# Patient Record
Sex: FEMALE | Race: WHITE | NOT HISPANIC OR LATINO | Employment: OTHER | URBAN - NONMETROPOLITAN AREA
[De-identification: names, ages, dates, MRNs, and addresses within clinical notes are randomized per-mention and may not be internally consistent; named-entity substitution may affect disease eponyms.]

---

## 2018-03-23 ENCOUNTER — OFFICE VISIT CONVERTED (OUTPATIENT)
Dept: FAMILY MEDICINE CLINIC | Age: 78
End: 2018-03-23
Attending: FAMILY MEDICINE

## 2018-08-29 ENCOUNTER — OFFICE VISIT CONVERTED (OUTPATIENT)
Dept: FAMILY MEDICINE CLINIC | Age: 78
End: 2018-08-29
Attending: FAMILY MEDICINE

## 2018-09-04 ENCOUNTER — OFFICE VISIT CONVERTED (OUTPATIENT)
Dept: FAMILY MEDICINE CLINIC | Age: 78
End: 2018-09-04
Attending: FAMILY MEDICINE

## 2018-10-17 ENCOUNTER — OFFICE VISIT CONVERTED (OUTPATIENT)
Dept: NEUROSURGERY | Facility: CLINIC | Age: 78
End: 2018-10-17
Attending: PHYSICIAN ASSISTANT

## 2018-10-17 ENCOUNTER — CONVERSION ENCOUNTER (OUTPATIENT)
Dept: NEUROLOGY | Facility: CLINIC | Age: 78
End: 2018-10-17

## 2018-12-05 ENCOUNTER — OFFICE VISIT CONVERTED (OUTPATIENT)
Dept: FAMILY MEDICINE CLINIC | Age: 78
End: 2018-12-05
Attending: NURSE PRACTITIONER

## 2019-01-25 ENCOUNTER — OFFICE VISIT CONVERTED (OUTPATIENT)
Dept: FAMILY MEDICINE CLINIC | Age: 79
End: 2019-01-25
Attending: FAMILY MEDICINE

## 2019-03-22 ENCOUNTER — HOSPITAL ENCOUNTER (OUTPATIENT)
Dept: OTHER | Facility: HOSPITAL | Age: 79
Discharge: HOME | End: 2019-03-22
Attending: FAMILY MEDICINE

## 2019-03-22 ENCOUNTER — OFFICE VISIT CONVERTED (OUTPATIENT)
Dept: FAMILY MEDICINE CLINIC | Age: 79
End: 2019-03-22
Attending: FAMILY MEDICINE

## 2019-03-22 LAB
APPEARANCE UR: CLEAR
BACTERIA UR QL AUTO: ABNORMAL
BILIRUB UR QL: NEGATIVE
CASTS URNS QL MICRO: ABNORMAL /[LPF]
COLOR UR: ABNORMAL
CONV LEUKOCYTE ESTERASE: NEGATIVE
CONV UROBILINOGEN IN URINE BY AUTOMATED TEST STRIP: 0.2 {EHRLICHU}/DL (ref 0.1–1)
EPI CELLS #/AREA URNS HPF: ABNORMAL /[HPF]
GLUCOSE 24H UR-MCNC: NEGATIVE MG/DL
HGB UR QL STRIP: NEGATIVE
KETONES UR QL STRIP: NEGATIVE MG/DL
MUCOUS THREADS URNS QL MICRO: ABNORMAL
NITRITE UR-MCNC: NEGATIVE MG/ML
PH UR STRIP.AUTO: 6.5 [PH] (ref 5–8)
PROT UR-MCNC: NEGATIVE MG/DL
RBC # BLD AUTO: ABNORMAL /[HPF]
SP GR UR STRIP: 1.02 (ref 1–1.03)
SPECIMEN SOURCE: ABNORMAL
UNIDENT CRYS URNS QL MICRO: ABNORMAL /[HPF]
WBC #/AREA URNS HPF: ABNORMAL /[HPF]

## 2019-09-24 ENCOUNTER — HOSPITAL ENCOUNTER (OUTPATIENT)
Dept: OTHER | Facility: HOSPITAL | Age: 79
Discharge: HOME | End: 2019-09-24
Attending: FAMILY MEDICINE

## 2019-09-24 ENCOUNTER — OFFICE VISIT CONVERTED (OUTPATIENT)
Dept: FAMILY MEDICINE CLINIC | Age: 79
End: 2019-09-24
Attending: FAMILY MEDICINE

## 2019-09-26 ENCOUNTER — HOSPITAL ENCOUNTER (OUTPATIENT)
Dept: PHYSICAL THERAPY | Facility: CLINIC | Age: 79
Setting detail: RECURRING SERIES
Discharge: HOME | End: 2019-11-08
Attending: FAMILY MEDICINE

## 2019-10-29 ENCOUNTER — HOSPITAL ENCOUNTER (OUTPATIENT)
Dept: OTHER | Facility: HOSPITAL | Age: 79
Discharge: HOME | End: 2019-10-29
Attending: FAMILY MEDICINE

## 2019-10-29 ENCOUNTER — OFFICE VISIT CONVERTED (OUTPATIENT)
Dept: FAMILY MEDICINE CLINIC | Age: 79
End: 2019-10-29
Attending: FAMILY MEDICINE

## 2019-10-29 LAB
ALBUMIN SERPL-MCNC: 3.9 G/DL (ref 3.5–5)
ALBUMIN/GLOB SERPL: 1.4 {RATIO} (ref 1.4–2.6)
ALP SERPL-CCNC: 84 U/L (ref 43–160)
ALT SERPL-CCNC: 14 U/L (ref 10–40)
ANION GAP SERPL CALC-SCNC: 18 MMOL/L (ref 8–19)
AST SERPL-CCNC: 18 U/L (ref 15–50)
BILIRUB SERPL-MCNC: 0.28 MG/DL (ref 0.2–1.3)
BUN SERPL-MCNC: 19 MG/DL (ref 5–25)
BUN/CREAT SERPL: 23 {RATIO} (ref 6–20)
CALCIUM SERPL-MCNC: 9.5 MG/DL (ref 8.7–10.4)
CHLORIDE SERPL-SCNC: 101 MMOL/L (ref 99–111)
CHOLEST SERPL-MCNC: 184 MG/DL (ref 107–200)
CHOLEST/HDLC SERPL: 3.5 {RATIO} (ref 3–6)
CONV CO2: 23 MMOL/L (ref 22–32)
CONV TOTAL PROTEIN: 6.7 G/DL (ref 6.3–8.2)
CREAT UR-MCNC: 0.83 MG/DL (ref 0.5–0.9)
ERYTHROCYTE [DISTWIDTH] IN BLOOD BY AUTOMATED COUNT: 13.1 % (ref 11.5–14.5)
GFR SERPLBLD BASED ON 1.73 SQ M-ARVRAT: >60 ML/MIN/{1.73_M2}
GLOBULIN UR ELPH-MCNC: 2.8 G/DL (ref 2–3.5)
GLUCOSE SERPL-MCNC: 89 MG/DL (ref 65–99)
HBA1C MFR BLD: 12.9 G/DL (ref 12–16)
HCT VFR BLD AUTO: 39.7 % (ref 37–47)
HDLC SERPL-MCNC: 53 MG/DL (ref 40–60)
LDLC SERPL CALC-MCNC: 111 MG/DL (ref 70–100)
MCH RBC QN AUTO: 28.8 PG (ref 27–31)
MCHC RBC AUTO-ENTMCNC: 32.5 G/DL (ref 33–37)
MCV RBC AUTO: 88.6 FL (ref 81–99)
OSMOLALITY SERPL CALC.SUM OF ELEC: 286 MOSM/KG (ref 273–304)
PLATELET # BLD AUTO: 332 10*3/UL (ref 130–400)
PMV BLD AUTO: 9.9 FL (ref 7.4–10.4)
POTASSIUM SERPL-SCNC: 4.7 MMOL/L (ref 3.5–5.3)
RBC # BLD AUTO: 4.48 10*6/UL (ref 4.2–5.4)
SODIUM SERPL-SCNC: 137 MMOL/L (ref 135–147)
TRIGL SERPL-MCNC: 102 MG/DL (ref 40–150)
VIT B12 SERPL-MCNC: 1396 PG/ML (ref 211–911)
VLDLC SERPL-MCNC: 20 MG/DL (ref 5–37)
WBC # BLD AUTO: 8.06 10*3/UL (ref 4.8–10.8)

## 2019-12-11 ENCOUNTER — OFFICE VISIT CONVERTED (OUTPATIENT)
Dept: FAMILY MEDICINE CLINIC | Age: 79
End: 2019-12-11
Attending: NURSE PRACTITIONER

## 2020-05-20 ENCOUNTER — OFFICE VISIT CONVERTED (OUTPATIENT)
Dept: FAMILY MEDICINE CLINIC | Age: 80
End: 2020-05-20
Attending: FAMILY MEDICINE

## 2020-06-18 ENCOUNTER — OFFICE VISIT CONVERTED (OUTPATIENT)
Dept: FAMILY MEDICINE CLINIC | Age: 80
End: 2020-06-18
Attending: FAMILY MEDICINE

## 2020-09-21 ENCOUNTER — OFFICE VISIT CONVERTED (OUTPATIENT)
Dept: FAMILY MEDICINE CLINIC | Age: 80
End: 2020-09-21
Attending: FAMILY MEDICINE

## 2020-10-01 ENCOUNTER — HOSPITAL ENCOUNTER (OUTPATIENT)
Dept: OTHER | Facility: HOSPITAL | Age: 80
Discharge: HOME | End: 2020-10-01
Attending: FAMILY MEDICINE

## 2020-10-01 LAB
ERYTHROCYTE [DISTWIDTH] IN BLOOD BY AUTOMATED COUNT: 13.4 % (ref 11.7–14.4)
HCT VFR BLD AUTO: 41.3 % (ref 37–47)
HGB BLD-MCNC: 13.2 G/DL (ref 12–16)
MCH RBC QN AUTO: 29.3 PG (ref 27–31)
MCHC RBC AUTO-ENTMCNC: 32 G/DL (ref 33–37)
MCV RBC AUTO: 91.8 FL (ref 81–99)
PLATELET # BLD AUTO: 380 10*3/UL (ref 130–400)
PMV BLD AUTO: 9.9 FL (ref 9.4–12.3)
RBC # BLD AUTO: 4.5 10*6/UL (ref 4.2–5.4)
WBC # BLD AUTO: 10.04 10*3/UL (ref 4.8–10.8)

## 2020-10-02 LAB
ALBUMIN SERPL-MCNC: 4.2 G/DL (ref 3.5–5)
ALBUMIN/GLOB SERPL: 1.4 {RATIO} (ref 1.4–2.6)
ALP SERPL-CCNC: 74 U/L (ref 43–160)
ALT SERPL-CCNC: 16 U/L (ref 10–40)
ANION GAP SERPL CALC-SCNC: 16 MMOL/L (ref 8–19)
AST SERPL-CCNC: 20 U/L (ref 15–50)
BILIRUB SERPL-MCNC: 0.24 MG/DL (ref 0.2–1.3)
BUN SERPL-MCNC: 12 MG/DL (ref 5–25)
BUN/CREAT SERPL: 12 {RATIO} (ref 6–20)
CALCIUM SERPL-MCNC: 10 MG/DL (ref 8.7–10.4)
CHLORIDE SERPL-SCNC: 100 MMOL/L (ref 99–111)
CONV CO2: 29 MMOL/L (ref 22–32)
CONV TOTAL PROTEIN: 7.2 G/DL (ref 6.3–8.2)
CREAT UR-MCNC: 1.04 MG/DL (ref 0.5–0.9)
GFR SERPLBLD BASED ON 1.73 SQ M-ARVRAT: 50 ML/MIN/{1.73_M2}
GLOBULIN UR ELPH-MCNC: 3 G/DL (ref 2–3.5)
GLUCOSE SERPL-MCNC: 84 MG/DL (ref 65–99)
OSMOLALITY SERPL CALC.SUM OF ELEC: 289 MOSM/KG (ref 273–304)
POTASSIUM SERPL-SCNC: 4.6 MMOL/L (ref 3.5–5.3)
SODIUM SERPL-SCNC: 140 MMOL/L (ref 135–147)
T4 FREE SERPL-MCNC: 1.3 NG/DL (ref 0.9–1.8)
TSH SERPL-ACNC: 2.95 M[IU]/L (ref 0.27–4.2)

## 2021-04-27 ENCOUNTER — OFFICE VISIT CONVERTED (OUTPATIENT)
Dept: FAMILY MEDICINE CLINIC | Age: 81
End: 2021-04-27
Attending: FAMILY MEDICINE

## 2021-05-16 VITALS
DIASTOLIC BLOOD PRESSURE: 78 MMHG | WEIGHT: 199 LBS | SYSTOLIC BLOOD PRESSURE: 140 MMHG | HEIGHT: 62 IN | BODY MASS INDEX: 36.62 KG/M2 | HEART RATE: 88 BPM

## 2021-05-18 NOTE — PROGRESS NOTES
Yolanda Sharp 1940     Office/Outpatient Visit    Visit Date: Wed, Dec 5, 2018 04:12 pm    Provider: Pat Bocanegra N.P. (Assistant: Dirk Piña)    Location: [facility]        Electronically signed by Pat Bocanegra N.P. on  2018 11:19:56 AM                             SUBJECTIVE:        CC:     Ms. Sharp is a 77 year old White female.  poked herself in her left eye 2 days ago, red looking, yellow discharge; (wants flu shot today)         HPI:         2 days ago was putting up shyann tree at home.  artificial tree branch poked her in left eye.  eye has been itchy and watery with occasional yellow discharge.          corner of outer nostril x 2 wks without discharge.      ROS:     CONSTITUTIONAL:  Negative for chills, fatigue, fever, and weight change.      EYES:  Positive for eye drainage ( clear to yellow left eye ).   Negative for blurred vision, eye pain or photophobia.      E/N/T:  Negative for hearing problems, E/N/T pain, congestion, rhinorrhea, epistaxis, hoarseness, and dental problems.      CARDIOVASCULAR:  Negative for chest pain, palpitations, tachycardia, orthopnea, and edema.      RESPIRATORY:  Negative for cough, dyspnea, and hemoptysis.      MUSCULOSKELETAL:  Negative for arthralgias, back pain, and myalgias.      NEUROLOGICAL:  Negative for dizziness, headaches, paresthesias, and weakness.          PMH/FMH/SH:     Last Reviewed on 2018 02:16 PM by Kathleen Brown    Past Medical History:         Hypertension     Gastroesophageal Reflux Disease     Osteoporosis     obesity    chronic back pain,     sleep apnea    hyperhidrosis    Chronic LBP    constipation    obesity    Depression with anxiety         GYNECOLOGICAL HISTORY:     miscarriage 1         CURRENT MEDICAL PROVIDERS:    Ophthalmologist: MORTEZA    Orthopedist: GREEN    pain management Dr Montes         PREVENTIVE HEALTH MAINTENANCE             BONE DENSITY: was last done  with the  following abnormality noted-- osteopenia     COLORECTAL CANCER SCREENING: Up to date (colonoscopy q10y; sigmoidoscopy q5y; Cologuard q3y) was last done , Results are in chart     EYE EXAM: was last done  s/p cataract implants     MAMMOGRAM: Done within last 2 years and results in are chart was last done  with normal results         PAST MEDICAL HISTORY                 ADVANCED DIRECTIVES: None         Surgical History:         Appendectomy    Cholecystectomy    Hysterectomy      ortho procedure left knee; Procedures: left hand surgery 80's cataract surgery both eyes          Family History:     Father: ;  Cerebrovascular Accident     Mother: ;  Sudden Cardiac Death         Social History:     Occupation: Homemaker. OTHER (enter), retired;     Marital Status:      Children: 3 children         Tobacco/Alcohol/Supplements:     Last Reviewed on 2018 02:16 PM by Kathleen Brown    Tobacco: She has a past history of cigarette smoking; quit date:  .          Alcohol:  Does not drink alcohol and never has.          Supplements:  Patient admits to use of multivitamin.          Mental Health History:         Generalized Anxiety Disorder      Major Depression             Current Problems:     Last Reviewed on 2014 03:48 PM by Kathleen Brown    Degenerative disc disease, NOS     Dizziness     Unspecified hearing loss     Use of high risk medications     Chronic low back pain     Fatigue     Eczema     Generalized weakness     Constipation     Lipoma, other specified site     Generalized anxiety disorder     Hip pain     Leukocytosis, unspecified     Generalized osteoarthritis, multiple sites     Obstructive sleep apnea     Excessive sweating     Chronic insomnia     GERD     Bariatric surgery status     Overweight     Dietary surveillance and counseling     Morbid obesity     Osteopenia     Weight loss program, follow-up     Severe obesity     Facial droop      Patient visit for long term (current) use of other drugs     Hypertension     Sleep apnea     Low back pain     Diffuse arthralgia     Hyperhidrosis     Back pain     Breast mass     Night sweats     Joint pain     Depression with anxiety     Screening for depression         Immunizations:     Prevnar 13 (Pneumococcal PCV 13) 8/24/2015     Flulaval 11/28/2007     Fluzone (3 + years dose) 10/10/2008     Fluzone (3 + years dose) 2/22/2012     Fluzone (3 + years dose) 11/14/2012     Fluzone High-Dose pf (>=65 yr) 12/9/2013     Fluzone High-Dose pf (>=65 yr) 10/13/2014     Fluzone High-Dose pf (>=65 yr) 2/7/2017         Allergies:     Last Reviewed on 9/04/2018 01:31 PM by Zofia Queen:        Current Medications:     Last Reviewed on 12/05/2018 04:17 PM by Dirk Piña    Tramadol 50mg Tablet 1-2 po QID     Fluoxetine 20mg Capsules 1 capsule daily     Omeprazole 40mg Capsules, Extended Release 1 capsule daily     Spironolactone 25mg Tablet 1 tab daily     Zofran 4mg Tablet 1 po q 6 hours prn N/V     Magnesium Citrate     Melatonin     Biotin     Calcium     CPAP at hs     OTC allergy pill     Vitamin B12     multivitamin daily         OBJECTIVE:        Vitals:         Current: 12/5/2018 4:18:58 PM    Ht:  5 ft, 2.75 in;  Wt: 201 lbs;  BMI: 35.9    T: 98.1 F (oral);  BP: 142/74 mm Hg (left arm, sitting);  P: 72 bpm (left arm (BP Cuff), sitting);  sCr: 0.82 mg/dL;  GFR: 62.28        Exams:     PHYSICAL EXAM:     GENERAL:  well developed and nourished; appropriately groomed; in no apparent distress;     EYES: lids and lacrimal system are normal in appearance; extraocular movements intact; left medial conjunctival abrasion seen by fluorescein stain;  PERRLA;     E/N/T: EARS:;     RESPIRATORY: normal respiratory rate and pattern with no distress; normal breath sounds with no rales, rhonchi, wheezes or rubs;     CARDIOVASCULAR: normal rate; rhythm is regular;     BREAST/INTEGUMENT: 1-2 mm flate,  red area near outer, right nares  without discharge.;     NEUROLOGIC: mental status: alert and oriented x 3; GROSSLY INTACT     PSYCHIATRIC:  appropriate affect and demeanor; normal speech pattern; grossly normal memory;         Procedures:     Vaccination against other viral diseases, Influenza     1. Influenza high dose 0.5 ml unit dose, ABN signed given IM in the left upper arm; administered by AND;  lot number [id number]; expires 6/4/19 Regarding contraindications to an Influenza vaccine:        No contraindications were noted.              ASSESSMENT           V04.81   Z23  Vaccination against other viral diseases, Influenza              DDx:     918.1   S05.02XA  Corneal abrasion              DDx:     239.2   D49.9  Unspecified skin lesion              DDx:         ORDERS:         Meds Prescribed:       Mupirocin 2% Topical Ointment apply affected area bid  #15 (Fifteen) gm Refills: 0       Tobramycin 0.3% Ophthalmic Solution Instill 2 drop(s) to affected eye(s) q 12 h  #5 (Five) ml Refills: 0         Procedures Ordered:       79319  Fluzone High Dose  (In-House)           Other Orders:         Medicare Flu Administration  (In-House)                   PLAN:          Vaccination against other viral diseases, Influenza           Orders:       65022  Fluzone High Dose  (In-House)           Medicare Flu Administration  (In-House)            Corneal abrasion         RECOMMENDATIONS given include: see eye dr in 2 days if not improving.  no foreign body noted.  does see dr saldivar for regular eye exams..            Prescriptions:       Tobramycin 0.3% Ophthalmic Solution Instill 2 drop(s) to affected eye(s) q 12 h  #5 (Five) ml Refills: 0          Unspecified skin lesion           Prescriptions:       Mupirocin 2% Topical Ointment apply affected area bid  #15 (Fifteen) gm Refills: 0             CHARGE CAPTURE           **Please note: ICD descriptions below are intended for billing purposes only and may not  represent clinical diagnoses**        Primary Diagnosis:         V04.81 Vaccination against other viral diseases, Influenza            Z23    Encounter for immunization              Orders:          80806   Office/outpatient visit; established patient, level 3  (In-House)             89682   Fluzone High Dose  (In-House)                Medicare Flu Administration  (In-House)           918.1 Corneal abrasion            S05.02XA    Injury of conjunctiva and corneal abrasion without foreign body, left eye, initial encounter    239.2 Unspecified skin lesion            D49.9    Neoplasm of unspecified behavior of unspecified site

## 2021-05-18 NOTE — PROGRESS NOTES
"Yolanda Sharp  1940     Office/Outpatient Visit    Visit Date: Wed, May 20, 2020 12:57 pm    Provider: Kathleen Brown MD (Assistant: Judith Jackman LPN)    Location: [facility]        Electronically signed by Kathleen Brown MD on  05/20/2020 03:47:20 PM                             Subjective:        CC: Ms. Sharp is a 79 year old White female.  pt has a [employer] [phone], medication refills; routine f/u to discuss meds        HPI:           Ms. Sharp presents with essential (primary) hypertension.  Her current cardiac medication regimen includes a diuretic, an angiotensin receptor blocker, and spironolactone.  Ms. Sharp does not check her blood pressure other than at her clinic appointments.  She is tolerating the medication well without side effects.  Compliance with treatment has been good; she takes her medication as directed, maintains her diet and exercise regimen, and follows up as directed.        Yolanda complains of a one month history of having fast intermittent heart rate and dizzy spells.  She takes meclizine when this occurs.  NO chest pain, no fever or SOA, she is worried about a stroke, has FH stroke.      her BM are big and hard and she has to \"dig it out of her\" She is on miralax daily.  She is not on stool softeners    ROS:     CONSTITUTIONAL:  Negative for chills and fever.      CARDIOVASCULAR:  Positive for dizziness.   Negative for chest pain, palpitations or edema.      RESPIRATORY:  Negative for dyspnea and cough.      GASTROINTESTINAL:  Positive for constipation.   Negative for abdominal pain or diarrhea.      MUSCULOSKELETAL:  Positive for bilateral knee pain, left hip pain, back pain and neck pain..      INTEGUMENTARY/BREAST:  Negative for rash.      NEUROLOGICAL:  Positive for paresthesias and weakness.      PSYCHIATRIC:  Negative for anxiety, depression, and sleep disturbances.          Past Medical History / Family History / Social History:         Last " Reviewed on 2020 02:43 PM by Kathleen Brown    Past Medical History:         Hypertension     Gastroesophageal Reflux Disease     Osteoporosis     obesity    chronic back pain,     sleep apnea    hyperhidrosis    Chronic LBP    constipation    obesity    Depression with anxiety         GYNECOLOGICAL HISTORY:     miscarriage 1         CURRENT MEDICAL PROVIDERS:    Ophthalmologist: MORTEZA    Orthopedist: GREEN    pain management Dr Montes         PREVENTIVE HEALTH MAINTENANCE             BONE DENSITY: was last done  with the following abnormality noted-- osteopenia     COLORECTAL CANCER SCREENING: Up to date (colonoscopy q10y; sigmoidoscopy q5y; Cologuard q3y) was last done , Results are in chart     EYE EXAM: was last done  s/p cataract implants     MAMMOGRAM: Done within last 2 years and results in are chart was last done  with normal results         PAST MEDICAL HISTORY                 ADVANCED DIRECTIVES: None         Surgical History:         Appendectomy    Cholecystectomy    Hysterectomy     ortho procedure left knee; Procedures: left hand surgery 's cataract surgery both eyes          Family History:     Father: ;  Cerebrovascular Accident     Mother: ;  Sudden Cardiac Death         Social History:     Occupation: Homemaker. OTHER (enter), retired;     Marital Status:      Children: 3 children         Tobacco/Alcohol/Supplements:     Last Reviewed on 2020 12:59 PM by Judith Jackman    Tobacco: She has a past history of cigarette smoking; quit date:  .          Alcohol:  Does not drink alcohol and never has.          Supplements:  Patient admits to use of multivitamin.          Substance Abuse History:     Last Reviewed on 2019 06:26 PM by Roney Holguin        Mental Health History:     Last Reviewed on 2019 06:26 PM by Roney Holguin        Generalized Anxiety Disorder     Major Depression         Communicable Diseases  (eg STDs):     Last Reviewed on 9/24/2019 06:26 PM by Roney Holguin        Allergies:     Last Reviewed on 12/11/2019 02:57 PM by Mi Dela Cruz    Quibron:          Current Medications:     Last Reviewed on 12/11/2019 02:57 PM by Mi Dela Cruz    FLUoxetine 20 mg oral capsule [TAKE ONE CAPSULE BY MOUTH EVERY DAY]    multivitamin daily     ondansetron HCL 4 mg oral tablet [TAKE 1 TABLET BY MOUTH EVERY SIX HOURS AS NEEDED FOR NAUSEA AND VOMITING]    omeprazole 40 mg oral capsule,delayed release (enteric coated) [TAKE 1 CAPSULE BY MOUTH EVERY DAY]    carbidopa-levodopa  mg oral tablet [TAKE 1 TABLET BY MOUTH AT BEDTIME]    OTC allergy pill     CPAP at hs     spironolactone 25 mg oral tablet [TAKE 1 TABLET BY MOUTH EVERY DAY]    Calcium     Melatonin     Magnesium Citrate     meclizine 25 mg oral tablet [TAKE 1 TO 2 TABLETS BY MOUTH TWICE DAILY AS NEEDED FOR DIZZINESS]    diclofenac sodium 75 mg oral tablet, delayed release (enteric coated) [TAKE ONE TABLET BY MOUTH TWICE DAILY WITH FOOD]    amoxicillin 875 mg oral tablet [take 1 tablet (875 mg) by oral route every 12 hours]        Objective:        Vitals:         Current: 5/20/2020 2:41:54 PM    Ht:  5 ft, 2.75 in;  Wt: 201 lbs;  BMI: 35.9BP: 138/79 mm Hg (left arm, sitting);  P: 74 bpm (left arm (BP Cuff), sitting);  R: 16 bpm;  sCr: 0.83 mg/dL;  GFR: 59.59        Exams:     PHYSICAL EXAM:     GENERAL: vital signs recorded - well developed, well nourished;  well groomed;  no apparent distress;     EYES: PERRL, EOMI     RESPIRATORY: normal respiratory rate and pattern with no distress;     NEUROLOGIC: mental status: oriented to person, place, and time;  GROSSLY INTACT     PSYCHIATRIC:  appropriate affect and demeanor; normal speech pattern; grossly normal memory;         Assessment:         I10   Essential (primary) hypertension       K21.9   Gastro-esophageal reflux disease without esophagitis       H93.13   Tinnitus, bilateral       R00.0   Tachycardia,  unspecified       K59.09   Other constipation       F41.1   Generalized anxiety disorder           ORDERS:         Radiology/Test Orders:       3017F  Colorectal CA screen results documented and reviewed (PV)  (In-House)              Other Orders:         Depression screen negative  (In-House)            1101F  Pt screen for fall risk; document no falls in past year or only 1 fall w/o injury in past year (KEE)  (In-House)              Screening mammogram results documented  (Send-Out)                      Plan:         Essential (primary) hypertensionstable and well controlled    MIPS Negative Depression Screen Telehealth: Verbal consent obtained for visit to occur via televideo conferencing; Staff, other than provider, present during telephone visit include only Dr Nguyen was present during the telehealth OV with patient           Orders:         Depression screen negative  (In-House)            1101F  Pt screen for fall risk; document no falls in past year or only 1 fall w/o injury in past year (KEE)  (In-House)              Screening mammogram results documented  (Send-Out)            3017F  Colorectal CA screen results documented and reviewed (PV)  (In-House)              Gastro-esophageal reflux disease without esophagitisstable on omeprazole        RECOMMENDATIONS given include: patient is aware of increased risk of kidney failure, osteoporosis and alzheimers with daily use of this med.          Tinnitus, bilateraldue to chronic hearing disorder, no treatment available        Tachycardia, unspecifiedwill have her CI next week for full eval, she defers eval with cardiologist, she is aware to go to ER if she has symptoms for more than 12 hours or CP.        Other constipationstart her on a stool softener        Generalized anxiety disorderstable on meds,doing well            Charge Capture:         Primary Diagnosis:     I10  Essential (primary) hypertension           Orders:      62149   Office/outpatient visit; established patient, level 4  (In-House)              Depression screen negative  (In-House)            1101F  Pt screen for fall risk; document no falls in past year or only 1 fall w/o injury in past year (KEE)  (In-House)            3017F  Colorectal CA screen results documented and reviewed (PV)  (In-House)              K21.9  Gastro-esophageal reflux disease without esophagitis     H93.13  Tinnitus, bilateral     R00.0  Tachycardia, unspecified     K59.09  Other constipation     F41.1  Generalized anxiety disorder

## 2021-05-18 NOTE — PROGRESS NOTES
Yolanda Sharp 1940     Office/Outpatient Visit    Visit Date:  11:54 am    Provider: Jr Montano MD (Assistant: Dirk Piña)    Location: [facility]        Electronically signed by Jr Montano MD on  2019 01:10:21 PM                             SUBJECTIVE:        CC:     Ms. Sharp is a 78 year old White female.  neck and shoulder pain, a few days ago;         HPI:     Posterior neck pain, left sided, involves her left shoulder and goes up into her head and her ear. Was seen by Dr. Brown in Oct/Nov and was prescribed steroids, an antibiotic (for ear), and PT. She was sent to pain management but canceled this appointment on the advise of PT. She is nervous about getting shots in her back. Neck and shoulder pain returned a couple days ago and Sx are very similar to before. She reports pain is severe. She got an Rx of tramadol from Dr. Brown but reports this hasn't helped.     ROS:     CONSTITUTIONAL:  Negative for fatigue and fever.      EYES:  Negative for blurred vision.      E/N/T:  Positive for ear pain.   Negative for diminished hearing or nasal congestion.      CARDIOVASCULAR:  Negative for chest pain and palpitations.      GASTROINTESTINAL:  Negative for abdominal pain, constipation, diarrhea, nausea and vomiting.      MUSCULOSKELETAL:  Positive for arthralgias, back pain, joint stiffness and myalgias.      NEUROLOGICAL:  Negative for weakness.      PSYCHIATRIC:  Negative for anxiety, depression and sleep disturbance.          PMH/FMH/SH:     Last Reviewed on 2018 02:16 PM by Kathleen Brown    Past Medical History:         Hypertension     Gastroesophageal Reflux Disease     Osteoporosis     obesity    chronic back pain,     sleep apnea    hyperhidrosis    Chronic LBP    constipation    obesity    Depression with anxiety         GYNECOLOGICAL HISTORY:     miscarriage 1         CURRENT MEDICAL PROVIDERS:    Ophthalmologist: MORTEZA     Orthopedist: GREEN    pain management Dr Montes         PREVENTIVE HEALTH MAINTENANCE             BONE DENSITY: was last done  with the following abnormality noted-- osteopenia     COLORECTAL CANCER SCREENING: Up to date (colonoscopy q10y; sigmoidoscopy q5y; Cologuard q3y) was last done , Results are in chart     EYE EXAM: was last done  s/p cataract implants     MAMMOGRAM: Done within last 2 years and results in are chart was last done  with normal results         PAST MEDICAL HISTORY                 ADVANCED DIRECTIVES: None         Surgical History:         Appendectomy    Cholecystectomy    Hysterectomy      ortho procedure left knee; Procedures: left hand surgery 80's cataract surgery both eyes          Family History:     Father: ;  Cerebrovascular Accident     Mother: ;  Sudden Cardiac Death         Social History:     Occupation: Homemaker. OTHER (enter), retired;     Marital Status:      Children: 3 children         Tobacco/Alcohol/Supplements:     Last Reviewed on 2018 04:16 PM by Dirk Piña    Tobacco: She has a past history of cigarette smoking; quit date:  .          Alcohol:  Does not drink alcohol and never has.          Supplements:  Patient admits to use of multivitamin.          Mental Health History:         Generalized Anxiety Disorder      Major Depression             Current Problems:     Last Reviewed on 2014 03:48 PM by Kathleen Brown    Degenerative disc disease, NOS     Dizziness     Unspecified hearing loss     Use of high risk medications     Chronic low back pain     Fatigue     Eczema     Generalized weakness     Constipation     Lipoma, other specified site     Generalized anxiety disorder     Hip pain     Leukocytosis, unspecified     Generalized osteoarthritis, multiple sites     Obstructive sleep apnea     Excessive sweating     Chronic insomnia     GERD     Bariatric surgery status     Overweight      Dietary surveillance and counseling     Morbid obesity     Osteopenia     Weight loss program, follow-up     Severe obesity     Facial droop     Patient visit for long term (current) use of other drugs     Hypertension     Sleep apnea     Low back pain     Diffuse arthralgia     Hyperhidrosis     Back pain     Breast mass     Night sweats     Joint pain     Depression with anxiety     Corneal abrasion     Unspecified skin lesion     Screening for depression         Immunizations:     Prevnar 13 (Pneumococcal PCV 13) 8/24/2015     Flulaval 11/28/2007     Fluzone (3 + years dose) 10/10/2008     Fluzone (3 + years dose) 2/22/2012     Fluzone (3 + years dose) 11/14/2012     Fluzone High-Dose pf (>=65 yr) 12/9/2013     Fluzone High-Dose pf (>=65 yr) 10/13/2014     Fluzone High-Dose pf (>=65 yr) 2/7/2017     Fluzone High-Dose pf (>=65 yr) 12/5/2018         Allergies:     Last Reviewed on 12/05/2018 04:16 PM by Dirk Piña    Quibron:        Current Medications:     Last Reviewed on 12/05/2018 04:17 PM by Dirk Piña    Omeprazole 40mg Capsules, Extended Release 1 capsule daily     Sinemet 25mg/100mg Tablet Take 1 tablet at bedtime     Spironolactone 25mg Tablet 1 tab daily     Fluoxetine 20mg Capsules 1 capsule daily     Tramadol 50mg Tablet 1-2 po QID     Zofran 4mg Tablet 1 po q 6 hours prn N/V     Magnesium Citrate     Melatonin     Biotin     Calcium     CPAP at hs     OTC allergy pill     Vitamin B12     multivitamin daily         OBJECTIVE:        Vitals:         Current: 1/25/2019 12:03:17 PM    Ht:  5 ft, 2.75 in;  Wt: 199.2 lbs;  BMI: 35.6    T: 97.7 F (oral);  BP: 142/77 mm Hg (right arm, sitting);  P: 80 bpm (right arm (BP Cuff), sitting);  sCr: 0.82 mg/dL;  GFR: 61.06        Exams:     PHYSICAL EXAM:     GENERAL: vital signs recorded - well developed, well nourished,  moderately obese;  no apparent distress;     EYES: extraocular movements intact; conjunctiva and cornea are normal; PERRLA;     E/N/T:  EARS: external auditory canal normal bilaterally;  left TM is normal and right TM is normal;  OROPHARYNX:  normal mucosa, dentition, gingiva, and posterior pharynx;     NECK: range of motion is decreased with side flexion (to the left and right) and rotation in either direction;  thyroid is non-palpable; neck tender to palpation along L paracervical muscles;     RESPIRATORY: normal respiratory rate and pattern with no distress; normal breath sounds with no rales, rhonchi, wheezes or rubs;     CARDIOVASCULAR: normal rate; rhythm is regular;  no systolic murmur; no edema;     GASTROINTESTINAL: nontender, nondistended; no hepatosplenomegaly or masses; no bruits;     MUSCULOSKELETAL: gait: slowed, stooped, and uses a cane;  good UE  strength bilaterally     NEUROLOGIC: mental status: oriented to person, place, and time;  cranial nerves II-XII grossly intact; Spurling positive bilaterally;     PSYCHIATRIC: appropriate affect and demeanor; normal psychomotor function;         ASSESSMENT           723.1   S16.1XXD  Neck pain              DDx:         ORDERS:         Meds Prescribed:       Refill of: Medrol (Methylprednisolone) 4mg Dosepak Take as directed with food  #1 (One) dose pack Refills: 0         Procedures Ordered:       REFER  Referral to Specialist or Other Facility  (Send-Out)                   PLAN:          Neck pain Pt previously referred to [facility] by Dr. Brown but she canceled this on advice from PT (per patient). Given findings on CT Cervical spine and physical exam (Spurling positive) I will re-refer her to neurosurgery, first available appointment.         REFERRALS:  Referral initiated to a neurosurgeon.            Prescriptions:       Refill of: Medrol (Methylprednisolone) 4mg Dosepak Take as directed with food  #1 (One) dose pack Refills: 0           Orders:       REFER  Referral to Specialist or Other Facility  (Send-Out)               CHARGE CAPTURE           **Please note: ICD descriptions  below are intended for billing purposes only and may not represent clinical diagnoses**        Primary Diagnosis:         723.1 Neck pain            S16.1XXD    Strain of muscle, fascia and tendon at neck level, subsequent encounter              Orders:          [id number]   Office/outpatient visit; established patient, level 4  (In-House)

## 2021-05-18 NOTE — PROGRESS NOTES
Yolanda Sharp 1940     Office/Outpatient Visit    Visit Date: Fri, Mar 23, 2018 03:26 pm    Provider: Kathleen Brown MD (Assistant: Elizabeth De Souza MA)    Location: [facility]        Electronically signed by Kathleen Brown MD on  03/26/2018 05:07:07 PM                             SUBJECTIVE:        CC: ventral hernia         HPI:     ventral hernia-she thinks it is new-in her R mid L abd, and she has h/o mesh for another hernia repair, and this new hernia is bothering her.  She is also having abd pain for weeks which is moderate in severity.  She is also having increased GERD post gastric sleeve with Dr Blackburn.     she was on gabepentin for RLS and it helped her sleep at night, she is wanting to go back on this, I explained to her it is now a controlled substance and causes wt gain and I would like to try her on something else     chronic anxiety and depression, she is overall stable on fluoxetine at 20 mg daily, she is not sleeping well due to RLS.  She denies suicidal ideation.  PHQ-9 reviewed today:3 for difficulty sleeping and feeling tired due to this.       Yolanda is in for her 3 month face to face for chronic LBP due to DDD, neck pain, and chronic knee pain.  She is coping well and rarely takes her pain pill tramadol, but she does need this med prn severe pain when her low back acts up.    MRI in Nov 2014 showed multiple severely degenerating discs L3-5. She has no signs of diversion or abuse.     ROS:     CONSTITUTIONAL:  Negative for chills and fever.      E/N/T:  Negative for ear pain, frequent rhinorrhea and sore throat.      CARDIOVASCULAR:  Negative for chest pain, palpitations, tachycardia, orthopnea, and edema.      RESPIRATORY:  Negative for cough, dyspnea, and hemoptysis.      GASTROINTESTINAL:  Positive for constipation and nausea.   Negative for abdominal pain, diarrhea, melena or vomiting.      NEUROLOGICAL:  Positive for paresthesias and weakness.   Negative for dizziness or  headaches.      PSYCHIATRIC:  Negative for anxiety and depression.          PMH/FMH/SH:     Last Reviewed on 3/23/2018 04:07 PM by Kathleen Brown    Past Medical History:         Hypertension     Gastroesophageal Reflux Disease     Osteoporosis     obesity    chronic back pain,     sleep apnea    hyperhidrosis    Chronic LBP    constipation    obesity    Depression with anxiety         GYNECOLOGICAL HISTORY:     miscarriage 1         CURRENT MEDICAL PROVIDERS:    Ophthalmologist: MORTEZA    Orthopedist: GREEN    pain management Dr Montes         PREVENTIVE HEALTH MAINTENANCE             Prevnar 13: was last done          Surgical History:         Appendectomy    Cholecystectomy    Hysterectomy      ortho procedure left knee; Procedures: left hand surgery 's cataract surgery both eyes          Family History:     Father: ;  Cerebrovascular Accident     Mother: ;  Sudden Cardiac Death         Social History:     Occupation: Homemaker. OTHER (enter), retired;     Marital Status:      Children: 3 children         Tobacco/Alcohol/Supplements:     Last Reviewed on 3/23/2018 04:07 PM by Kathleen Brown    Tobacco: She has a past history of cigarette smoking; quit date:  .          Alcohol:  Does not drink alcohol and never has.          Supplements:  Patient admits to use of multivitamin.          Mental Health History:         Generalized Anxiety Disorder      Major Depression             Allergies:     Last Reviewed on 3/23/2018 03:31 PM by Elizabeth De Souza    Quibron:        Current Medications:     Last Reviewed on 3/23/2018 03:31 PM by Elizabeth De Souza    Spironolactone 25mg Tablet 1 tab daily     Omeprazole 40mg Capsules, Extended Release 1 capsule daily     Gabapentin 300mg Capsules 1 capsule po tid     Tramadol 50mg Tablet 1-2 po QID     Zofran 4mg Tablet 1 po q 6 hours prn N/V     Biotin     Calcium     CPAP at hs     OTC allergy pill     Vitamin B12      multivitamin daily     Fluoxetine 20mg Capsules 1 capsule daily         OBJECTIVE:        Vitals:         Current: 3/23/2018 3:29:10 PM    Ht:  5 ft, 2.75 in;  Wt: 198.1 lbs;  BMI: 35.4    T: 97.6 F (oral);  BP: 139/87 mm Hg (left arm, sitting);  P: 77 bpm (left arm (BP Cuff), sitting);  sCr: 0.79 mg/dL;  GFR: 64.24        Exams:     PHYSICAL EXAM:     GENERAL: vital signs recorded - well developed, well nourished,  moderately obese;  no apparent distress;     EYES: extraocular movements intact; conjunctiva and cornea are normal; PERRLA;     E/N/T: EARS: external auditory canal occluded by cerumen bilaterally;  OROPHARYNX:  normal mucosa, dentition, gingiva, and posterior pharynx;     NECK: range of motion is normal; thyroid is non-palpable;     RESPIRATORY: normal respiratory rate and pattern with no distress; normal breath sounds with no rales, rhonchi, wheezes or rubs;     CARDIOVASCULAR: normal rate; rhythm is regular;  no systolic murmur; no edema;     GASTROINTESTINAL: nontender, nondistended; no hepatosplenomegaly or masses; no bruits;     MUSCULOSKELETAL: gait: slowed, stooped, and uses a cane;     NEUROLOGIC: mental status: oriented to person, place, and time;  sensation: normal to touch and pinprick; vibration and proprioception senses intact; Reflexes: knee jerks: 2+;  neg straight leg raise;     PSYCHIATRIC: appropriate affect and demeanor; normal psychomotor function;         Lab/Test Results:             Urine temperature:  confirmed (03/23/2018),     All urine drug screen levels confirmed negative:  yes (03/23/2018),     Date and time of last pill:  Tramadol 3/23/18@12pm (03/23/2018),     Performed by:  tls (03/23/2018),     Collection Time:  1630 (03/23/2018),             Procedures:     External cerumen impaction         Cerumen/Wax removal: Cerumen impaction is noted in both ears The degree of wax accumulation is moderate in the left ear and right ear.  With moderate dificulty, using a syringe  irrigation, the wax is removed.  Removed from ear was hard balls of wax.  The patient tolerated the procedure reasonably well.      There were no complications.  Performed by: atc             ASSESSMENT           553.29   K43.9  Ventral hernia, other              DDx:     789.00   E13.39  Abdominal pain, unspecified              DDx:     333.99   G25.81  Restless leg syndrome              DDx:     296.21   F33.0  mild depression              DDx:     724.2   M54.5  Chronic low back pain              DDx:     V58.69   Z79.899  Use of high risk medications              DDx:     380.4   H61.23  External cerumen impaction              DDx:         ORDERS:         Meds Prescribed:       Refill of: Spironolactone 25mg Tablet 1 tab daily  #30 (Thirty) tablet(s) Refills: 0       Refill of: Omeprazole 40mg Capsules, Extended Release 1 capsule daily  #30 (Thirty) capsule(s) Refills: 0       Refill of: Fluoxetine 20mg Capsules 1 capsule daily  #90 (Ninety) capsule(s) Refills: 1       Requip (Ropinirole HCl) 0.25mg Tablet Take 1 -2 tablet(s) q hs  #90 (Ninety) tablet(s) Refills: 1       Refill of: Tramadol 50mg Tablet 1-2 po QID  #60 (Sixty) tablet(s) Refills: 0         Radiology/Test Orders:       [id number]  Colonoscopy, flexible; diagnostic  (Send-Out)           Lab Orders:       [id number]  Drug test prsmv read direct optical obs pr date  (In-House)           Procedures Ordered:       [id number]  Upper gastrointestinal endoscopy including esophagus, stomach, and either the duodenum and/or jejunu  (Send-Out)         [id number]  Removal of impacted cerumen right ear (NURSE)  (In-House)           Other Orders:       [id number]  Removal of impacted cerumen left ear (NURSE)  (In-House)                   PLAN:          Ventral hernia, other referal to Dr Blackburn for eval for ventral hernia repair          Abdominal pain, unspecified will check labs and refer to Dr Blackburn for EGD and colonoscopy and eval for ventral hernia         TESTS/PROCEDURES:   Will proceed with a colonoscopy and EGD to be performed/scheduled now.            Orders:       [id number]  Colonoscopy, flexible; diagnostic  (Send-Out)         [id number]  Upper gastrointestinal endoscopy including esophagus, stomach, and either the duodenum and/or jejunu  (Send-Out)            Restless leg syndrome will try her on requip          mild depression stable, will treat her insomnia due to RLS and refill her current dose of prozac           Prescriptions:       Refill of: Spironolactone 25mg Tablet 1 tab daily  #30 (Thirty) tablet(s) Refills: 0       Refill of: Omeprazole 40mg Capsules, Extended Release 1 capsule daily  #30 (Thirty) capsule(s) Refills: 0       Refill of: Fluoxetine 20mg Capsules 1 capsule daily  #90 (Ninety) capsule(s) Refills: 1       Requip (Ropinirole HCl) 0.25mg Tablet Take 1 -2 tablet(s) q hs  #90 (Ninety) tablet(s) Refills: 1          Chronic low back pain tramadol refilled #60 to last 3-6 months           Prescriptions:       Refill of: Tramadol 50mg Tablet 1-2 po QID  #60 (Sixty) tablet(s) Refills: 0          Use of high risk medications         RECOMMENDATIONS given include: [place] reviewed, drug screen performed and appropriate, consent is reviewed and signed and on the chart, she is aware of risk of addiction on this medication and understands that she will need to follow up for a review every 3 months and her medications will be adjusted or decreased as deemed appropriate at each visit.  No personal history of drug or alcohol abuse.  No concerns about diversion or abuse.  She denies side effects related to the medication.  She is  aware that she may be called in for pill counts..            Orders:       [id number]  Drug test prsmv read direct optical obs pr date  (In-House)            External cerumen impaction           Orders:       [id number]  Removal of impacted cerumen left ear (NURSE)  (In-House)         [id number]  Removal of impacted cerumen right ear (NURSE)  (In-House)                CHARGE CAPTURE           **Please note: ICD descriptions below are intended for billing purposes only and may not represent clinical diagnoses**        Primary Diagnosis:         553.29 Ventral hernia, other            K43.9    Ventral hernia without obstruction or gangrene              Orders:          [id number]   Office/outpatient visit; established patient, level 4  (In-House)           789.00 Abdominal pain, unspecified            E13.39    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    333.99 Restless leg syndrome            G25.81    Restless legs syndrome    296.21 mild depression            F33.0    Major depressive disorder, recurrent, mild    724.2 Chronic low back pain            M54.5    Low back pain    V58.69 Use of high risk medications            Z79.899    Other long term (current) drug therapy              Orders:          [id number]   Drug test prsmv read direct optical obs pr date  (In-House)           380.4 External cerumen impaction            H61.23    Impacted cerumen, bilateral              Orders:          [id number]   Removal of impacted cerumen left ear (NURSE)  (In-House)             [id number]   Removal of impacted cerumen right ear (NURSE)  (In-House)

## 2021-05-18 NOTE — PROGRESS NOTES
Yolanda Sharp 1940     Office/Outpatient Visit    Visit Date: Tue, Oct 29, 2019 01:33 pm    Provider: Kathleen Brown MD (Assistant: Mi Dela Cruz MA)    Location: [facility]        Electronically signed by Kathleen Brown MD on  11/06/2019 04:17:47 PM                             SUBJECTIVE:        CC: BP follow up         HPI:         Patient to be evaluated for hypertension.  Her current cardiac medication regimen includes a diuretic, an angiotensin receptor blocker, and spironolactone.  Ms. Sharp does not check her blood pressure other than at her clinic appointments.  She is tolerating the medication well without side effects.  Compliance with treatment has been good; she takes her medication as directed, maintains her diet and exercise regimen, and follows up as directed.      Yolanda is in with worsening LBP with shooting pain across her bra line when she bends over, and she is worried about cancer.  She also suffers from chronic OA pain in her knees B.  She is on tramadol but rarely. History includes S/P L THR 9/2016 and s/p gastric sleeve 7/2014.  She has tried and failed PT in past.   Her pain is radicular pain to knees B and she also has neck pain, UE weakness or paresthesias UE.  MRI in Nov 2014 showed multiple severely degenerating discs L3-5. No signs of abuse or diversion.     ROS:     CONSTITUTIONAL:  Negative for chills and fever.      CARDIOVASCULAR:  Positive for dizziness.   Negative for chest pain, palpitations or edema.      RESPIRATORY:  Negative for dyspnea and cough.      MUSCULOSKELETAL:  Positive for bilateral knee pain, left hip pain, back pain and neck pain..      NEUROLOGICAL:  Positive for paresthesias and weakness.      PSYCHIATRIC:  Positive for depression.          PMH/FMH/SH:     Last Reviewed on 10/29/2019 01:52 PM by Kathleen Brown    Past Medical History:         Hypertension     Gastroesophageal Reflux Disease     Osteoporosis     obesity    chronic back  pain,     sleep apnea    hyperhidrosis    Chronic LBP    constipation    obesity    Depression with anxiety         GYNECOLOGICAL HISTORY:     miscarriage 1         CURRENT MEDICAL PROVIDERS:    Ophthalmologist: MORTEZA    Orthopedist: GREEN    pain management Dr Montes         PREVENTIVE HEALTH MAINTENANCE             BONE DENSITY: was last done  with the following abnormality noted-- osteopenia     COLORECTAL CANCER SCREENING: Up to date (colonoscopy q10y; sigmoidoscopy q5y; Cologuard q3y) was last done , Results are in chart     EYE EXAM: was last done  s/p cataract implants     MAMMOGRAM: Done within last 2 years and results in are chart was last done  with normal results         PAST MEDICAL HISTORY                 ADVANCED DIRECTIVES: None         Surgical History:         Appendectomy    Cholecystectomy    Hysterectomy      ortho procedure left knee; Procedures: left hand surgery 80's cataract surgery both eyes          Family History:     Father: ;  Cerebrovascular Accident     Mother: ;  Sudden Cardiac Death         Social History:     Occupation: Homemaker. OTHER (enter), retired;     Marital Status:      Children: 3 children         Tobacco/Alcohol/Supplements:     Last Reviewed on 10/29/2019 01:52 PM by Kathleen Brown    Tobacco: She has a past history of cigarette smoking; quit date:  .          Alcohol:  Does not drink alcohol and never has.          Supplements:  Patient admits to use of multivitamin.          Substance Abuse History:     Last Reviewed on 2019 06:26 PM by Roney Holguin        Mental Health History:     Last Reviewed on 2019 06:26 PM by Roney Holguin        Generalized Anxiety Disorder      Major Depression         Communicable Diseases (eg STDs):     Last Reviewed on 2019 06:26 PM by Roney Holguin            Allergies:     Last Reviewed on 2019 06:26 PM by Roney Holguin    Quibron:         Current Medications:     Last Reviewed on 9/24/2019 06:26 PM by Roney Holguin    Omeprazole 40mg Capsules, Extended Release 1 capsule daily     Sinemet 25mg/100mg Tablet Take 1 tablet at bedtime     Spironolactone 25mg Tablet 1 tab daily     Zofran 4mg Tablet 1 po q 6 hours prn N/V     Fluoxetine 20mg Capsules 1 capsule daily     Diclofenac Sodium 75mg Tablets, Enteric Coated 1 tab bid with food     Magnesium Citrate     Melatonin     Calcium     CPAP at hs     OTC allergy pill     multivitamin daily         OBJECTIVE:        Vitals:         Current: 10/29/2019 1:41:50 PM    Ht:  5 ft, 2.75 in;  Wt: 209.2 lbs;  BMI: 37.4    T: 98.8 F (oral);  BP: 100/73 mm Hg (right arm, sitting);  P: 66 bpm (right arm (BP Cuff), sitting);  sCr: 0.82 mg/dL;  GFR: 62.35        Exams:     PHYSICAL EXAM:     GENERAL: vital signs recorded - moderately obese, wheel-chair bound;  no apparent distress;     EYES: conjunctiva and cornea are normal;     E/N/T: OROPHARYNX:  normal mucosa, dentition, gingiva, and posterior pharynx;     NECK:  supple, full ROM; no thyromegaly; no carotid bruits;     RESPIRATORY: CTA B WITHOUT WHEEZING/RALES OR RHONCHI, NORMAL RESP. EFFORT     CARDIOVASCULAR: normal rate; rhythm is regular;  no edema; No murmurs. clicks, gallops or rubs appreciated;     GASTROINTESTINAL: nontender, nondistended; no hepatosplenomegaly or masses; no bruits;     NEUROLOGIC: mental status: alert and oriented x 3; Grossly intact;     PSYCHIATRIC: appropriate affect and demeanor; normal speech pattern; Normal behavior;         Lab/Test Results:             Hemoglobin:  14.60 (gm/dl) (05/01/2017),     Hematocrit:  43.9 (%) (05/01/2017),     BUN:  17 (mg/dl) (05/04/2018),     Glom Filt Rate, Est:  >60 (ml/min/1.73m2) (05/04/2018),             Procedures:     Vaccination against other viral diseases, Influenza     1. Influenza high dose 0.5 ml unit dose, ABN signed given IM in the left upper arm; administered by ael;  lot number [id number];  expires 5-26-20 Regarding contraindications to an Influenza vaccine: Denies moderate/severe illness with/without fever; serious reaction to eggs, egg proteins, gentamicin, gelatin, arginine, neomycin or polymixin; serious reaction after recieving previous influenza vaccines; and history of Guillain-Kensett Syndrome.              ASSESSMENT           V04.81   Z23  Vaccination against other viral diseases, Influenza              DDx:     401.1   I10  Hypertension              DDx:     530.81   K21.9  GERD              DDx:     300.02   F41.1  Generalized anxiety disorder              DDx:     V45.86   Z98.84  Bariatric surgery status              DDx:     724.2   M54.5  Lower back pain              DDx:     715.09   M15.0  Generalized osteoarthritis, multiple sites              DDx:     564.09   K59.09  Other constipation              DDx:         ORDERS:         Meds Prescribed:       Refill of: Zofran (Ondansetron HCl) 4mg Tablet 1 po q 6 hours prn N/V  #20 (Twenty) tablet(s) Refills: 0       Lactulose 10gm/15ml Oral Solution 15 ml once a day  #473 (Four [place] and Seventy Three) ml Refills: 0       Meclizine HCl 25mg Tablet Take 1 to 2 tabs twice daily as needed for dizziness.  #40 (Forty) tablet(s) Refills: 1       Tizanidine HCl 2mg Tablet 1 tab PO BID prn spasm  #40 (Forty) tablet(s) Refills: 1         Lab Orders:       67102  VB12 - [facility] Vitamin B12  (Send-Out)         56998  BDCB2 - [facility] CBC w/o diff  (Send-Out)         69661  COMP - [facility] Comp. Metabolic Panel  (Send-Out)         16627  LPDP - [facility] Lipid Panel  (Send-Out)           Procedures Ordered:       55456  Fluzone High Dose  (In-House)           Other Orders:         Administration of influenza virus vaccine (x1)                 PLAN:          Vaccination against other viral diseases, Influenza           Orders:       [id number]  Fluzone High Dose  (In-House)                     Administration of influenza virus vaccine (x1)          Hypertension well  controlled     LABORATORY:  Labs ordered to be performed today include B12, CBC W/O DIFF, Comprehensive metabolic panel, and lipid panel.            Orders:       09841  VB12 - [facility] Vitamin B12  (Send-Out)         22234  BDCB2 - [facility] CBC w/o diff  (Send-Out)         67461  COMP - [facility] Comp. Metabolic Panel  (Send-Out)         41632  LPDP - [facility] Lipid Panel  (Send-Out)            GERD stable on omeprazole         RECOMMENDATIONS given include: patient is aware of increased risk of kidney failure, osteoporosis and alzheimers with daily use of this med.           Generalized anxiety disorder stable and well controlled          Bariatric surgery status stable          Lower back pain will add tizandine for prn muscle spasm          Generalized osteoarthritis, multiple sites stable on diclofenac         RECOMMENDATIONS given include: try NSAID, patient  is told to  watch for upset stomach, PUD/GI bleeding and aware of increased risk of CAD.           Other constipation           Prescriptions:       Refill of: Zofran (Ondansetron HCl) 4mg Tablet 1 po q 6 hours prn N/V  #20 (Twenty) tablet(s) Refills: 0       Lactulose 10gm/15ml Oral Solution 15 ml once a day  #473 (Four [place] and Seventy Three) ml Refills: 0       Meclizine HCl 25mg Tablet Take 1 to 2 tabs twice daily as needed for dizziness.  #40 (Forty) tablet(s) Refills: 1       Tizanidine HCl 2mg Tablet 1 tab PO BID prn spasm  #40 (Forty) tablet(s) Refills: 1             CHARGE CAPTURE           **Please note: ICD descriptions below are intended for billing purposes only and may not represent clinical diagnoses**        Primary Diagnosis:         V04.81 Vaccination against other viral diseases, Influenza            Z23    Encounter for immunization              Orders:          04627   Office/outpatient visit; established patient, level 4  (In-House)             42462   Fluzone High Dose  (In-House)                                           Administration of  influenza virus vaccine (x1)         401.1 Hypertension            I10    Essential (primary) hypertension    530.81 GERD            K21.9    Gastro-esophageal reflux disease without esophagitis    300.02 Generalized anxiety disorder            F41.1    Generalized anxiety disorder    V45.86 Bariatric surgery status            Z98.84    Bariatric surgery status    724.2 Lower back pain            M54.5    Low back pain           M54.5    Low back pain    715.09 Generalized osteoarthritis, multiple sites            M15.0    Primary generalized (osteo)arthritis    564.09 Other constipation            K59.09    Other constipation

## 2021-05-18 NOTE — PROGRESS NOTES
Yolanda Sharp 1940     Office/Outpatient Visit    Visit Date: Tue, Sep 24, 2019 02:35 pm    Provider: Roney Holguin MD (Assistant: Mi Dela Cruz MA)    Location: [facility]        Electronically signed by Roney Holguin MD on  09/24/2019 06:28:31 PM                             SUBJECTIVE:        CC: dizzy, back pain  when she bends over, power wheelchair  PT STATES SHE IANT TAKING TRAMADOL         HPI:         PHQ-9 Depression Screening: Completed form scanned and in chart; Total Score 5     Patient is here today for evaluation for a power wheelchair.  She notes that she has had a power wheelchair for better part of the last 10 years but that her equipment is now starting to fail and she needs replacement.  She was told that she would need to have a physical examination and also be referred to physical therapy prior to being granted approval for new equipment.  Patient has a long-standing history of generalized weakness.  She finds her self unable to ambulate more than a few steps at a time before becoming profoundly weak.  This is exacerbated by the fact that she also becomes severely dizzy upon standing. Her weakness and generalized deconditioning also makes it exceedingly difficult for her to operate a standard wheelchair. She notes the prior to obtaining her power wheelchair, she had several falls.  Furthermore, the patient reports that she has widespread musculoskeletal pain.  She has bilateral degenerative arthritis in both knees.  She has a history of a left hip replacement.  She also has degenerative disc disease in her cervical, thoracic and lumbar spine. All these factors are further complicated by the fact that the patient has a BMI of 37.     Patient reports that she continues to have widespread pain.  However, she notes that she has stopped taking tramadol and is not interested in any further controlled substance prescriptions.  She is currently using OTC Tylenol as needed.      "Patient was diagnosed with osteopenia on 2017 when a DEXA scan revealed a T score of -2.2. FRAX score revealed a 10-year major osteoporotic fracture risk of 22% and a hip fracture risk of 5.8%.  Patient notes that she takes supplemental calcium with vitamin D.  She is unsure between her multivitamin calcium plus D, international units of vitamin D she is getting per day.     She reports that she has been having midthoracic back pain that has been increased in intensity from baseline.  She notes that she has back pain chronically as noted above that over the last several weeks to a month when she bends forward at the waist her thoracic spine feels \"like it is going to break.\".  She has no history of compression fractures despite her history of osteopenia.  She has had no injury to the area.  No recent falls. No numbness or tingling in any extremities.     Patient reports that her mood has been stable.  She denies increased anxiety symptoms or feeling \"on edge.\"  She denies SI or HI. She has issues with sleep but says that this is chronic.  Her current regimen Prozac 20 mg daily with which she reports compliance without adverse effects.     ROS:     CONSTITUTIONAL:  Negative for chills and fever.      CARDIOVASCULAR:  Positive for dizziness.   Negative for chest pain, palpitations or edema.      RESPIRATORY:  Negative for dyspnea and cough.      MUSCULOSKELETAL:  Positive for bilateral knee pain, left hip pain, back pain and neck pain..      NEUROLOGICAL:  Positive for paresthesias and weakness.      PSYCHIATRIC:  Positive for depression.          PMH/FMH/SH:     Last Reviewed on 2019 06:26 PM by Roney Holguin    Past Medical History:         Hypertension     Gastroesophageal Reflux Disease     Osteoporosis     obesity    chronic back pain,     sleep apnea    hyperhidrosis    Chronic LBP    constipation    obesity    Depression with anxiety         GYNECOLOGICAL HISTORY:     miscarriage 1         CURRENT " MEDICAL PROVIDERS:    Ophthalmologist: MORTEZA    Orthopedist: GREEN    pain management Dr Montes         PREVENTIVE HEALTH MAINTENANCE             BONE DENSITY: was last done  with the following abnormality noted-- osteopenia     COLORECTAL CANCER SCREENING: Up to date (colonoscopy q10y; sigmoidoscopy q5y; Cologuard q3y) was last done , Results are in chart     EYE EXAM: was last done  s/p cataract implants     MAMMOGRAM: Done within last 2 years and results in are chart was last done  with normal results         PAST MEDICAL HISTORY                 ADVANCED DIRECTIVES: None         Surgical History:         Appendectomy    Cholecystectomy    Hysterectomy      ortho procedure left knee; Procedures: left hand surgery 's cataract surgery both eyes          Family History:     Father: ;  Cerebrovascular Accident     Mother: ;  Sudden Cardiac Death         Social History:     Occupation: Homemaker. OTHER (enter), retired;     Marital Status:      Children: 3 children         Tobacco/Alcohol/Supplements:     Last Reviewed on 2019 06:26 PM by Roney Holguin    Tobacco: She has a past history of cigarette smoking; quit date:  .          Alcohol:  Does not drink alcohol and never has.          Supplements:  Patient admits to use of multivitamin.          Substance Abuse History:     Last Reviewed on 2019 06:26 PM by Roney Holguin        Mental Health History:     Last Reviewed on 2019 06:26 PM by Roney Holguin        Generalized Anxiety Disorder      Major Depression         Communicable Diseases (eg STDs):     Last Reviewed on 2019 06:26 PM by Roney Holguin            Current Problems:     Last Reviewed on 2019 06:26 PM by Roney Holguin    Mid back pain     Osteopenia     Lower back pain     Degenerative disc disease, NOS     Dizziness     Unspecified hearing loss     Use of high risk medications     Chronic low back pain     Fatigue      Eczema     Generalized weakness     Constipation     Lipoma, other specified site     Generalized anxiety disorder     Hip pain     Leukocytosis, unspecified     Generalized osteoarthritis, multiple sites     Obstructive sleep apnea     Excessive sweating     Chronic insomnia     GERD     Bariatric surgery status     Overweight     Dietary surveillance and counseling     Morbid obesity     Weight loss program, follow-up     Severe obesity     Facial droop     Patient visit for long term (current) use of other drugs     Hypertension     Sleep apnea     Low back pain     Diffuse arthralgia     Hyperhidrosis     Back pain     Breast mass     Night sweats     Joint pain     Depression with anxiety     Screening for depression         Immunizations:     Prevnar 13 (Pneumococcal PCV 13) 8/24/2015     Flulaval 11/28/2007     Fluzone (3 + years dose) 10/10/2008     Fluzone (3 + years dose) 2/22/2012     Fluzone (3 + years dose) 11/14/2012     Fluzone High-Dose pf (>=65 yr) 12/9/2013     Fluzone High-Dose pf (>=65 yr) 10/13/2014     Fluzone High-Dose pf (>=65 yr) 2/7/2017     Fluzone High-Dose pf (>=65 yr) 12/5/2018         Allergies:     Last Reviewed on 9/24/2019 06:26 PM by Roney Holguinibron:        Current Medications:     Last Reviewed on 9/24/2019 06:26 PM by Roney Holguin    Fluoxetine 20mg Capsules 1 capsule daily     Omeprazole 40mg Capsules, Extended Release 1 capsule daily     Spironolactone 25mg Tablet 1 tab daily     Zofran 4mg Tablet 1 po q 6 hours prn N/V     Sinemet 25mg/100mg Tablet Take 1 tablet at bedtime     Diclofenac Sodium 75mg Tablets, Enteric Coated 1 tab bid with food     Magnesium Citrate     Melatonin     Calcium     CPAP at hs     OTC allergy pill     multivitamin daily         OBJECTIVE:        Vitals:         Current: 9/24/2019 2:59:10 PM    Ht:  5 ft, 2.75 in;  Wt: 209.2 lbs;  BMI: 37.4    T: 98.8 F (oral);  BP: 138/58 mm Hg (right arm, sitting);  P: 69 bpm (right arm (BP Cuff),  "sitting);  sCr: 0.82 mg/dL;  GFR: 62.35        Exams:     PHYSICAL EXAM:     GENERAL: vital signs recorded - moderately obese, wheel-chair bound;  no apparent distress;     EYES: conjunctiva and cornea are normal;     RESPIRATORY: Clear to auscultation bilaterally; no rales (\"crackles\") present; no rhonchi; no wheezes;     CARDIOVASCULAR: normal rate; rhythm is regular;  No murmurs. clicks, gallops or rubs appreciated; no edema;     BREAST/INTEGUMENT: No significant rashes, lesions or suspicious moles within limits of examination;     MUSCULOSKELETAL: muscle strength: 4/5 in all major muscle groups.;  Unable to ammbulate;     NEUROLOGIC: Grossly intact; mental status: alert and oriented x 3;     PSYCHIATRIC: appropriate affect and demeanor; normal speech pattern; Normal behavior;         ASSESSMENT           780.79   R53.1  Generalized weakness - Chronic              DDx:     715.09   M15.0  Generalized osteoarthritis, multiple sites - Chronic; progressing              DDx:     278.01   E66.01  Severe obesity - BMI 37              DDx:     733.90   M85.9  Osteopenia -T score -2.2; FRAX scores revealed 10-year risks of major osteoporotic fracture and hip fracture at 22 and 5.8% respectively              DDx: Degenerative changes versus compression fracture     724.5   M54.6  Mid back pain - Chronic but acutely worsened              DDx:     V79.0   Z13.89  Screening for depression - PHQ9 9 score of 11 consistent with moderate depression              DDx:     300.4   F43.23  Depression with anxiety - Stable; PHQ9 9 score of 11 consistent with moderate depression              DDx:         ORDERS:         Radiology/Test Orders:       [id number]  Radiologic examination, spine; thoracic, two views  (Send-Out)           Procedures Ordered:       REFER  Referral to Specialist or Other Facility  (Send-Out)           Other Orders:         Calculated BMI above the upper parameter and a follow-up plan was documented in the " medical record  (In-House)           Depression screen positive and follow up plan documented  (In-House)                   PLAN:          Generalized weakness -Patient is unable to safely use a cane or a walker.  Due to immobility related to chronic pain, chronic generalized weakness and chronic diffuse degenerative changes of joints, a power wheelchair as needed to assist with daily living activities inside the home.  The patient does not possess the necessary upper body strength or stamina to propel a standard wheelchair inside home or in public. Referral placed to physical therapy         REFERRALS:  Referral initiated to physical therapy ( [facility] ).            Orders:       REFER  Referral to Specialist or Other Facility  (Send-Out)            Generalized osteoarthritis, multiple sites -See plan for generalized weakness above.          Severe obesity         RECOMMENDATIONS given include: BMI Elevated - Follow-Up Plan: She was provided education on weight loss strategies..            Orders:         Calculated BMI above the upper parameter and a follow-up plan was documented in the medical record  (In-House)            Osteopenia - Continue vitamin D and calcium supplementation.  Patient advised that she should be getting 800 to 1000 international units of vitamin D per day. Would recommend repeat DEXA in 1 to 2 years.          Mid back pain -Thoracic plain film ordered today to evaluate for compression fracture.  Patient advised that she can use Tylenol 1000 mg up to 3 times daily as needed for pain control. Referral placed to physical therapy as above.           Orders:       [id number]  Radiologic examination, spine; thoracic, two views  (Send-Out)            Screening for depression     MIPS PHQ-9 Depression Screening: Completed form scanned and in chart; Total Score 11 Positive Depression Screen: Stable on medications. No suicidal ideation.            Orders:          Depression screen positive and follow up plan documented  (In-House)            Depression with anxiety - Continue Prozac 20 mg daily             CHARGE CAPTURE           **Please note: ICD descriptions below are intended for billing purposes only and may not represent clinical diagnoses**        Primary Diagnosis:         780.79 Generalized weakness            R53.1    Weakness              Orders:          83626   Office/outpatient visit; established patient, level 4  (In-House)           715.09 Generalized osteoarthritis, multiple sites            M15.0    Primary generalized (osteo)arthritis    278.01 Severe obesity            E66.01    Morbid (severe) obesity due to excess calories              Orders:             Calculated BMI above the upper parameter and a follow-up plan was documented in the medical record  (In-House)           733.90 Osteopenia            M85.9    Disorder of bone density and structure, unspecified    724.5 Mid back pain            M54.6    Pain in thoracic spine    V79.0 Screening for depression            Z13.89    Encounter for screening for other disorder              Orders:             Depression screen positive and follow up plan documented  (In-House)           300.4 Depression with anxiety            F43.23    Adjustment disorder with mixed anxiety and depressed mood        ADDENDUMS:      ____________________________________    Addendum: 10/15/2019 03:43 PM - Three, Team         Visit Note Faxed to:        User Entered Recipient; Number [phone]

## 2021-05-18 NOTE — PROGRESS NOTES
Yolanda Sharp  1940     Office/Outpatient Visit    Visit Date: Tue, Apr 27, 2021 03:07 pm    Provider: Kathleen Brown MD (Assistant: Yun Russo MA)    Location: [facility]        Electronically signed by Kathleen Brown MD on  04/27/2021 05:42:48 PM                             Subjective:        CC: MWE    HPI:           Ms. Sharp is here for a Medicare wellness visit.  The required HRA questions are integrated within this visit note. Family medical history and individual medical/surgical history were reviewed and updated.  A current height, weight, BMI, blood pressure, and pulse were recorded in the vitals section of the note and have been reviewed. Patient's medications, including supplements, were recorded in the chart and reviewed.  Current providers and suppliers were reviewed and updated.          Self-Assessment of Health: She rates her health as very good. She rates her confidence of being able to control/manage most of her health problems as very confident. Her physical/emotional health has limited her social activites not at all.  A review of cognitive impairment was performed, including ability to drive a car, manage finances, and any memory changes, and was found to be negative.  A review of functional ability, including bathing, dressing, walking, and urine/bowel continence as well as level of safety was performed and was found to be negative.  Falls Risk: Has not had any falls or only one fall without injury in the past year.  In regard to hearing, she reports having trouble hearing the TV/radio when others do not and having to strain to hear or understand conversations, but not wearing hearing aid(s).  Concerning home safety, she reports that at home she DOES have adequate lighting, a skid resistant shower/tub, grab bars in the bath and functioning smoke alarms, but not absence of throw rugs.          Immunization Status: Age>60, no shingles vaccination; pt  unsure of last tetanus; Physical Activity: She never excercises.; Type of diet patient normally eats is described as well-balanced with fruits and vegetables Tobacco: Past history of cigarette smoking, but has quit.  Preventative Health updated today           PHQ-9 Depression Screening: Completed form scanned and in chart; Total Score 8           Concerning essential (primary) hypertension, her current cardiac medication regimen includes a diuretic, an angiotensin receptor blocker, and spironolactone.  Ms. Sharp does not check her blood pressure other than at her clinic appointments.  She is tolerating the medication well without side effects.  Compliance with treatment has been good; she takes her medication as directed, maintains her diet and exercise regimen, and follows up as directed.      ROS:     CONSTITUTIONAL:  Negative for chills and fever.      CARDIOVASCULAR:  Negative for chest pain, palpitations and edema.      RESPIRATORY:  Negative for dyspnea and cough.      GASTROINTESTINAL:  Positive for constipation.   Negative for abdominal pain or diarrhea.      MUSCULOSKELETAL:  Positive for bilateral knee pain, left hip pain, back pain and neck pain..      INTEGUMENTARY/BREAST:  Negative for rash.      NEUROLOGICAL:  Positive for paresthesias and weakness.      PSYCHIATRIC:  Negative for anxiety, depression, and sleep disturbances.          Past Medical History / Family History / Social History:         Last Reviewed on 2021 03:42 PM by Kathleen Brown    Past Medical History:         Hypertension     Gastroesophageal Reflux Disease     Osteoporosis     obesity    chronic back pain,     sleep apnea    hyperhidrosis    Chronic LBP    constipation    obesity    Depression with anxiety         GYNECOLOGICAL HISTORY:     miscarriage 1         CURRENT MEDICAL PROVIDERS:    Ophthalmologist: MORTEZA    Orthopedist: GREEN    pain management Dr Montes         PREVENTIVE HEALTH MAINTENANCE              BONE DENSITY: was last done  with the following abnormality noted-- osteopenia     COLORECTAL CANCER SCREENING: Up to date (colonoscopy q10y; sigmoidoscopy q5y; Cologuard q3y) was last done , Results are in chart     EYE EXAM: was last done  s/p cataract implants     MAMMOGRAM: Done within last 2 years and results in are chart was last done  with normal results         PAST MEDICAL HISTORY                 ADVANCED DIRECTIVES: None         Surgical History:         Appendectomy    Cholecystectomy    Hysterectomy     ortho procedure left knee; Procedures: left hand surgery 's cataract surgery both eyes          Family History:     Father: ;  Cerebrovascular Accident     Mother: ;  Sudden Cardiac Death         Social History:     Occupation: Homemaker. OTHER (enter), retired;     Marital Status:      Children: 3 children         Tobacco/Alcohol/Supplements:     Last Reviewed on 2020 04:07 PM by Dirk Piña    Tobacco: She has a past history of cigarette smoking; quit date:  .          Alcohol:  Does not drink alcohol and never has.          Supplements:  Patient admits to use of multivitamin.          Substance Abuse History:     Last Reviewed on 2019 06:26 PM by Roney Holguin        Mental Health History:     Last Reviewed on 2019 06:26 PM by Roney Holguin        Generalized Anxiety Disorder     Major Depression         Communicable Diseases (eg STDs):     Last Reviewed on 2019 06:26 PM by Roney Holguin        Immunizations:     influenza, high-dose, quadrivalent (FLUZONE HIGH-DOSE QUAD -) 2020    Prevnar 13 (Pneumococcal PCV 13) 2015    Flulaval 2007    Fluzone (3 + years dose) 10/10/2008    Fluzone (3 + years dose) 2012    Fluzone (3 + years dose) 2012    Fluzone High-Dose pf (>=65 yr) 2013    Fluzone High-Dose pf (>=65 yr) 10/13/2014    Fluzone High-Dose pf (>=65 yr) 2017    Fluzone  High-Dose pf (>=65 yr) 12/5/2018    Fluzone High-Dose pf (>=65 yr) 10/29/2019    COVID-19, mRNA, LNP-S, PF, 30 mcg/0.3 mL dose 3/3/2021    COVID-19, mRNA, LNP-S, PF, 30 mcg/0.3 mL dose 2/10/2021        Allergies:     Last Reviewed on 9/21/2020 04:07 PM by Dirk Piña:          Current Medications:     Last Reviewed on 4/27/2021 03:16 PM by Roslyn Garcia    FLUoxetine 20 mg oral capsule [TAKE ONE CAPSULE BY MOUTH TWICE DAILY]    multivitamin daily     ondansetron HCL 4 mg oral tablet [TAKE ONE TABLET BY MOUTH EVERY 6 HOURS AS NEEDED FOR NAUSEA AND VOMITING]    carbidopa-levodopa  mg oral tablet [TAKE ONE TABLET BY MOUTH EVERY NIGHT AT BEDTIME]    OTC allergy pill     CPAP at hs     spironolactone 25 mg oral tablet [TAKE ONE TABLET BY MOUTH EVERY DAY]    Calcium     Melatonin     meclizine 25 mg oral tablet [TAKE ONE TABLET BY MOUTH TWICE DAILY]    diclofenac sodium 75 mg oral tablet, delayed release (enteric coated) [TAKE ONE TABLET BY MOUTH TWICE DAILY WITH FOOD]    Colace 100 mg oral capsule [take 1 capsule (100 mg) by oral route 2 times per day]    famotidine 20 mg oral tablet [TAKE ONE TABLET BY MOUTH TWICE DAILY]        Objective:        Vitals:         Current: 4/27/2021 3:22:27 PM    Ht:  5 ft, 2.75 in;  Wt: 211.8 lbs;  BMI: 37.8T: 97.3 F (temporal);  BP: 127/68 mm Hg (left arm, sitting);  P: 73 bpm (left arm (BP Cuff), sitting);  sCr: 1.04 mg/dL;  GFR: 47.84VA: 20/20 OD, 20/20 OS (near, without correction)        Exams:     PHYSICAL EXAM:     GENERAL: vital signs recorded - well developed, well nourished,  moderately obese;  no apparent distress;     EYES: extraocular movements intact; conjunctiva and cornea are normal; PERRLA;     E/N/T: EARS:  normal external auditory canals and tympanic membranes;  grossly normal hearing; NOSE:  normal nasal mucosa, septum, turbinates, and sinuses; OROPHARYNX:  normal mucosa, dentition, gingiva, and posterior pharynx;     NECK: range of motion is  normal; thyroid is non-palpable;     RESPIRATORY: normal respiratory rate and pattern with no distress; CTA B WITHOUT WHEEZING/RALES OR RHONCHI, NORMAL RESP. EFFORT     CARDIOVASCULAR: normal rate; rhythm is regular;  no systolic murmur; no edema;     GASTROINTESTINAL: nontender, nondistended; no hepatosplenomegaly or masses; no bruits;     BREAST/INTEGUMENT: BREAST-DEFERS;     MUSCULOSKELETAL: gait: uses a scooter;     NEUROLOGIC: mental status: alert and oriented x 3; GROSSLY INTACT     PSYCHIATRIC: appropriate affect and demeanor; normal psychomotor function;         Lab/Test Results:         B12: 1396 (pg/mL) (10/29/2019),     Creatinine, Serum: 1.04 (mg/dl) (10/01/2020),     Glom Filt Rate, Est: 50 (ml/min/1.73m2) (10/01/2020),     Alkaline Phosphatase, Serum: 74 (U/L) (10/01/2020),     ALT (SGPT): 16 (U/L) (10/01/2020),     AST (SGOT): 20 (U/L) (10/01/2020),     TSH: 2.950 (mIU/L) (10/01/2020),     Total Cholesterol: 184 (mg/dL) (10/29/2019),     HDL: 53 (mg/dL) (10/29/2019),     Triglycerides: 102 (mg/dL) (10/29/2019),     LDL: 111 (mg/dL) (10/29/2019),             Assessment:         Z00.00   Encounter for general adult medical examination without abnormal findings       Z13.31   Encounter for screening for depression       I10   Essential (primary) hypertension       E66.01   Morbid (severe) obesity due to excess calories       M15.0   Primary generalized (osteo)arthritis       F41.1   Generalized anxiety disorder       K59.00   Constipation, unspecified       M85.9   Disorder of bone density and structure, unspecified       H81.03   Meniere's disease, bilateral       H61.23   Impacted cerumen, bilateral       K21.9   Gastro-esophageal reflux disease without esophagitis       N18.2   Chronic kidney disease, stage 2 (mild)       H90.3   Sensorineural hearing loss, bilateral       R53.83   Other fatigue           ORDERS:         Meds Prescribed:       [Refilled] famotidine 40 mg oral tablet [take 1 tablet (40  mg) by oral route 2 times per day], #180 (one hundred and eighty) tablets, Refills: 1 (one)       [Refilled] diclofenac sodium 75 mg oral tablet, delayed release (enteric coated) [TAKE ONE TABLET BY MOUTH DAILY], #90 (ninety) tablets, Refills: 2 (two)       [Refilled] FLUoxetine 20 mg oral capsule [TAKE ONE CAPSULE BY MOUTH TWICE DAILY], #60 (sixty) capsules, Refills: 2 (two)       [Refilled] spironolactone 25 mg oral tablet [TAKE ONE TABLET BY MOUTH EVERY DAY], #90 (ninety) tablets, Refills: 2 (two)         Radiology/Test Orders:       71937  DXA, bone density study, 1 or more sites; axial skeleton (eg hips, pelvis, spine)  (Send-Out)            79765  Screening test of audiologic function, pure tone; air only  (Send-Out)    Pt left the office before hearing test was done./pr          Lab Orders:       [id number]  FFAT - [facility] CBC, CMP, TSH, B12 levels FATIGUE PANEL  (Send-Out)            84584  A1CEG - [facility] Hemoglobin A1C  (Send-Out)              Procedures Ordered:         Annual wellness visit, includes a PPPS, subsequent visit  (In-House)              Other Orders:       1101F  Pt screen for fall risk; document no falls in past year or only 1 fall w/o injury in past year (KEE)  (In-House)              Depression screen negative  (In-House)                      Plan:         Encounter for general adult medical examination without abnormal findingsMEDICARE WELLNESS EXAM-SHE IS DUE FOR DEXA, DONE WITH  MAMMOGRAM/ COLONOSCOPY, DONE WITH PAP AND PELVIC, UTD ON PNEUMOVAX/PREVNAR/COVID AND FLU VACCINES, DUE FOR  FLU VACCINE IN OCT, DEFERS SHINGLES/TD, SHE IS A FALL RISK DUE TO LBP AND OA-SHE USES A POWER WHEELCHAIR AND WALKER, NO MEMORY ISSUES, DEPRESSION/ANXIETY ARE STABLE ON PROZAC, SHE LIVES ALONE WITH GOOD FAMILY SUPPORT FROM HER DAUGHTERS, SHE IS ABLE TO DRIVE AND PERFORM ADL'S INDEPENDENTLY, NO URINARY INCONTINENCE, ABLE TO DO HER OWN FINANCES,  SHE WAS GIVEN A HA ON SAFETY AND A LIVING WILL AND A PREV  SERVICES HANDOUT WAS GIVEN TO HER. NO TOBACCO USE, HEARING TO BE SCREENED. MD LIST UPDATED.    ADVANCED DIRECTIVES: None               Orders:         Annual wellness visit, includes a PPPS, subsequent visit  (In-House)              Encounter for screening for depression    MIPS PHQ-9 Depression Screening: Completed form scanned and in chart; Total Score 8; Positive Depression Screen but after further evaluation the patient does not have a diagnosis of depression.            Orders:       1101F  Pt screen for fall risk; document no falls in past year or only 1 fall w/o injury in past year (KEE)  (In-House)              Depression screen negative  (In-House)              Essential (primary) hypertensionstable on meds        RECOMMENDATIONS given include: avoidance of caffeine, avoidance of cigarette smoke, keep blood pressure log as directed, healthy carb, high healthy protein and high fiber diet, avoid salt in diet, f/u every 6 months for BP checks and labs, and exercise 30 min 3-4 days a week.          Morbid (severe) obesity due to excess caloriescont to work on low carb diet        Primary generalized (osteo)arthritisWILL restart diclofenac at lower dose of 75 mg daily        Generalized anxiety disorderSTABLE ON PROZAC        Constipation, unspecifiedon OTC colace and doing ok        Disorder of bone density and structure, unspecified          Orders:       78028  DXA, bone density study, 1 or more sites; axial skeleton (eg hips, pelvis, spine)  (Send-Out)              Meniere's disease, bilateralimproved, on meclizine prn        Impacted cerumen, bilateralMILD, recommend OTC debrox        Gastro-esophageal reflux disease without esophagitiswill increase pepcid to 40 mg bid          Prescriptions:       [Refilled] famotidine 40 mg oral tablet [take 1 tablet (40 mg) by oral route 2 times per day], #180 (one hundred and eighty) tablets, Refills: 1 (one)       [Refilled] diclofenac sodium 75 mg oral  tablet, delayed release (enteric coated) [TAKE ONE TABLET BY MOUTH DAILY], #90 (ninety) tablets, Refills: 2 (two)       [Refilled] FLUoxetine 20 mg oral capsule [TAKE ONE CAPSULE BY MOUTH TWICE DAILY], #60 (sixty) capsules, Refills: 2 (two)       [Refilled] spironolactone 25 mg oral tablet [TAKE ONE TABLET BY MOUTH EVERY DAY], #90 (ninety) tablets, Refills: 2 (two)         Chronic kidney disease, stage 2 (mild)off omeprazole and diclofenac and she is miserable off both meds with her back and joint pain and worsening GERD        Sensorineural hearing loss, bilateral          Orders:       41899  Screening test of audiologic function, pure tone; air only  (Send-Out)    Pt left the office before hearing test was done./pr          Other fatigue    LABORATORY:  Labs ordered to be performed today include Female Fatigue Panel (CBC, CMP, TSH, B12) and HgbA1C.            Orders:       83682  FFAT - [facility] CBC, CMP, TSH, B12 levels FATIGUE PANEL  (Send-Out)            01566  A1CEG - [facility] Hemoglobin A1C  (Send-Out)                  Patient Recommendations:        For  Essential (primary) hypertension:    Try to avoid or reduce the amount of caffeine intake. Avoid cigarette smoke. Keep a daily blood pressure log and report elevated blood pressure to provider as directed. Drink plenty of fluids.  Fever increases the loss of fluids and can lead to dehydration.              Charge Capture:         Primary Diagnosis:     Z00.00  Encounter for general adult medical examination without abnormal findings           Orders:      69940  Preventive medicine, established patient, age 65+ years  (In-House)              Annual wellness visit, includes a PPPS, subsequent visit  (In-House)              Z13.31  Encounter for screening for depression           Orders:      26531-28  Office/outpatient visit; established patient, level 4  (In-House)            1101F  Pt screen for fall risk; document no falls in past year or only 1 fall w/o  injury in past year (KEE)  (In-House)              Depression screen negative  (In-House)              I10  Essential (primary) hypertension     E66.01  Morbid (severe) obesity due to excess calories     M15.0  Primary generalized (osteo)arthritis     F41.1  Generalized anxiety disorder     K59.00  Constipation, unspecified     M85.9  Disorder of bone density and structure, unspecified     H81.03  Meniere's disease, bilateral     H61.23  Impacted cerumen, bilateral     K21.9  Gastro-esophageal reflux disease without esophagitis     N18.2  Chronic kidney disease, stage 2 (mild)     H90.3  Sensorineural hearing loss, bilateral     R53.83  Other fatigue

## 2021-05-18 NOTE — PROGRESS NOTES
Yolanda Sharp  1940     Office/Outpatient Visit    Visit Date: Thu, Jun 18, 2020 02:17 pm    Provider: Kathleen Brown MD (Assistant: Giovanna Lockwood MA)    Location: [facility]        Electronically signed by Kathleen Brown MD on  06/25/2020 03:01:18 PM                             Subjective:        CC: dizzy spell    HPI:         Yolanda complains of a one month history of having fast intermittent heart rate and dizzy spells that has resolved but she is in today for further workup.  She takes meclizine when this occurs.  NO chest pain, no fever or SOA, she is worried about a stroke, has FH stroke.                Concerning essential (primary) hypertension, her current cardiac medication regimen includes a diuretic, an angiotensin receptor blocker, and spironolactone.  Ms. Sharp does not check her blood pressure other than at her clinic appointments.  She is tolerating the medication well without side effects.  Compliance with treatment has been good; she takes her medication as directed, maintains her diet and exercise regimen, and follows up as directed.        she has 3 very itchy bug bites that occured a few days ago       BM continue to be big and hard, she is on miralax daily.  She is not on stool softeners    ROS:     CONSTITUTIONAL:  Negative for chills and fever.      CARDIOVASCULAR:  Positive for dizziness.   Negative for chest pain, palpitations or edema.      RESPIRATORY:  Negative for dyspnea and cough.      GASTROINTESTINAL:  Positive for constipation.   Negative for abdominal pain or diarrhea.      MUSCULOSKELETAL:  Positive for bilateral knee pain, left hip pain, back pain and neck pain..      INTEGUMENTARY/BREAST:  Negative for rash.      NEUROLOGICAL:  Positive for paresthesias and weakness.      PSYCHIATRIC:  Negative for anxiety, depression, and sleep disturbances.          Past Medical History / Family History / Social History:         Last Reviewed on 6/18/2020  02:39 PM by Kathleen Brown    Past Medical History:         Hypertension     Gastroesophageal Reflux Disease     Osteoporosis     obesity    chronic back pain,     sleep apnea    hyperhidrosis    Chronic LBP    constipation    obesity    Depression with anxiety         GYNECOLOGICAL HISTORY:     miscarriage 1         CURRENT MEDICAL PROVIDERS:    Ophthalmologist: MORTEZA    Orthopedist: GREEN    pain management Dr Montes         PREVENTIVE HEALTH MAINTENANCE             BONE DENSITY: was last done  with the following abnormality noted-- osteopenia     COLORECTAL CANCER SCREENING: Up to date (colonoscopy q10y; sigmoidoscopy q5y; Cologuard q3y) was last done , Results are in chart     EYE EXAM: was last done  s/p cataract implants     MAMMOGRAM: Done within last 2 years and results in are chart was last done  with normal results         PAST MEDICAL HISTORY                 ADVANCED DIRECTIVES: None         Surgical History:         Appendectomy    Cholecystectomy    Hysterectomy     ortho procedure left knee; Procedures: left hand surgery 80's cataract surgery both eyes          Family History:     Father: ;  Cerebrovascular Accident     Mother: ;  Sudden Cardiac Death         Social History:     Occupation: Homemaker. OTHER (enter), retired;     Marital Status:      Children: 3 children         Tobacco/Alcohol/Supplements:     Last Reviewed on 2020 02:21 PM by Giovanna Lockwood    Tobacco: She has a past history of cigarette smoking; quit date:  .          Alcohol:  Does not drink alcohol and never has.          Supplements:  Patient admits to use of multivitamin.          Substance Abuse History:     Last Reviewed on 2019 06:26 PM by Roney Holguin        Mental Health History:     Last Reviewed on 2019 06:26 PM by Roney Holguin        Generalized Anxiety Disorder     Major Depression         Communicable Diseases (eg STDs):     Last  Reviewed on 9/24/2019 06:26 PM by Roney Holguin        Allergies:     Last Reviewed on 6/18/2020 02:21 PM by Giovanna Lockwood    Quibron:          Current Medications:     Last Reviewed on 6/18/2020 02:21 PM by Giovanna Lockwood    FLUoxetine 20 mg oral capsule [TAKE ONE CAPSULE BY MOUTH EVERY DAY]    multivitamin daily     ondansetron HCL 4 mg oral tablet [1 BY MOUTH EVERY 6 HOURS AS NEEDED FOR NAUSEA AND VOMITING]    omeprazole 40 mg oral capsule,delayed release (enteric coated) [TAKE 1 CAPSULE BY MOUTH EVERY DAY]    carbidopa-levodopa  mg oral tablet [TAKE 1 TABLET BY MOUTH AT BEDTIME]    OTC allergy pill     CPAP at hs     spironolactone 25 mg oral tablet [TAKE 1 TABLET BY MOUTH EVERY DAY]    Calcium     Melatonin     Magnesium Citrate     meclizine 25 mg oral tablet [TAKE 1 TO 2 TABLETS BY MOUTH TWICE DAILY AS NEEDED FOR DIZZINESS]    diclofenac sodium 75 mg oral tablet, delayed release (enteric coated) [TAKE ONE TABLET BY MOUTH TWICE DAILY WITH FOOD]        Objective:        Vitals:         Current: 6/18/2020 2:22:26 PM    Ht:  5 ft, 2.75 in;  Wt: 210.4 lbs;  BMI: 37.6T: 98.5 F (oral);  BP: 122/70 mm Hg (left arm, sitting);  P: 73 bpm (left arm (BP Cuff), sitting);  sCr: 0.83 mg/dL;  GFR: 60.76        Exams:     PHYSICAL EXAM:     GENERAL: vital signs recorded - well developed, well nourished;  well groomed;  no apparent distress;     EYES: PERRL, EOMI     E/N/T: EARS:  normal external auditory canals and tympanic membranes;  grossly normal hearing; OROPHARYNX:  normal mucosa, dentition, gingiva, and posterior pharynx;     NECK: supple, no LAD;     RESPIRATORY: normal respiratory rate and pattern with no distress;     CARDIOVASCULAR: normal rate; rhythm is regular;  a systolic murmur is noted: it is grade 3/6 and Character soft;     GASTROINTESTINAL: nontender, nondistended; no hepatosplenomegaly or masses; no bruits;     BREAST/INTEGUMENT: erythematous bug bites on L shoulder R breast and R groin;      NEUROLOGIC: mental status: oriented to person, place, and time;  GROSSLY INTACT     PSYCHIATRIC:  appropriate affect and demeanor; normal speech pattern; grossly normal memory;         Lab/Test Results:         LABORATORY RESULTS: EKG performed by ag         Assessment:         R00.0   Tachycardia, unspecified       R42   Dizziness and giddiness       I10   Essential (primary) hypertension       W57.XXXA   Bitten or stung by nonvenomous insect and other nonvenomous arthropods, initial encounter       K59.00   Constipation, unspecified           ORDERS:         Meds Prescribed:       [Recorded] triamcinolone acetonide 0.05 % Topical Ointment [apply a thin layer to the affected area(s) by topical route 2 times per day]       [Refilled] triamcinolone acetonide 0.025 % Topical Cream [apply a thin layer to the affected area(s) by topical route 2 times per day], #15 (fifteen) grams, Refills: 0 (zero)       [Recorded] Colace 100 mg oral capsule [take 1 capsule (100 mg) by oral route 2 times per day]       [Refilled] Colace 100 mg oral capsule [take 1 capsule (100 mg) by oral route 2 times per day], #60 (sixty) capsules, Refills: 5 (five)         Radiology/Test Orders:       94180  Electrocardiogram, routine with at least 12 leads; with interpretation and report  (In-House)            57662  Duplex scan of extracranial arteries; complete bilateral study  (Send-Out)            64086  Echocardiography, transthoracic, real-time w image (2D), w M-mode, w spectral & color flow Doppler  (Send-Out)                      Plan:         Tachycardia, unspecified        TESTS/PROCEDURES:  Will proceed with an ECG to be performed/scheduled now.            Orders:       [id number]  Electrocardiogram, routine with at least 12 leads; with interpretation and report  (In-House)              Dizziness and giddinesswill echo heart and US carotids        TESTS/PROCEDURES:  Will proceed with Carotid Ultrasound and ECHO to be performed/scheduled now.             Orders:       [id number]  Duplex scan of extracranial arteries; complete bilateral study  (Send-Out)            [id number]  Echocardiography, transthoracic, real-time w image (2D), w M-mode, w spectral & color flow Doppler  (Send-Out)              Essential (primary) hypertensionstable and well controlled        Bitten or stung by nonvenomous insect and other nonvenomous arthropods, initial encounterok for topical steroid cream          Prescriptions:       [Recorded] triamcinolone acetonide 0.05 % Topical Ointment [apply a thin layer to the affected area(s) by topical route 2 times per day]       [Refilled] triamcinolone acetonide 0.025 % Topical Cream [apply a thin layer to the affected area(s) by topical route 2 times per day], #15 (fifteen) grams, Refills: 0 (zero)       [Recorded] Colace 100 mg oral capsule [take 1 capsule (100 mg) by oral route 2 times per day]       [Refilled] Colace 100 mg oral capsule [take 1 capsule (100 mg) by oral route 2 times per day], #60 (sixty) capsules, Refills: 5 (five)         Constipation, unspecifiedrecommend stool softeners            Charge Capture:         Primary Diagnosis:     R00.0  Tachycardia, unspecified           Orders:      [id number]  Office/outpatient visit; established patient, level 4  (In-House)            65590  Electrocardiogram, routine with at least 12 leads; with interpretation and report  (In-House)              R42  Dizziness and giddiness     I10  Essential (primary) hypertension     W57.XXXA  Bitten or stung by nonvenomous insect and other nonvenomous arthropods, initial encounter     K59.00  Constipation, unspecified

## 2021-05-18 NOTE — PROGRESS NOTES
Yolanda Sharp  1940     Office/Outpatient Visit    Visit Date: Wed, Dec 11, 2019 02:57 pm    Provider: Polina Rogel N.P. (Assistant: Mi Dela Cruz MA)    Location: [facility]        Electronically signed by Polina Rogel N.P. on  2019 08:43:41 AM                             Subjective:        CC: Ms. Sharp is a 78 year old White female.  chills, sweats, fever, pain in left ear, body aches, not eating, light headed;         HPI:           Patient to be evaluated for acute upper respiratory infection, unspecified.  These have been present for the past 3 weeks.  The symptoms include body aches, Chills, dizziness, ear complaints, subjective fever and poor appetite.  She has already tried to relieve the symptoms with Has taken Meclizine as previously prescribed for dizziness..      ROS:     CONSTITUTIONAL:  Positive for chills and fever ( subjective ).      EYES:  Negative for eye drainage and eye pain.      E/N/T:  Positive for ear pain.   Negative for sore throat.      CARDIOVASCULAR:  Negative for chest pain and palpitations.      RESPIRATORY:  Negative for dyspnea and frequent wheezing.      GASTROINTESTINAL:  Positive for poor appetite.   Negative for abdominal pain or vomiting.      NEUROLOGICAL:  Negative for fainting.          Past Medical History / Family History / Social History:         Last Reviewed on 10/29/2019 01:52 PM by Kathleen Brown    Past Medical History:         Hypertension     Gastroesophageal Reflux Disease     Osteoporosis     obesity    chronic back pain,     sleep apnea    hyperhidrosis    Chronic LBP    constipation    obesity    Depression with anxiety         GYNECOLOGICAL HISTORY:     miscarriage 1         CURRENT MEDICAL PROVIDERS:    Ophthalmologist: MORTEZA    Orthopedist: GREEN    pain management Dr Montes         PREVENTIVE HEALTH MAINTENANCE             BONE DENSITY: was last done  with the following abnormality noted-- osteopenia      COLORECTAL CANCER SCREENING: Up to date (colonoscopy q10y; sigmoidoscopy q5y; Cologuard q3y) was last done , Results are in chart     EYE EXAM: was last done  s/p cataract implants     MAMMOGRAM: Done within last 2 years and results in are chart was last done  with normal results         PAST MEDICAL HISTORY                 ADVANCED DIRECTIVES: None         Surgical History:         Appendectomy    Cholecystectomy    Hysterectomy     ortho procedure left knee; Procedures: left hand surgery 's cataract surgery both eyes          Family History:     Father: ;  Cerebrovascular Accident     Mother: ;  Sudden Cardiac Death         Social History:     Occupation: Homemaker. OTHER (enter), retired;     Marital Status:      Children: 3 children         Tobacco/Alcohol/Supplements:     Last Reviewed on 10/29/2019 01:52 PM by Kathleen Brown    Tobacco: She has a past history of cigarette smoking; quit date:  .          Alcohol:  Does not drink alcohol and never has.          Supplements:  Patient admits to use of multivitamin.          Substance Abuse History:     Last Reviewed on 2019 06:26 PM by Roney Holguin        Mental Health History:     Last Reviewed on 2019 06:26 PM by Roney Holguin        Generalized Anxiety Disorder     Major Depression         Communicable Diseases (eg STDs):     Last Reviewed on 2019 06:26 PM by Roney Holguin        Current Problems:     Last Reviewed on 2019 06:26 PM by Roney Holguin    Joint pain    Adjustment disorder with mixed anxiety and depressed mood    Depression with anxiety    Night sweats    Back pain    Breast mass    Low back pain    Low back pain    Hyperhidrosis    Diffuse arthralgia    Essential (primary) hypertension    Sleep apnea    Hypertension    Fatigue    Patient visit for long term (current) use of other drugs    Facial droop    Chronic low back pain    Constipation    Severe obesity     Morbid (severe) obesity due to excess calories    Weight loss program, follow-up    Osteopenia    Dietary surveillance and counseling    Morbid obesity    Overweight    Chronic insomnia    Bariatric surgery status    Bariatric surgery status    Excessive sweating    GERD    Gastro-esophageal reflux disease without esophagitis    Obstructive sleep apnea    Use of high risk medications    Generalized osteoarthritis, multiple sites    Primary generalized (osteo)arthritis    Hip pain    Leukocytosis, unspecified    Generalized anxiety disorder    Generalized anxiety disorder    Lipoma, other specified site    Benign lipomatous neoplasm of other sites    Constipation, unspecified    Intrinsic (allergic) eczema    Weakness    Other long term (current) drug therapy    Generalized weakness    Eczema    Screening for depression    Other fatigue    Encounter for screening for other disorder    Unspecified hearing loss, unspecified ear    Unspecified hearing loss    Dizziness    Degenerative disc disease, NOS    Dizziness and giddiness    Cervical disc disorder with myelopathy, cervicothoracic region    Lower back pain    Mid back pain    Pain in thoracic spine    Disorder of bone density and structure, unspecified    Other constipation    Other constipation        Immunizations:     Prevnar 13 (Pneumococcal PCV 13) 8/24/2015    Flulaval 11/28/2007    Fluzone (3 + years dose) 10/10/2008    Fluzone (3 + years dose) 2/22/2012    Fluzone (3 + years dose) 11/14/2012    Fluzone High-Dose pf (>=65 yr) 12/9/2013    Fluzone High-Dose pf (>=65 yr) 10/13/2014    Fluzone High-Dose pf (>=65 yr) 2/7/2017    Fluzone High-Dose pf (>=65 yr) 12/5/2018    Fluzone High-Dose pf (>=65 yr) 10/29/2019        Allergies:     Last Reviewed on 10/29/2019 01:33 PM by Mi Dela Cruz    Quibron:          Current Medications:     Last Reviewed on 10/29/2019 01:33 PM by Mi Dela Cruz    FLUoxetine 20 mg oral capsule [TAKE 1 CAPSULE BY MOUTH EVERY DAY]     multivitamin daily     Zofran 4mg Tablet [1 po q 6 hours prn N/V]    Omeprazole 40 mg oral capsule,delayed release (enteric coated) [1 capsule daily]    Sinemet 25mg/100mg Tablet [Take 1 tablet at bedtime]    OTC allergy pill     CPAP at hs     Spironolactone 25 mg oral tablet [1 tab daily]    Calcium     Melatonin     Magnesium Citrate     Meclizine HCl 25mg Tablet [Take 1 to 2 tabs twice daily as needed for dizziness.]    Diclofenac Sodium 75 mg oral tablet, delayed release (enteric coated) [1 tab bid with food]    tiZANidine 2 mg oral tablet [1 tab PO BID prn spasm]    Lactulose 10 gram/15 mL oral Solution [15 ml once a day]        Objective:        Vitals:         Historical:     10/29/2019  BP:   100/73 mm Hg ( (right arm, , sitting, );) 10/29/2019  Wt:   209.2lbs    Current: 12/11/2019 3:07:13 PM    Ht:  5 ft, 2.75 in;  Wt: 201.2 lbs;  BMI: 35.9T: 98.2 F (oral);  BP: 112/70 mm Hg (left arm, sitting);  P: 71 bpm (left arm (BP Cuff), sitting);  sCr: 0.83 mg/dL;  GFR: 60.58        Exams:     PHYSICAL EXAM:     GENERAL: well developed, well nourished;  no apparent distress;     EYES: PERRL, EOMI     E/N/T: EARS: external auditory canal normal;  the left TM is bulging and red and right TM is normal;  NOSE: normal turbinates; no sinus tenderness; OROPHARYNX: oral mucosa is normal; normal palate;     NECK: range of motion is normal; trachea is midline;     RESPIRATORY: normal respiratory rate and pattern with no distress; normal breath sounds with no rales, rhonchi, wheezes or rubs;     CARDIOVASCULAR: normal rate; rhythm is regular;     GASTROINTESTINAL: nontender; normal bowel sounds; no organomegaly;     NEUROLOGIC: mental status: alert and oriented x 3; GROSSLY INTACT     PSYCHIATRIC: appropriate affect and demeanor;         Lab/Test Results:         Influenza A and B: Negative (12/11/2019),     Performed by:: tls (12/11/2019),             Assessment:         H66.92   Otitis media, unspecified, left ear            ORDERS:         Meds Prescribed:       [New Rx] amoxicillin 875 mg oral tablet [take 1 tablet (875 mg) by oral route every 12 hours], #20 (twenty) tablets, Refills: 0 (zero)         Lab Orders:       [id number]  Infectious agent antigen detection by immunoassay; Influenza  (In-House)            96282-45  Infectious agent antigen detection by immunoassay; Influenza  (In-House)                      Plan:         Otitis media, unspecified, left earFlu test negative. VS stable.    LABORATORY:  Labs ordered to be performed today include Flu A&B Flu A Flu B.      RECOMMENDATIONS given include: Push Fluids, Rest, Follow up if no improvement or worsening symptoms like high fevers, vomiting, weakness, or increasing shortness of air.    .      FOLLOW-UP: Chronic visit follow up           Prescriptions:       [New Rx] amoxicillin 875 mg oral tablet [take 1 tablet (875 mg) by oral route every 12 hours], #20 (twenty) tablets, Refills: 0 (zero)           Orders:       [id number]  Infectious agent antigen detection by immunoassay; Influenza  (In-House)            11492-19  Infectious agent antigen detection by immunoassay; Influenza  (In-House)                  Charge Capture:         Primary Diagnosis:     H66.92  Otitis media, unspecified, left ear           Orders:      [id number]  Office/outpatient visit; established patient, level 3  (In-House)            22667  Infectious agent antigen detection by immunoassay; Influenza  (In-House)            06708-51  Infectious agent antigen detection by immunoassay; Influenza  (In-House)

## 2021-05-18 NOTE — PROGRESS NOTES
Yolanda Sharp 1940     Office/Outpatient Visit    Visit Date: Wed, Aug 29, 2018 02:00 pm    Provider: Kathleen Brown MD (Assistant: Shanae Christopher LPN)    Location: [facility]        Electronically signed by Kathleen Brown MD on  08/31/2018 09:41:10 AM                             SUBJECTIVE:        CC:     Ms. Sharp is a 77 year old White female.  left side neck, ear and head pain for 1 week;         HPI:         Ms. Sharp is here for a Medicare wellness visit.  Individual and family medical history was reviewed and updated A list of current providers and suppliers reviewed and updated A current height, weight, BMI, blood pressure, and pulse were recorded in the vitals section of the note and have been reviewed A review of cognitive impairment was performed and was negative.  A review of functional ability and level of safety was performed and was negative In regard to hearing, she reports having trouble hearing the TV/radio when others do not and having to strain to hear or understand conversations, but not wearing hearing aid(s).  Concerning home safety, she reports that at home she DOES have throw rugs, grab bars in the bath and functioning smoke alarms, but not poor lighting, a slippery bath or shower or handrails on stairs.      Immunization Status: ** Has not received pneumococcal vaccination;     Physical Activity: ** Exercises infrequently; Has not had any falls or only one fall without injury in the past year.  Advance Care Directive updated today in [facility] Preventative Health updated today         PHQ-9 Depression Screening: Completed form scanned and in chart; Total Score 9 Alcohol Consumption Screening: Completed form scanned and in chart; Total Score 0     Ms. Sharp presents with pain in her L ear, posterior head, and neck.  Pain started 8 days ago and has gotten worse.  Her daughter has told pt her neck looks swollen.  Pain is constant and sharp.  Radiates down into her  shoulder.  Takes Tramadol daily for her back which has not helped.  Has taken extra strength Tylenol but it didn't help.   Pain is worse with turning her head both to the left and right.  Pain has made it difficult to sleep.  Today her L temple started hurting.  Would rate the pain as a 12/10.  Twenty-two years ago had an episode of pain on left side of her head and neck, and the next morning woke up with L side of her face drooping.  Was told it was bells palsy.  Was given PO prednisone, and pain and drooping went away.         Hypertension details; her current cardiac medication regimen includes a diuretic, an angiotensin receptor blocker, and spironolactone.  Ms. Sharp does not check her blood pressure other than at her clinic appointments.  She is tolerating the medication well without side effects.  Compliance with treatment has been good; she takes her medication as directed, maintains her diet and exercise regimen, and follows up as directed.      Yolanda is in today to f/u for her chronic knee and LBP.  This is her face to face for her tramadol.  She is on 100 mg qid routine which allows her to be functional and ambulate with a walker.  Her pain is severe without meds.  She is tolerating the medication without side effects.  History includes S/P L THR 9/2016 and s/p gastric sleeve 7/2014.  She has tried and failed PT in past.   Her pain is radicular pain to knees B and she also has neck pain, UE weakness or paresthesias UE.  MRI in Nov 2014 showed multiple severely degenerating discs L3-5. No signs of abuse or diversion.     ROS:     CONSTITUTIONAL:  Positive for night sweats ( several months duration, attributes to foam mattress making her hot ).   Negative for chills or fever.      EYES:  Positive for blurred vision ( bilaterally; attributes to playing games on her phone, duration of 1 year ).   Negative for eye pain.      E/N/T:  Positive for diminished hearing ( bilaterally; chronic ) and tinnitus  chronic, bilateral.   Negative for ear pain.      CARDIOVASCULAR:  Positive for dizziness ( today, related to position change ).   Negative for chest pain.      RESPIRATORY:  Negative for dyspnea.          PMH/FMH/SH:     Last Reviewed on 2018 02:54 PM by Kathleen Brown    Past Medical History:         Hypertension     Gastroesophageal Reflux Disease     Osteoporosis     obesity    chronic back pain,     sleep apnea    hyperhidrosis    Chronic LBP    constipation    obesity    Depression with anxiety         GYNECOLOGICAL HISTORY:     miscarriage 1         CURRENT MEDICAL PROVIDERS:    Ophthalmologist: MORTEZA    Orthopedist: GREEN    pain management Dr Montes         PREVENTIVE HEALTH MAINTENANCE             BONE DENSITY: was last done  with the following abnormality noted-- osteopenia     COLORECTAL CANCER SCREENING: Up to date (colonoscopy q10y; sigmoidoscopy q5y; Cologuard q3y) was last done , Results are in chart     EYE EXAM: was last done  s/p cataract implants     MAMMOGRAM: Done within last 2 years and results in are chart was last done  with normal results         PAST MEDICAL HISTORY                 ADVANCED DIRECTIVES: None         Surgical History:         Appendectomy    Cholecystectomy    Hysterectomy      ortho procedure left knee; Procedures: left hand surgery 80's cataract surgery both eyes          Family History:     Father: ;  Cerebrovascular Accident     Mother: ;  Sudden Cardiac Death         Social History:     Occupation: Homemaker. OTHER (enter), retired;     Marital Status:      Children: 3 children         Tobacco/Alcohol/Supplements:     Last Reviewed on 2018 02:54 PM by Kathleen Brown    Tobacco: She has a past history of cigarette smoking; quit date:  .          Alcohol:  Does not drink alcohol and never has.          Supplements:  Patient admits to use of multivitamin.          Mental Health  History:         Generalized Anxiety Disorder      Major Depression             Immunizations:     Prevnar 13 (Pneumococcal PCV 13) 8/24/2015     Flulaval 11/28/2007     Fluzone (3 + years dose) 10/10/2008     Fluzone (3 + years dose) 2/22/2012     Fluzone (3 + years dose) 11/14/2012     Fluzone High-Dose pf (>=65 yr) 12/9/2013     Fluzone High-Dose pf (>=65 yr) 10/13/2014     Fluzone High-Dose pf (>=65 yr) 2/7/2017         Allergies:     Last Reviewed on 8/29/2018 02:09 PM by Shanae Christopher April    Quibron:        Current Medications:     Last Reviewed on 8/29/2018 02:10 PM by Shanae Christopher April    Omeprazole 40mg Capsules, Extended Release 1 capsule daily     Spironolactone 25mg Tablet 1 tab daily     Tramadol 50mg Tablet 1-2 po QID     Zofran 4mg Tablet 1 po q 6 hours prn N/V     Biotin     Calcium     CPAP at hs     OTC allergy pill     Vitamin B12     multivitamin daily     Melatonin         OBJECTIVE:        Vitals:         Current: 8/29/2018 2:09:09 PM    Ht:  5 ft, 2.75 in;  Wt: 199.2 lbs;  BMI: 35.6    T: 98.3 F (oral);  BP: 130/58 mm Hg (left arm, sitting);  P: 68 bpm (left arm (BP Cuff), sitting);  sCr: 0.82 mg/dL;  GFR: 62.04    VA: 20/25 OD, 20/30 OS (near, with correction)        Exams:         GENERAL: well nourished; well groomed; seems to be in severe pain; tearful     EYES: PERRLA; EOMI;     E/N/T: normal external ears and nose;;  Ears: the left TM is red and retracted;  right TM is normal;     NECK: neck supple, L posterior LAD with muscle spasm of trapezius;     RESPIRATORY: normal respiratory rate and pattern with no distress; lung fields normal to palpation; normal breath sounds with no rales, rhonchi, wheezes or rubs;     CARDIOVASCULAR: normal PMI placement; no thrills, heaves, or lifts; normal rate;  carotids: 2+ amplitude, no bruits;     GASTROINTESTINAL: normal bowel sounds; no organomegaly     MUSCULOSKELETAL: gait: in wheelchair;     SKIN: clear, dry intact;;     NEUROLOGIC:  cranial nerves II-XII grossly intact; reflexes: knee jerks: 2+;     PSYCHIATRIC: mental status: alert and oriented x 3; Mood/Affect: tearful;  recent and remote memory are intact; good insight and judgement;         ASSESSMENT           V70.0   Z00.00  Health checkup              DDx:     V79.0   Z13.89  Screening for depression              DDx:     382.00   H65.02  L otitis media              DDx:     389.9   H91.90  Unspecified hearing loss              DDx:     724.2   M54.5  Low back pain              DDx:     V58.69   Z79.899  Use of high risk medications              DDx:     401.1   I10  Hypertension              DDx:     530.81   K21.9  GERD              DDx:     715.09   M15.0  Generalized osteoarthritis, multiple sites              DDx:     300.02   F41.1  Generalized anxiety disorder              DDx:         ORDERS:         Meds Prescribed:       Ibuprofen 800mg Tablet Take 1 tablet(s) by mouth q8h prn  #30 (Thirty) tablet(s) Refills: 0       Augmentin (Amoxicillin/Clavulanate) 875mg/125mg Tablet 1 TAB BID  #20 (Twenty) tablet(s) Refills: 0       Hydrocodone/Acetaminophen 7.5mg/325mg Tablet 1 tablet po tid prn pain  #9 (Nine) tablet(s) Refills: 0         Radiology/Test Orders:       02248  Screening test of audiologic function, pure tone; air only  (Send-Out)         3014F  Screening mammography results documented and reviewed (PV)1  (In-House)         3017F  Colorectal CA screen results documented and reviewed (PV)  (In-House)           Lab Orders:       [id number]  Drug test prsmv read direct optical obs pr date  (Send-Out)           Procedures Ordered:         Annual wellness visit, includes a PPPS, subsequent visit  (In-House)           Other Orders:       1101F  Pt screen for fall risk; document no falls in past year or only 1 fall w/o injury in past year (KEE)  (In-House)           Negative EtOH screen  (In-House)           Calculated BMI above the upper parameter and a follow-up plan  was documented in the medical record  (In-House)                   PLAN:          Health checkup   MEDICARE WELLNESS EXAM-SHE IS DUE FOR  MAMMOGRAM, UTD ON DEXA/COLONOSCOPY, DONE WITH PAP AND PELVIC,UTD ON PNEUMOVAX/PREVNAR, DUE FOR  FLU VACCINE IN OCT, DEFERS SHINGLES/TD, SHE IS A FALL RISK DUE TO LBP AND OA-SHE USES A POWER WHEELCHAIR AND WALKER, NO MEMORY ISSUES, DEPRESSION/ANXIETY ARE STABLE, SHE LIVES ALONE WITH GOOD FAMILY SUPPORT FROM HER DAUGHTERS, SHE IS ABLE TO DRIVE AND PERFORM ADL'S INDEPENDENTLY, NO URINARY INCONTINENCE, ABLE TO DO HER OWN FINANCES,  SHE WAS GIVEN A HA ON SAFETY AND A LIVING WILL AND A PREV SERVICES HANDOUT WAS GIVEN TO HER. NO TOBACCO USE, HEARING TO BE SCREENED IN F/U. MD LIST UPDATED.              Orders:         Annual wellness visit, includes a PPPS, subsequent visit  (In-House)            Screening for depression     MIPS Current diagnosis of depression and/or bipolar disorder Negative alcohol screen     MAMMOGRAM: Done within last 2 years and results in are chart     COLORECTAL CANCER SCREENING: Results are in chart     BMI Elevated - Follow-Up Plan: She was provided education on weight loss strategies; defers dietician referral           Orders:       1101F  Pt screen for fall risk; document no falls in past year or only 1 fall w/o injury in past year (KEE)  (In-House)           Negative EtOH screen  (In-House)         3014F  Screening mammography results documented and reviewed (PV)1  (In-House)         3017F  Colorectal CA screen results documented and reviewed (PV)  (In-House)           Calculated BMI above the upper parameter and a follow-up plan was documented in the medical record  (In-House)            L otitis media I gave her 2 aleve for pain, start ibuprofen 800, change tramadol to hydrocodone for 3 days, start augmentin for 10 days           Prescriptions:       Ibuprofen 800mg Tablet Take 1 tablet(s) by mouth q8h prn  #30 (Thirty) tablet(s) Refills: 0        Augmentin (Amoxicillin/Clavulanate) 875mg/125mg Tablet 1 TAB BID  #20 (Twenty) tablet(s) Refills: 0       Hydrocodone/Acetaminophen 7.5mg/325mg Tablet 1 tablet po tid prn pain  #9 (Nine) tablet(s) Refills: 0          Unspecified hearing loss           Orders:       05365  Screening test of audiologic function, pure tone; air only  (Send-Out)            Low back pain stable on chronic pain meds that allow her to walk and be function          Use of high risk medications         RECOMMENDATIONS given include: columba reviewed, drug screen performed and appropriate, consent is reviewed and signed and on the chart, she is aware of risk of addiction on this medication and understands that she will need to follow up for a review every 3 months and her medications will be adjusted or decreased as deemed appropriate at each visit.  No personal history of drug or alcohol abuse.  No concerns about diversion or abuse.  She denies side effects related to the medication.  She is  aware that she may be called in for pill counts..            Orders:       04602  Drug test prsmv read direct optical obs pr date  (Send-Out)            Hypertension stable and well controlled          GERD stable          Generalized osteoarthritis, multiple sites stable on chronic tramadol          Generalized anxiety disorder stable             CHARGE CAPTURE           **Please note: ICD descriptions below are intended for billing purposes only and may not represent clinical diagnoses**        Primary Diagnosis:         V70.0 Health checkup            Z00.00    Encounter for general adult medical examination without abnormal findings              Orders:          10980   Preventive medicine, established patient, age 65+ years  (In-House)                Annual wellness visit, includes a PPPS, subsequent visit  (In-House)           V79.0 Screening for depression            Z13.89    Encounter for screening for other disorder               Orders:          82012 -25  Office/outpatient visit; established patient, level 4  (In-House)             1101F   Pt screen for fall risk; document no falls in past year or only 1 fall w/o injury in past year (KEE)  (In-House)                Negative EtOH screen  (In-House)             3014F   Screening mammography results documented and reviewed (PV)1  (In-House)             3017F   Colorectal CA screen results documented and reviewed (PV)  (In-House)                Calculated BMI above the upper parameter and a follow-up plan was documented in the medical record  (In-House)           382.00 L otitis media            H65.02    Acute serous otitis media, left ear    389.9 Unspecified hearing loss            H91.90    Unspecified hearing loss, unspecified ear    724.2 Low back pain            M54.5    Low back pain    V58.69 Use of high risk medications            Z79.899    Other long term (current) drug therapy    401.1 Hypertension            I10    Essential (primary) hypertension    530.81 GERD            K21.9    Gastro-esophageal reflux disease without esophagitis    715.09 Generalized osteoarthritis, multiple sites            M15.0    Primary generalized (osteo)arthritis    300.02 Generalized anxiety disorder            F41.1    Generalized anxiety disorder

## 2021-05-18 NOTE — PROGRESS NOTES
Yolanda Sharp 1940     Office/Outpatient Visit    Visit Date: Tue, Sep 4, 2018 01:31 pm    Provider: Kathleen Brown MD (Assistant: Zofia Queen LPN)    Location: [facility]        Electronically signed by Kathleen Brown MD on  2018 10:46:03 AM                             SUBJECTIVE:        CC: neck pain         HPI:     Yolanda was seen last week for neck pain and recent OM (onset 1 week prior to this)-she was treated with antibiotic and has been on this for 5 days with ibuprofen and hydrocodone for pain and her pain is no better, described as severe, very tender to touch, she has decreased ROM of neck with lateral rotation and flexion due to pain.  no radicular pain to UE and no UE paresthesias.  This pain onset last week when she awoke and was in pain, felt like her mask caused the problem and when she changed her mask this helped a little, she then changed her pillow without improvement     ROS:     CONSTITUTIONAL:  Positive for night sweats ( several months duration, attributes to foam mattress making her hot ).   Negative for chills or fever.      EYES:  Positive for blurred vision ( bilaterally; attributes to playing games on her phone, duration of 1 year ).   Negative for eye pain.      E/N/T:  Positive for diminished hearing ( bilaterally; chronic ) and tinnitus chronic, bilateral.   Negative for ear pain.      CARDIOVASCULAR:  Positive for dizziness ( today, related to position change ).   Negative for chest pain.      RESPIRATORY:  Negative for dyspnea.          PMH/FM/:     Last Reviewed on 2018 02:16 PM by Kathleen Brown    Past Medical History:         Hypertension     Gastroesophageal Reflux Disease     Osteoporosis     obesity    chronic back pain,     sleep apnea    hyperhidrosis    Chronic LBP    constipation    obesity    Depression with anxiety         GYNECOLOGICAL HISTORY:     miscarriage 1         CURRENT MEDICAL PROVIDERS:    Ophthalmologist:  MORTEZA    Orthopedist: [facility]    pain management Dr Montes         PREVENTIVE HEALTH MAINTENANCE             BONE DENSITY: was last done  with the following abnormality noted-- osteopenia     COLORECTAL CANCER SCREENING: Up to date (colonoscopy q10y; sigmoidoscopy q5y; Cologuard q3y) was last done , Results are in chart     EYE EXAM: was last done  s/p cataract implants     MAMMOGRAM: Done within last 2 years and results in are chart was last done  with normal results         PAST MEDICAL HISTORY                 ADVANCED DIRECTIVES: None         Surgical History:         Appendectomy    Cholecystectomy    Hysterectomy      ortho procedure left knee; Procedures: left hand surgery 80's cataract surgery both eyes          Family History:     Father: ;  Cerebrovascular Accident     Mother: ;  Sudden Cardiac Death         Social History:     Occupation: Homemaker. OTHER (enter), retired;     Marital Status:      Children: 3 children         Tobacco/Alcohol/Supplements:     Last Reviewed on 2018 02:16 PM by Kathleen Brown    Tobacco: She has a past history of cigarette smoking; quit date:  .          Alcohol:  Does not drink alcohol and never has.          Supplements:  Patient admits to use of multivitamin.          Mental Health History:         Generalized Anxiety Disorder      Major Depression             Allergies:     Last Reviewed on 2018 02:09 PM by Shanae Christohper April    Quibron:        Current Medications:     Last Reviewed on 2018 02:10 PM by Shanae Christopher April    Omeprazole 40mg Capsules, Extended Release 1 capsule daily     Spironolactone 25mg Tablet 1 tab daily     Tramadol 50mg Tablet 1-2 po QID     Zofran 4mg Tablet 1 po q 6 hours prn N/V     Augmentin 875mg/125mg Tablet 1 TAB BID     Hydrocodone/Acetaminophen 7.5mg/325mg Tablet 1 tablet po tid prn pain     Ibuprofen 800mg Tablet Take 1 tablet(s) by mouth q8h prn      Melatonin     Biotin     Calcium     CPAP at hs     OTC allergy pill     Vitamin B12     multivitamin daily         OBJECTIVE:        Vitals:         Current: 9/4/2018 1:35:16 PM    Ht:  5 ft, 2.75 in;  Wt: 197.2 lbs;  BMI: 35.2    T: 97.9 F (oral);  BP: 116/66 mm Hg (left arm, sitting);  P: 66 bpm (left arm (BP Cuff), sitting);  sCr: 0.82 mg/dL;  GFR: 61.77        Exams:     PHYSICAL EXAM:     GENERAL: vital signs recorded - well developed, well nourished,  moderately obese;  no apparent distress;     EYES: extraocular movements intact; conjunctiva and cornea are normal; PERRLA;     E/N/T: EARS: external auditory canal normal bilaterally;  the left TM is has fluid behind it, red, and improved and right TM is normal;  OROPHARYNX:  normal mucosa, dentition, gingiva, and posterior pharynx;     NECK: range of motion is decreased with side flexion (to the left and right) and rotation in either direction;  thyroid is non-palpable; neck tender to palpation along L paracervical muscles;     RESPIRATORY: normal respiratory rate and pattern with no distress; normal breath sounds with no rales, rhonchi, wheezes or rubs;     CARDIOVASCULAR: normal rate; rhythm is regular;  no systolic murmur; no edema;     GASTROINTESTINAL: nontender, nondistended; no hepatosplenomegaly or masses; no bruits;     MUSCULOSKELETAL: gait: slowed, stooped, and uses a cane;  good UE  strength B, normal B sensory exam, 2/4 brachioradial DTR B     NEUROLOGIC: mental status: oriented to person, place, and time;  cranial nerves II-XII grossly intact;     PSYCHIATRIC: appropriate affect and demeanor; normal psychomotor function;         ASSESSMENT           780.4   R42  Dizziness              DDx:     723.1   S16.1XXD  Neck pain              DDx:     722.6   M50.03  Degenerative disc disease, NOS              DDx:         ORDERS:         Meds Prescribed:       Medrol (Methylprednisolone) 4mg Dosepak Take as directed with food  #1 (One) dose pack Refills:  0       Meclizine HCl 25mg Tablet Take 1 tablet(s) by mouth tid PRN DIZZINESS  #40 (Forty) tablet(s) Refills: 1         Radiology/Test Orders:       [id number]  CT cervical spine without contrast  (Send-Out; Stat)         63698  Radiologic examination; neck, soft tissue  (Send-Out; Stat)           Procedures Ordered:       REFER  Referral to Specialist or Other Facility  (Send-Out)                   PLAN:          Dizziness complete amox for her otitis media infection           Prescriptions:       Medrol (Methylprednisolone) 4mg Dosepak Take as directed with food  #1 (One) dose pack Refills: 0       Meclizine HCl 25mg Tablet Take 1 tablet(s) by mouth tid PRN DIZZINESS  #40 (Forty) tablet(s) Refills: 1          Neck pain can we have radiology look at soft tissue in L posterior neck in paracervical muscles-she is having severe pain here, I think it is her DDD of Cspine but I want to make sure soft tissue looks ok, too.   Pt aware of results and has a copy of report-severe DDD in C spine with moderate central canal narrowing and moderate to severe L and R neural foraminal narrowing-will refer pt to neurosurgery and if this is not surgical then to pain management.         RADIOLOGY:  I have ordered CT of Cervical Spine w/o contrast and a neck soft tissue x-ray to be done today.            Orders:       [id number]  CT cervical spine without contrast  (Send-Out; Stat)         57158  Radiologic examination; neck, soft tissue  (Send-Out; Stat)            Degenerative disc disease, NOS she needs process started for a power wheelchair         REFERRALS:  Referral initiated to a neurosurgeon ( Dr. Aidan Ferreira ) and physical therapy ( [facility] ).            Orders:       REFER  Referral to Specialist or Other Facility  (Send-Out)               CHARGE CAPTURE           **Please note: ICD descriptions below are intended for billing purposes only and may not represent clinical diagnoses**        Primary  Diagnosis:         780.4 Dizziness            R42    Dizziness and giddiness              Orders:          88207   Office/outpatient visit; established patient, level 4  (In-House)           723.1 Neck pain            S16.1XXD    Strain of muscle, fascia and tendon at neck level, subsequent encounter    722.6 Degenerative disc disease, NOS            M50.03    Cervical disc disorder with myelopathy, cervicothoracic region

## 2021-05-18 NOTE — PROGRESS NOTES
Yolanda SharpRazia 1940     Office/Outpatient Visit    Visit Date: Fri, Mar 22, 2019 02:28 pm    Provider: Kathleen Brown MD (Assistant: Judith Jackman LPN)    Location: [facility]        Electronically signed by Kathleen Brown MD on  2019 01:23:16 PM                             SUBJECTIVE:        HPI:     atypical skin lesion non healing on R face beside her nose-she had a pimple there about 4 months ago, yellow like lesion, she tried to pop it and a crater formed and will not heal.     acute mid back pain on R side, new for her, pain started yesterday, dysuria is present.  She is also coughing up green phlem.         Concerning uRI, these have been present for the past one to two days.  The symptoms include productive cough, nasal congestion and nasal discharge.  She denies body aches, chest congestion, dizziness, fever, headache, sinus pain/pressure or wheezing.  She denies exposure to ill contacts.  She has not tried any medications for symptomatic relief.      ROS:     CONSTITUTIONAL:  Negative for chills, fatigue, fever, and weight change.      EYES:  Positive for eye drainage ( clear to yellow left eye ).   Negative for blurred vision, eye pain or photophobia.      CARDIOVASCULAR:  Negative for chest pain, palpitations, tachycardia, orthopnea, and edema.      RESPIRATORY:  Negative for cough, dyspnea, and hemoptysis.      MUSCULOSKELETAL:  Negative for arthralgias, back pain, and myalgias.      NEUROLOGICAL:  Negative for dizziness, headaches, paresthesias, and weakness.          PMH/FM/SH:     Last Reviewed on 3/22/2019 03:22 PM by Kathleen Brown    Past Medical History:         Hypertension     Gastroesophageal Reflux Disease     Osteoporosis     obesity    chronic back pain,     sleep apnea    hyperhidrosis    Chronic LBP    constipation    obesity    Depression with anxiety         GYNECOLOGICAL HISTORY:     miscarriage 1         CURRENT MEDICAL PROVIDERS:    Ophthalmologist:  MORTEZA    Orthopedist: GREEN    pain management Dr Montes         PREVENTIVE HEALTH MAINTENANCE             BONE DENSITY: was last done  with the following abnormality noted-- osteopenia     COLORECTAL CANCER SCREENING: Up to date (colonoscopy q10y; sigmoidoscopy q5y; Cologuard q3y) was last done , Results are in chart     EYE EXAM: was last done  s/p cataract implants     MAMMOGRAM: Done within last 2 years and results in are chart was last done  with normal results         PAST MEDICAL HISTORY                 ADVANCED DIRECTIVES: None         Surgical History:         Appendectomy    Cholecystectomy    Hysterectomy      ortho procedure left knee; Procedures: left hand surgery 80's cataract surgery both eyes          Family History:     Father: ;  Cerebrovascular Accident     Mother: ;  Sudden Cardiac Death         Social History:     Occupation: Homemaker. OTHER (enter), retired;     Marital Status:      Children: 3 children         Tobacco/Alcohol/Supplements:     Last Reviewed on 3/22/2019 03:22 PM by Kathleen Brown    Tobacco: She has a past history of cigarette smoking; quit date:  .          Alcohol:  Does not drink alcohol and never has.          Supplements:  Patient admits to use of multivitamin.          Mental Health History:         Generalized Anxiety Disorder      Major Depression             Allergies:     Last Reviewed on 2019 12:00 PM by Dirk Piña    Quibron:        Current Medications:     Last Reviewed on 2019 12:00 PM by Dirk Piña    Zofran 4mg Tablet 1 po q 6 hours prn N/V     Omeprazole 40mg Capsules, Extended Release 1 capsule daily     Sinemet 25mg/100mg Tablet Take 1 tablet at bedtime     Spironolactone 25mg Tablet 1 tab daily     Fluoxetine 20mg Capsules 1 capsule daily     Tramadol 50mg Tablet 1-2 po QID     Magnesium Citrate     Melatonin     Biotin     Calcium     CPAP at hs     OTC allergy pill      Vitamin B12     multivitamin daily         OBJECTIVE:        Vitals:         Current: 3/22/2019 2:35:11 PM    Ht:  5 ft, 2.75 in;  Wt: 200.6 lbs;  BMI: 35.8    T: 98.9 F (oral);  BP: 124/76 mm Hg (right arm, sitting);  P: 77 bpm (right arm (BP Cuff), sitting);  sCr: 0.82 mg/dL;  GFR: 61.24        Exams:     PHYSICAL EXAM:     GENERAL:  well developed and nourished; appropriately groomed; in no apparent distress;     EYES: PERRL, EOMI     E/N/T: EARS:;     RESPIRATORY: normal respiratory rate and pattern with no distress; normal breath sounds with no rales, rhonchi, wheezes or rubs; no CVT to percussion     CARDIOVASCULAR: normal rate; rhythm is regular;     BREAST/INTEGUMENT: 3 mm crater with irregular borders, no erythema or edema;     NEUROLOGIC: mental status: alert and oriented x 3; GROSSLY INTACT     PSYCHIATRIC:  appropriate affect and demeanor; normal speech pattern; grossly normal memory;         Lab/Test Results:             Influenza A and B:  Negative (03/22/2019),     Performed by::  tls (03/22/2019),             ASSESSMENT           238.2   D48.5  Atypical skin lesion              DDx:     724.2   M54.5  Lower back pain              DDx:     465.8   J06.9  URI              DDx:         ORDERS:         Radiology/Test Orders:       [id number]  Radiologic exam chest 2 views  (Send-Out; Stat)           Lab Orders:       [id number]  Infectious agent antigen detection by immunoassay; Influenza  (In-House)         25397  [facility] Urinalysis, automated, with micro  (Send-Out; Stat)         64782-03  Infectious agent antigen detection by immunoassay; Influenza  (In-House)                   PLAN:          Atypical skin lesion looks like cancer-referral to Dr Albertina Tinoco next week please if possible-she needs a Wed appt if possible due to her need for assistance for a ride.          Lower back pain    LABORATORY:  Labs ordered to be performed today include urinalysis with micro.            Orders:       [id number]  [facility] Urinalysis, automated, with micro  (Send-Out; Stat)            URI         RADIOLOGY:  I have ordered a chest x-ray (PA and lateral) to be done today.            Orders:       21741  Infectious agent antigen detection by immunoassay; Influenza  (In-House)         20352  Radiologic exam chest 2 views  (Send-Out; Stat)         96966-90  Infectious agent antigen detection by immunoassay; Influenza  (In-House)               CHARGE CAPTURE           **Please note: ICD descriptions below are intended for billing purposes only and may not represent clinical diagnoses**        Primary Diagnosis:         238.2 Atypical skin lesion            D48.5    Neoplasm of uncertain behavior of skin              Orders:          07212   Office/outpatient visit; established patient, level 4  (In-House)           724.2 Lower back pain            M54.5    Low back pain    465.8 URI            J06.9    Acute upper respiratory infection, unspecified              Orders:          94943   Infectious agent antigen detection by immunoassay; Influenza  (In-House)             39103 -59  Infectious agent antigen detection by immunoassay; Influenza  (In-House)               ADDENDUMS:      ____________________________________    Addendum: 03/25/2019 09:26 AM - Naina Roberts         Visit Note Faxed to:        Albertina Tinoco (Dermatology); Number [phone]

## 2021-05-18 NOTE — PROGRESS NOTES
Yolanda Sharp  1940     Office/Outpatient Visit    Visit Date: Mon, Sep 21, 2020 04:03 pm    Provider: Kathleen Brown MD (Assistant: Dirk Piña, )    Location: [facility]        Electronically signed by Kathleen Brown MD on  09/24/2020 10:36:59 AM                             Subjective:        CC: (not taking colace)    HPI:           PHQ-9 Depression Screening: Completed form scanned and in chart; Total Score 13           Additionally, she presents with history of essential (primary) hypertension.  her current cardiac medication regimen includes a diuretic, an angiotensin receptor blocker, and spironolactone.  Ms. Sharp does not check her blood pressure other than at her clinic appointments.  She is tolerating the medication well without side effects.  Compliance with treatment has been good; she takes her medication as directed, maintains her diet and exercise regimen, and follows up as directed.        Yolanda is having confusion and this onset over a year ago, she was treated with meclizine that helped a lot, she was given 40 pills and taking bid and kept running out of meds.  She has FH strokes in Father and Mother and brother.  She just feels off.  She also has ringing in her ears with hearing loss B    ROS:     CONSTITUTIONAL:  Negative for chills and fever.      CARDIOVASCULAR:  Positive for dizziness.   Negative for chest pain, palpitations or edema.      RESPIRATORY:  Negative for dyspnea and cough.      GASTROINTESTINAL:  Positive for constipation.   Negative for abdominal pain or diarrhea.      MUSCULOSKELETAL:  Positive for bilateral knee pain, left hip pain, back pain and neck pain..      INTEGUMENTARY/BREAST:  Negative for rash.      NEUROLOGICAL:  Positive for paresthesias and weakness.      PSYCHIATRIC:  Negative for anxiety, depression, and sleep disturbances.          Past Medical History / Family History / Social History:         Last Reviewed on 9/21/2020  04:32 PM by Kathleen Brown    Past Medical History:         Hypertension     Gastroesophageal Reflux Disease     Osteoporosis     obesity    chronic back pain,     sleep apnea    hyperhidrosis    Chronic LBP    constipation    obesity    Depression with anxiety         GYNECOLOGICAL HISTORY:     miscarriage 1         CURRENT MEDICAL PROVIDERS:    Ophthalmologist: MORTEZA    Orthopedist: GRE[facility]    pain management Dr Montes         PREVENTIVE HEALTH MAINTENANCE             BONE DENSITY: was last done  with the following abnormality noted-- osteopenia     COLORECTAL CANCER SCREENING: Up to date (colonoscopy q10y; sigmoidoscopy q5y; Cologuard q3y) was last done , Results are in chart     EYE EXAM: was last done  s/p cataract implants     MAMMOGRAM: Done within last 2 years and results in are chart was last done  with normal results         PAST MEDICAL HISTORY                 ADVANCED DIRECTIVES: None         Surgical History:         Appendectomy    Cholecystectomy    Hysterectomy     ortho procedure left knee; Procedures: left hand surgery 80's cataract surgery both eyes          Family History:     Father: ;  Cerebrovascular Accident     Mother: ;  Sudden Cardiac Death         Social History:     Occupation: Homemaker. OTHER (enter), retired;     Marital Status:      Children: 3 children         Tobacco/Alcohol/Supplements:     Last Reviewed on 2020 04:07 PM by Dirk Piña    Tobacco: She has a past history of cigarette smoking; quit date:  .          Alcohol:  Does not drink alcohol and never has.          Supplements:  Patient admits to use of multivitamin.          Substance Abuse History:     Last Reviewed on 2019 06:26 PM by Roney Holguin        Mental Health History:     Last Reviewed on 2019 06:26 PM by Roney Holguin        Generalized Anxiety Disorder     Major Depression         Communicable Diseases (eg STDs):     Last  Reviewed on 9/24/2019 06:26 PM by Roney Holguin        Allergies:     Last Reviewed on 6/18/2020 02:21 PM by Giovanna Lockwood    Quibron:          Current Medications:     Last Reviewed on 6/18/2020 02:21 PM by Giovanna Lockwood    FLUoxetine 20 mg oral capsule [TAKE ONE CAPSULE BY MOUTH EVERY DAY]    multivitamin daily     ondansetron HCL 4 mg oral tablet [1 BY MOUTH EVERY 6 HOURS AS NEEDED FOR NAUSEA AND VOMITING]    omeprazole 40 mg oral capsule,delayed release (enteric coated) [TAKE ONE CAPSULE BY MOUTH EVERY DAY]    carbidopa-levodopa  mg oral tablet [TAKE ONE TABLET BY MOUTH EVERY NIGHT AT BEDTIME]    OTC allergy pill     CPAP at hs     spironolactone 25 mg oral tablet [TAKE ONE TABLET BY MOUTH EVERY DAY]    Calcium     Melatonin     Magnesium Citrate     meclizine 25 mg oral tablet [TAKE 1 TO 2 TABLETS BY MOUTH TWICE DAILY AS NEEDED FOR DIZZINESS]    diclofenac sodium 75 mg oral tablet, delayed release (enteric coated) [TAKE ONE TABLET BY MOUTH TWICE DAILY WITH FOOD]    triamcinolone acetonide 0.025 % Topical Cream [apply a thin layer to the affected area(s) by topical route 2 times per day]    Colace 100 mg oral capsule [take 1 capsule (100 mg) by oral route 2 times per day]        Objective:        Vitals:         Current: 9/21/2020 4:11:05 PM    Ht:  5 ft, 2.75 in;  Wt: 215.8 lbs;  BMI: 38.5T: 98 F (temporal);  BP: 123/81 mm Hg (left arm, sitting);  P: 73 bpm (left arm (BP Cuff), sitting);  sCr: 0.83 mg/dL;  GFR: 61.42        Exams:     PHYSICAL EXAM:     GENERAL: vital signs recorded - well developed, well nourished;  well groomed;  no apparent distress;     EYES: PERRL, EOMI     E/N/T: EARS: external auditory canal occluded by cerumen bilaterally;  OROPHARYNX:  normal mucosa, dentition, gingiva, and posterior pharynx;     NECK: supple, no LAD;     RESPIRATORY: normal respiratory rate and pattern with no distress;     CARDIOVASCULAR: normal rate; rhythm is regular;  a systolic murmur is noted: it is  grade 3/6 and Character soft;     GASTROINTESTINAL: nontender, nondistended; no hepatosplenomegaly or masses; no bruits;     NEUROLOGIC: mental status: oriented to person, place, and time;  GROSSLY INTACT     PSYCHIATRIC:  appropriate affect and demeanor; normal speech pattern; grossly normal memory;         Lab/Test Results:         TSH: 2.530 (mIU/L) (05/01/2017),     Creatinine, Serum: 0.83 (mg/dl) (10/29/2019),     Glom Filt Rate, Est: >60 (ml/min/1.73m2) (10/29/2019),     Alkaline Phosphatase, Serum: 84 (U/L) (10/29/2019),     ALT (SGPT): 14 (U/L) (10/29/2019),     AST (SGOT): 18 (U/L) (10/29/2019),             Procedures:     Impacted cerumen, bilateral    Cerumen impaction is noted in both ears The degree of wax accumulation is moderate in the left ear and right ear.  With moderate dificulty, using a syringe irrigation and ear currette, the wax is removed.  Removed from ear was hard balls of wax.  The patient tolerated the procedure well.      There were no complications.  Performed by: KG , Dr Brown returned to room for left ear irrigation             Assessment:         M54.5   Low back pain       I10   Essential (primary) hypertension       K21.9   Gastro-esophageal reflux disease without esophagitis       Z98.84   Bariatric surgery status       M15.0   Primary generalized (osteo)arthritis       F41.1   Generalized anxiety disorder       F43.23   Adjustment disorder with mixed anxiety and depressed mood       R41.0   Disorientation, unspecified       H81.03   Meniere's disease, bilateral       H91.93   Unspecified hearing loss, bilateral       Z13.31   Encounter for screening for depression       Z23   Encounter for immunization       K59.00   Constipation, unspecified       H61.23   Impacted cerumen, bilateral           ORDERS:         Meds Prescribed:       [Refilled] FLUoxetine 20 mg oral capsule [TAKE 1CAPSULE po bid], #60 (sixty) capsules, Refills: 2 (two)       [Refilled] meclizine 25 mg oral tablet  [TAKE 1 TABLETS BY MOUTH TWICE DAILY ], #60 (sixty) tablets, Refills: 2 (two)         Lab Orders:       [id number]  [facility] Comp. Metabolic Panel  (Send-Out)            77040  BDCB2 - [facility] CBC w/o diff  (Send-Out)            82437  THYII [facility] Thyroid panel with TSH (35062, 25864)  (Send-Out)              Procedures Ordered:       [id number]  Fluzone High Dose  (In-House)            REFER  Referral to Specialist or Other Facility  (Send-Out)            88144  Removal of impacted cerumen BILATERAL (NURSE)  (In-House)              Other Orders:         Administration of influenza virus vaccine  (x1)                  Plan:         Low back painstable        Essential (primary) hypertensionstable and well controlled    LABORATORY:  Labs ordered to be performed today include Comprehensive metabolic panel.            Orders:       [id number]  [facility] Comp. Metabolic Panel  (Send-Out)              Gastro-esophageal reflux disease without esophagitis        RECOMMENDATIONS given include: patient is aware of increased risk of kidney failure, osteoporosis and alzheimers with daily use of this med.          Bariatric surgery statusweight is stable        Primary generalized (osteo)arthritisstop diclofenac        RECOMMENDATIONS given include: try NSAID, patient  is told to  watch for upset stomach, PUD/GI bleeding and aware of increased risk of CAD.          Generalized anxiety disorderstable on prozac        Adjustment disorder with mixed anxiety and depressed moodworse, will increase her prozac        Disorientation, unspecifieddue to menieres disease        Meniere's disease, bilateralstop diclofenac, will restart her meclizine    LABORATORY:  Labs ordered to be performed today include CBC W/O DIFF and Thyroid Panel.            Prescriptions:       [Refilled] FLUoxetine 20 mg oral capsule [TAKE 1CAPSULE po bid], #60 (sixty) capsules, Refills: 2 (two)       [Refilled] meclizine 25 mg oral tablet [TAKE 1 TABLETS BY MOUTH TWICE  DAILY ], #60 (sixty) tablets, Refills: 2 (two)           Orders:       01724  BDCB2 - [facility] CBC w/o diff  (Send-Out)            25743  THYII - [facility] Thyroid panel with TSH (87562, 06158)  (Send-Out)              Unspecified hearing loss, bilateral        REFERRALS:  Referral initiated to an E/N/T ( Dr. Ranulfo Greenwood, [facility]; Dr Von Lockwood, [facility]; for evaluation of vertigo and hearing loss ).            Orders:       REFER  Referral to Specialist or Other Facility  (Send-Out)              Encounter for screening for depressionwill increase prozac to 40 mg daily, no suicidal ideation        Encounter for immunization          Immunizations:       35327  Fluzone High Dose  (In-House)                Dose (ml): 0.7  Site: left deltoid  Route: intramuscular  Administered by: Dirk Piña          : [employer]  Lot #: [id number]  Exp: 06/30/2021          NDC: [address]-12        Administration of influenza virus vaccine  (x1)          Constipation, unspecifiedcont colace and add 4 prunes a day        Impacted cerumen, bilateral        TESTS/PROCEDURES:  Will proceed with Cerumen Removal--by Nurse: Both ears, to be performed/scheduled now.            Orders:       [id number]  Removal of impacted cerumen BILATERAL (NURSE)  (In-House)                  Charge Capture:         Primary Diagnosis:     M54.5  Low back pain           Orders:      79823  Office/outpatient visit; established patient, level 4  (In-House)              I10  Essential (primary) hypertension     K21.9  Gastro-esophageal reflux disease without esophagitis     Z98.84  Bariatric surgery status     M15.0  Primary generalized (osteo)arthritis     F41.1  Generalized anxiety disorder     F43.23  Adjustment disorder with mixed anxiety and depressed mood     R41.0  Disorientation, unspecified     H81.03  Meniere's disease, bilateral     H91.93  Unspecified hearing loss, bilateral     Z13.31  Encounter for screening for depression     Z23   Encounter for immunization           Orders:      06276  Fluzone High Dose  (In-House)              Administration of influenza virus vaccine  (x1)          K59.00  Constipation, unspecified     H61.23  Impacted cerumen, bilateral           Orders:      03349  Removal of impacted cerumen BILATERAL (NURSE)  (In-House)

## 2021-05-19 ENCOUNTER — HOSPITAL ENCOUNTER (OUTPATIENT)
Dept: OTHER | Facility: HOSPITAL | Age: 81
Discharge: HOME | End: 2021-05-19
Attending: FAMILY MEDICINE

## 2021-05-19 LAB
ALBUMIN SERPL-MCNC: 3.7 G/DL (ref 3.5–5)
ALBUMIN/GLOB SERPL: 1.2 {RATIO} (ref 1.4–2.6)
ALP SERPL-CCNC: 70 U/L (ref 43–160)
ALT SERPL-CCNC: 7 U/L (ref 10–40)
ANION GAP SERPL CALC-SCNC: 17 MMOL/L (ref 8–19)
AST SERPL-CCNC: 18 U/L (ref 15–50)
BASOPHILS # BLD MANUAL: 0.05 10*3/UL (ref 0–0.2)
BASOPHILS NFR BLD MANUAL: 0.7 % (ref 0–3)
BILIRUB SERPL-MCNC: 0.28 MG/DL (ref 0.2–1.3)
BUN SERPL-MCNC: 12 MG/DL (ref 5–25)
BUN/CREAT SERPL: 14 {RATIO} (ref 6–20)
CALCIUM SERPL-MCNC: 9.4 MG/DL (ref 8.7–10.4)
CHLORIDE SERPL-SCNC: 103 MMOL/L (ref 99–111)
CONV CO2: 24 MMOL/L (ref 22–32)
CONV TOTAL PROTEIN: 6.8 G/DL (ref 6.3–8.2)
CREAT UR-MCNC: 0.84 MG/DL (ref 0.5–0.9)
DEPRECATED RDW RBC AUTO: 44.7 FL
EOSINOPHIL # BLD MANUAL: 0.31 10*3/UL (ref 0–0.7)
EOSINOPHIL NFR BLD MANUAL: 4.2 % (ref 0–7)
ERYTHROCYTE [DISTWIDTH] IN BLOOD BY AUTOMATED COUNT: 13.9 % (ref 11.5–14.5)
EST. AVERAGE GLUCOSE BLD GHB EST-MCNC: 114 MG/DL
GFR SERPLBLD BASED ON 1.73 SQ M-ARVRAT: >60 ML/MIN/{1.73_M2}
GLOBULIN UR ELPH-MCNC: 3.1 G/DL (ref 2–3.5)
GLUCOSE SERPL-MCNC: 96 MG/DL (ref 65–99)
GRANS (ABSOLUTE): 4.22 10*3/UL (ref 2–8)
GRANS: 56.9 % (ref 30–85)
HBA1C MFR BLD: 12.4 G/DL (ref 12–16)
HBA1C MFR BLD: 5.6 % (ref 3.5–5.7)
HCT VFR BLD AUTO: 38.4 % (ref 37–47)
IMM GRANULOCYTES # BLD: 0.01 10*3/UL (ref 0–0.54)
IMM GRANULOCYTES NFR BLD: 0.1 % (ref 0–0.43)
LYMPHOCYTES # BLD MANUAL: 2.2 10*3/UL (ref 1–5)
LYMPHOCYTES NFR BLD MANUAL: 8.4 % (ref 3–10)
MCH RBC QN AUTO: 28.1 PG (ref 27–31)
MCHC RBC AUTO-ENTMCNC: 32.3 G/DL (ref 33–37)
MCV RBC AUTO: 86.9 FL (ref 81–99)
MONOCYTES # BLD AUTO: 0.62 10*3/UL (ref 0.2–1.2)
OSMOLALITY SERPL CALC.SUM OF ELEC: 290 MOSM/KG (ref 273–304)
PLATELET # BLD AUTO: 339 10*3/UL (ref 130–400)
PMV BLD AUTO: 9.9 FL (ref 7.4–10.4)
POTASSIUM SERPL-SCNC: 4 MMOL/L (ref 3.5–5.3)
RBC # BLD AUTO: 4.42 10*6/UL (ref 4.2–5.4)
SODIUM SERPL-SCNC: 140 MMOL/L (ref 135–147)
TSH SERPL-ACNC: 3.23 M[IU]/L (ref 0.27–4.2)
VARIANT LYMPHS NFR BLD MANUAL: 29.7 % (ref 20–45)
VIT B12 SERPL-MCNC: 505 PG/ML (ref 211–911)
WBC # BLD AUTO: 7.41 10*3/UL (ref 4.8–10.8)

## 2021-07-01 VITALS
BODY MASS INDEX: 37.07 KG/M2 | TEMPERATURE: 98.8 F | SYSTOLIC BLOOD PRESSURE: 138 MMHG | HEART RATE: 69 BPM | DIASTOLIC BLOOD PRESSURE: 58 MMHG | HEIGHT: 63 IN | WEIGHT: 209.2 LBS

## 2021-07-01 VITALS
HEIGHT: 63 IN | DIASTOLIC BLOOD PRESSURE: 87 MMHG | HEART RATE: 77 BPM | SYSTOLIC BLOOD PRESSURE: 139 MMHG | WEIGHT: 198.1 LBS | TEMPERATURE: 97.6 F | BODY MASS INDEX: 35.1 KG/M2

## 2021-07-01 VITALS
TEMPERATURE: 98.2 F | DIASTOLIC BLOOD PRESSURE: 70 MMHG | HEART RATE: 71 BPM | BODY MASS INDEX: 35.65 KG/M2 | HEIGHT: 63 IN | SYSTOLIC BLOOD PRESSURE: 112 MMHG | WEIGHT: 201.2 LBS

## 2021-07-01 VITALS
TEMPERATURE: 98.3 F | HEART RATE: 68 BPM | DIASTOLIC BLOOD PRESSURE: 58 MMHG | HEIGHT: 63 IN | BODY MASS INDEX: 35.3 KG/M2 | SYSTOLIC BLOOD PRESSURE: 130 MMHG | WEIGHT: 199.2 LBS

## 2021-07-01 VITALS
DIASTOLIC BLOOD PRESSURE: 66 MMHG | HEIGHT: 63 IN | BODY MASS INDEX: 34.94 KG/M2 | WEIGHT: 197.2 LBS | SYSTOLIC BLOOD PRESSURE: 116 MMHG | HEART RATE: 66 BPM | TEMPERATURE: 97.9 F

## 2021-07-01 VITALS
SYSTOLIC BLOOD PRESSURE: 142 MMHG | HEART RATE: 80 BPM | HEIGHT: 63 IN | TEMPERATURE: 97.7 F | WEIGHT: 199.2 LBS | BODY MASS INDEX: 35.3 KG/M2 | DIASTOLIC BLOOD PRESSURE: 77 MMHG

## 2021-07-01 VITALS
SYSTOLIC BLOOD PRESSURE: 124 MMHG | BODY MASS INDEX: 35.54 KG/M2 | WEIGHT: 200.6 LBS | DIASTOLIC BLOOD PRESSURE: 76 MMHG | TEMPERATURE: 98.9 F | HEART RATE: 77 BPM | HEIGHT: 63 IN

## 2021-07-01 VITALS
HEIGHT: 63 IN | HEART RATE: 72 BPM | SYSTOLIC BLOOD PRESSURE: 142 MMHG | BODY MASS INDEX: 35.61 KG/M2 | DIASTOLIC BLOOD PRESSURE: 74 MMHG | WEIGHT: 201 LBS | TEMPERATURE: 98.1 F

## 2021-07-01 VITALS
HEART RATE: 66 BPM | SYSTOLIC BLOOD PRESSURE: 100 MMHG | WEIGHT: 209.2 LBS | TEMPERATURE: 98.8 F | DIASTOLIC BLOOD PRESSURE: 73 MMHG | HEIGHT: 63 IN | BODY MASS INDEX: 37.07 KG/M2

## 2021-07-02 VITALS
HEART RATE: 74 BPM | BODY MASS INDEX: 35.61 KG/M2 | HEIGHT: 63 IN | DIASTOLIC BLOOD PRESSURE: 79 MMHG | WEIGHT: 201 LBS | RESPIRATION RATE: 16 BRPM | SYSTOLIC BLOOD PRESSURE: 138 MMHG

## 2021-07-02 VITALS
TEMPERATURE: 98 F | HEART RATE: 73 BPM | DIASTOLIC BLOOD PRESSURE: 81 MMHG | HEIGHT: 63 IN | BODY MASS INDEX: 38.24 KG/M2 | WEIGHT: 215.8 LBS | SYSTOLIC BLOOD PRESSURE: 123 MMHG

## 2021-07-02 VITALS
HEIGHT: 63 IN | WEIGHT: 210.4 LBS | HEART RATE: 73 BPM | TEMPERATURE: 98.5 F | SYSTOLIC BLOOD PRESSURE: 122 MMHG | DIASTOLIC BLOOD PRESSURE: 70 MMHG | BODY MASS INDEX: 37.28 KG/M2

## 2021-07-02 VITALS
DIASTOLIC BLOOD PRESSURE: 68 MMHG | TEMPERATURE: 97.3 F | HEART RATE: 73 BPM | WEIGHT: 211.8 LBS | HEIGHT: 63 IN | BODY MASS INDEX: 37.53 KG/M2 | SYSTOLIC BLOOD PRESSURE: 127 MMHG

## 2021-07-07 RX ORDER — FLUOXETINE HYDROCHLORIDE 20 MG/1
20 CAPSULE ORAL 2 TIMES DAILY
COMMUNITY
End: 2021-07-30

## 2021-07-07 RX ORDER — MULTIPLE VITAMINS W/ MINERALS TAB 9MG-400MCG
TAB ORAL
COMMUNITY

## 2021-07-07 RX ORDER — DICLOFENAC SODIUM 75 MG/1
75 TABLET, DELAYED RELEASE ORAL DAILY
COMMUNITY
End: 2021-10-20

## 2021-07-07 RX ORDER — MECLIZINE HCL 25MG 25 MG/1
25 TABLET, CHEWABLE ORAL 2 TIMES DAILY
COMMUNITY
End: 2021-07-07 | Stop reason: SDUPTHER

## 2021-07-07 RX ORDER — DOCUSATE SODIUM 100 MG/1
100 CAPSULE, LIQUID FILLED ORAL 2 TIMES DAILY
COMMUNITY
End: 2022-04-04

## 2021-07-07 RX ORDER — LANOLIN ALCOHOL/MO/W.PET/CERES
CREAM (GRAM) TOPICAL
COMMUNITY

## 2021-07-07 RX ORDER — SPIRONOLACTONE 25 MG/1
1 TABLET ORAL DAILY
COMMUNITY
End: 2021-10-20 | Stop reason: SDUPTHER

## 2021-07-07 RX ORDER — FAMOTIDINE 40 MG/1
40 TABLET, FILM COATED ORAL 2 TIMES DAILY
COMMUNITY
End: 2021-11-01

## 2021-07-07 RX ORDER — ONDANSETRON 4 MG/1
1 TABLET, ORALLY DISINTEGRATING ORAL EVERY 6 HOURS PRN
COMMUNITY
End: 2022-10-11

## 2021-07-07 RX ORDER — MECLIZINE HYDROCHLORIDE 25 MG/1
TABLET ORAL
Qty: 60 TABLET | Refills: 2 | Status: SHIPPED | OUTPATIENT
Start: 2021-07-07 | End: 2021-10-01

## 2021-07-30 RX ORDER — FLUOXETINE HYDROCHLORIDE 20 MG/1
CAPSULE ORAL
Qty: 60 CAPSULE | Refills: 2 | Status: SHIPPED | OUTPATIENT
Start: 2021-07-30 | End: 2021-11-01

## 2021-10-01 RX ORDER — MECLIZINE HYDROCHLORIDE 25 MG/1
TABLET ORAL
Qty: 60 TABLET | Refills: 0 | Status: SHIPPED | OUTPATIENT
Start: 2021-10-01 | End: 2021-10-26

## 2021-10-20 ENCOUNTER — LAB (OUTPATIENT)
Dept: LAB | Facility: HOSPITAL | Age: 81
End: 2021-10-20

## 2021-10-20 ENCOUNTER — OFFICE VISIT (OUTPATIENT)
Dept: FAMILY MEDICINE CLINIC | Age: 81
End: 2021-10-20

## 2021-10-20 VITALS
SYSTOLIC BLOOD PRESSURE: 135 MMHG | HEIGHT: 63 IN | BODY MASS INDEX: 37.39 KG/M2 | WEIGHT: 211 LBS | DIASTOLIC BLOOD PRESSURE: 65 MMHG | HEART RATE: 76 BPM

## 2021-10-20 DIAGNOSIS — B35.1 ONYCHOMYCOSIS: ICD-10-CM

## 2021-10-20 DIAGNOSIS — I10 ESSENTIAL HYPERTENSION: ICD-10-CM

## 2021-10-20 DIAGNOSIS — R60.0 BILATERAL LEG EDEMA: ICD-10-CM

## 2021-10-20 DIAGNOSIS — F32.4 MAJOR DEPRESSIVE DISORDER WITH SINGLE EPISODE, IN PARTIAL REMISSION (HCC): ICD-10-CM

## 2021-10-20 DIAGNOSIS — H91.93 BILATERAL HEARING LOSS, UNSPECIFIED HEARING LOSS TYPE: ICD-10-CM

## 2021-10-20 DIAGNOSIS — H93.13 TINNITUS AURIUM, BILATERAL: ICD-10-CM

## 2021-10-20 DIAGNOSIS — G25.81 RESTLESS LEGS SYNDROME (RLS): ICD-10-CM

## 2021-10-20 DIAGNOSIS — R06.81 APNEA: ICD-10-CM

## 2021-10-20 DIAGNOSIS — K59.00 CONSTIPATION, UNSPECIFIED CONSTIPATION TYPE: ICD-10-CM

## 2021-10-20 DIAGNOSIS — I10 ESSENTIAL HYPERTENSION: Primary | ICD-10-CM

## 2021-10-20 DIAGNOSIS — K21.9 GASTROESOPHAGEAL REFLUX DISEASE WITHOUT ESOPHAGITIS: ICD-10-CM

## 2021-10-20 PROBLEM — M81.0 OSTEOPOROSIS: Status: ACTIVE | Noted: 2021-10-20

## 2021-10-20 PROBLEM — G47.30 SLEEP APNEA: Status: ACTIVE | Noted: 2021-10-20

## 2021-10-20 PROBLEM — G43.909 MIGRAINES: Status: ACTIVE | Noted: 2021-10-20

## 2021-10-20 PROBLEM — F32.A DEPRESSION: Status: ACTIVE | Noted: 2021-10-20

## 2021-10-20 LAB
ALBUMIN SERPL-MCNC: 4.3 G/DL (ref 3.5–5.2)
ALBUMIN/GLOB SERPL: 1.4 G/DL
ALP SERPL-CCNC: 95 U/L (ref 39–117)
ALT SERPL W P-5'-P-CCNC: 9 U/L (ref 1–33)
ANION GAP SERPL CALCULATED.3IONS-SCNC: 9.6 MMOL/L (ref 5–15)
AST SERPL-CCNC: 18 U/L (ref 1–32)
BILIRUB SERPL-MCNC: 0.3 MG/DL (ref 0–1.2)
BUN SERPL-MCNC: 15 MG/DL (ref 8–23)
BUN/CREAT SERPL: 17 (ref 7–25)
CALCIUM SPEC-SCNC: 9.9 MG/DL (ref 8.6–10.5)
CHLORIDE SERPL-SCNC: 100 MMOL/L (ref 98–107)
CO2 SERPL-SCNC: 26.4 MMOL/L (ref 22–29)
CREAT SERPL-MCNC: 0.88 MG/DL (ref 0.57–1)
GFR SERPL CREATININE-BSD FRML MDRD: 62 ML/MIN/1.73
GLOBULIN UR ELPH-MCNC: 3 GM/DL
GLUCOSE SERPL-MCNC: 97 MG/DL (ref 65–99)
POTASSIUM SERPL-SCNC: 4.4 MMOL/L (ref 3.5–5.2)
PROT SERPL-MCNC: 7.3 G/DL (ref 6–8.5)
SODIUM SERPL-SCNC: 136 MMOL/L (ref 136–145)

## 2021-10-20 PROCEDURE — 36415 COLL VENOUS BLD VENIPUNCTURE: CPT

## 2021-10-20 PROCEDURE — 99214 OFFICE O/P EST MOD 30 MIN: CPT | Performed by: FAMILY MEDICINE

## 2021-10-20 PROCEDURE — 80053 COMPREHEN METABOLIC PANEL: CPT

## 2021-10-20 RX ORDER — SPIRONOLACTONE 50 MG/1
50 TABLET, FILM COATED ORAL DAILY
Qty: 90 TABLET | Refills: 1 | Status: SHIPPED | OUTPATIENT
Start: 2021-10-20 | End: 2022-04-28

## 2021-10-20 NOTE — PROGRESS NOTES
Yolanda Sharp presents to [facility].    Chief Complaint: Lower extremity swelling    Subjective       History of Present Illness:  HPI   Patient to be evaluated for hypertension.  Her current cardiac medication regimen includes a diuretic spironolactone.  Ms. Sharp does not check her blood pressure other than at her clinic appointments.  She is tolerating the medication well without side effects.  Compliance with treatment has been good; she takes her medication as directed, maintains her diet and exercise regimen, and follows up as directed.      Yolanda is concerned about lower extremity swelling and she has been on spironolactone for this. She is not able to get up and walk very much and she uses a wheelchair so she has a lot of dependent edema. In addition she is concerned about her sleep apnea. She states it is worse and she has not been checked in a long time for it. She is not on a CPAP. Patient needs referral for further work-up for sleep apnea. She has chronic restless leg syndrome which is stable on her carbidopa levodopa at nighttime and she is doing well on this medication. She has chronic constipation is on three stool softeners at night. I have advised that she take 1 in the morning and 2 at night. Her depression is very well controlled on Prozac and she takes this daily. She has a fingernail she would like me to look at on her left thumbnail that she states has a growth underneath it that onset after she had a traumatic injury. In addition she has chronic tinnitus with bilateral hearing loss and would like further work-up for this. Chronic GERD is stable on Pepcid        Review of Systems:  Review of Systems   Constitutional: Negative for chills and fever.   HENT: Negative for ear pain, sinus pressure and sore throat.    Eyes: Negative for blurred vision and double vision.   Respiratory: Negative for cough, shortness of breath and wheezing.    Cardiovascular: Positive  for leg swelling. Negative for chest pain and palpitations.   Gastrointestinal: Positive for constipation and GERD. Negative for abdominal pain, blood in stool, diarrhea, nausea and vomiting.   Skin: Negative for rash.   Neurological: Positive for headache. Negative for dizziness.   Psychiatric/Behavioral: Negative for depressed mood.        Objective   Medical History:  No past medical history on file.  No past surgical history on file.   History reviewed. No pertinent family history.  Social History     Tobacco Use   • Smoking status: Never Smoker   • Smokeless tobacco: Never Used   Substance Use Topics   • Alcohol use: Never       Health Maintenance Due   Topic Date Due   • ANNUAL PHYSICAL  Never done   • TDAP/TD VACCINES (1 - Tdap) Never done   • ZOSTER VACCINE (1 of 2) Never done   • Pneumococcal Vaccine 65+ (1 of 1 - PPSV23) Never done   • DXA SCAN  08/02/2019   • INFLUENZA VACCINE  08/01/2021   • LIPID PANEL  10/20/2021        Immunization History   Administered Date(s) Administered   • COVID-19 (PFIZER) 01/30/2021, 02/21/2021, 10/08/2021       Allergies   Allergen Reactions   • Quibron-T [Theophylline] Other (See Comments)        Medications:  Current Outpatient Medications on File Prior to Visit   Medication Sig   • Apoaequorin (PREVAGEN EXTRA STRENGTH PO) Take  by mouth Daily.   • Calcium-Vitamin D-Vitamin K 500-100-40 MG-UNT-MCG chewable tablet Calcium for Women 500-100-40 mg-unit-mcg oral tablet,chewable chew 2 tablets by oral route daily   Active   • carbidopa-levodopa (SINEMET)  MG per tablet TAKE ONE TABLET BY MOUTH EVERY NIGHT AT BEDTIME   • docusate sodium (COLACE) 100 MG capsule Take 100 mg by mouth 2 (Two) Times a Day.   • famotidine (PEPCID) 40 MG tablet Take 40 mg by mouth 2 (Two) Times a Day.   • FLUoxetine (PROzac) 20 MG capsule TAKE ONE CAPSULE BY MOUTH TWICE DAILY   • melatonin 3 MG tablet melatonin 3 mg oral tablet take 1 tablet by oral route daily   Active   • multivitamin with  "minerals (MULTIVITAMIN ADULT PO) multivitamin oral capsule take 1 capsule by oral route daily   Active   • [DISCONTINUED] spironolactone (ALDACTONE) 25 MG tablet Take 1 tablet by mouth Daily.   • meclizine (ANTIVERT) 25 MG tablet TAKE ONE TABLET BY MOUTH TWICE DAILY   • ondansetron ODT (Zofran ODT) 4 MG disintegrating tablet 1 tablet Every 6 (Six) Hours As Needed.   • [DISCONTINUED] diclofenac (VOLTAREN) 75 MG EC tablet Take 75 mg by mouth Daily.     No current facility-administered medications on file prior to visit.       Vital Signs:   /65 (BP Location: Right arm, Patient Position: Sitting, Cuff Size: Large Adult)   Pulse 76   Ht 159.4 cm (62.75\")   Wt 95.7 kg (211 lb)   BMI 37.68 kg/m²       Physical Exam:  Physical Exam  Vitals and nursing note reviewed.   Constitutional:       General: She is not in acute distress.     Appearance: Normal appearance. She is obese. She is not ill-appearing, toxic-appearing or diaphoretic.   HENT:      Head: Normocephalic and atraumatic.      Right Ear: Tympanic membrane, ear canal and external ear normal.      Left Ear: Tympanic membrane, ear canal and external ear normal.      Nose: No congestion or rhinorrhea.      Mouth/Throat:      Mouth: Mucous membranes are moist.      Pharynx: Oropharynx is clear. No oropharyngeal exudate or posterior oropharyngeal erythema.   Eyes:      Extraocular Movements: Extraocular movements intact.      Conjunctiva/sclera: Conjunctivae normal.      Pupils: Pupils are equal, round, and reactive to light.   Cardiovascular:      Rate and Rhythm: Normal rate and regular rhythm.      Heart sounds: Normal heart sounds.   Pulmonary:      Effort: Pulmonary effort is normal.      Breath sounds: Normal breath sounds. No wheezing, rhonchi or rales.   Abdominal:      General: Abdomen is flat.      Palpations: Abdomen is soft.   Musculoskeletal:      Cervical back: Neck supple.   Lymphadenopathy:      Cervical: No cervical adenopathy.   Skin:     " General: Skin is warm and dry.          Neurological:      Mental Status: She is alert and oriented to person, place, and time.      Gait: Gait abnormal.      Comments: She uses a Hoveround   Psychiatric:         Mood and Affect: Mood normal.         Behavior: Behavior normal.         Result Review      The following data was reviewed by Kathleen Brown MD on 10/20/2021.  Lab Results   Component Value Date    WBC 7.41 05/19/2021    HGB 12.40 05/19/2021    HCT 38.4 05/19/2021    MCV 86.9 05/19/2021    .00 05/19/2021     Lab Results   Component Value Date    BUN 12 05/19/2021    CREATININE 0.84 05/19/2021    BCR 14 05/19/2021    K 4.0 05/19/2021    CO2 24 05/19/2021    CALCIUM 9.4 05/19/2021    ALBUMIN 3.7 05/19/2021    LABIL2 1.2 (L) 05/19/2021    AST 18 05/19/2021    ALT 7 (L) 05/19/2021     Lab Results   Component Value Date    CHLPL 184 10/29/2019    TRIG 102 10/29/2019    HDL 53 10/29/2019     (H) 10/29/2019     Lab Results   Component Value Date    TSH 3.230 05/19/2021     Lab Results   Component Value Date    HGBA1C 5.6 05/19/2021     No results found for: PSA                    Assessment and Plan:          Diagnoses and all orders for this visit:    1. Essential hypertension (Primary)  -     Comprehensive metabolic panel; Future  -     spironolactone (ALDACTONE) 50 MG tablet; Take 1 tablet by mouth Daily.  Dispense: 90 tablet; Refill: 1    2. Major depressive disorder with single episode, in partial remission (HCC)  Comments:  Very well controlled on Prozac    3. Gastroesophageal reflux disease without esophagitis  Comments:  Stable on famotidine    4. Constipation, unspecified constipation type  Comments:  Change her stool softeners to 1 in the morning and 2 at night    5. Bilateral leg edema  Comments:  Increase spironolactone to 50 mg daily and check a potassium on her today    6. Restless legs syndrome (RLS)  Comments:  Stable on carbidopa and levodopa    7. Apnea  Comments:  Referral to  the sleep clinic for sleep study  Orders:  -     Cancel: Ambulatory Referral to Sleep Medicine  -     Ambulatory Referral to Sleep Medicine    8. Tinnitus aurium, bilateral  Comments:  Referral to ENT  Orders:  -     Cancel: Ambulatory Referral to ENT (Otolaryngology)  -     Ambulatory Referral to ENT (Otolaryngology)    9. Onychomycosis  Comments:  Referral to Derm for biopsy of the thumbnail  Orders:  -     Cancel: Ambulatory Referral to Dermatology  -     Ambulatory Referral to Dermatology    10. Bilateral hearing loss, unspecified hearing loss type  Comments:  Referral to ENT for hearing test  Orders:  -     Cancel: Ambulatory Referral to ENT (Otolaryngology)  -     Ambulatory Referral to ENT (Otolaryngology)          Follow Up   Return in about 6 months (around 4/28/2022) for Medicare Wellness.

## 2021-10-26 RX ORDER — MECLIZINE HYDROCHLORIDE 25 MG/1
TABLET ORAL
Qty: 60 TABLET | Refills: 0 | Status: SHIPPED | OUTPATIENT
Start: 2021-10-26 | End: 2021-11-30 | Stop reason: SDUPTHER

## 2021-11-01 RX ORDER — FAMOTIDINE 40 MG/1
TABLET, FILM COATED ORAL
Qty: 180 TABLET | Refills: 1 | Status: SHIPPED | OUTPATIENT
Start: 2021-11-01 | End: 2022-04-28

## 2021-11-01 RX ORDER — FLUOXETINE HYDROCHLORIDE 20 MG/1
CAPSULE ORAL
Qty: 60 CAPSULE | Refills: 1 | Status: SHIPPED | OUTPATIENT
Start: 2021-11-01 | End: 2022-01-04

## 2021-11-09 ENCOUNTER — OFFICE VISIT (OUTPATIENT)
Dept: OTOLARYNGOLOGY | Facility: CLINIC | Age: 81
End: 2021-11-09

## 2021-11-09 ENCOUNTER — PROCEDURE VISIT (OUTPATIENT)
Dept: OTOLARYNGOLOGY | Facility: CLINIC | Age: 81
End: 2021-11-09

## 2021-11-09 VITALS — TEMPERATURE: 97.8 F | BODY MASS INDEX: 35.44 KG/M2 | WEIGHT: 200 LBS | HEIGHT: 63 IN

## 2021-11-09 DIAGNOSIS — H90.3 SENSORY HEARING LOSS, BILATERAL: ICD-10-CM

## 2021-11-09 DIAGNOSIS — H93.13 TINNITUS OF BOTH EARS: ICD-10-CM

## 2021-11-09 DIAGNOSIS — H93.13 TINNITUS OF BOTH EARS: Primary | ICD-10-CM

## 2021-11-09 DIAGNOSIS — H90.3 SENSORINEURAL HEARING LOSS (SNHL) OF BOTH EARS: ICD-10-CM

## 2021-11-09 PROCEDURE — 92567 TYMPANOMETRY: CPT | Performed by: AUDIOLOGIST

## 2021-11-09 PROCEDURE — 99203 OFFICE O/P NEW LOW 30 MIN: CPT | Performed by: OTOLARYNGOLOGY

## 2021-11-09 PROCEDURE — 92557 COMPREHENSIVE HEARING TEST: CPT | Performed by: AUDIOLOGIST

## 2021-11-09 RX ORDER — OMEPRAZOLE 40 MG/1
40 CAPSULE, DELAYED RELEASE ORAL DAILY
COMMUNITY
End: 2022-12-19

## 2021-11-09 RX ORDER — DIPHENHYDRAMINE HCL 25 MG
25 CAPSULE ORAL EVERY 6 HOURS PRN
COMMUNITY

## 2021-11-09 RX ORDER — GABAPENTIN 100 MG/1
100 CAPSULE ORAL 3 TIMES DAILY
COMMUNITY
End: 2022-04-04

## 2021-11-09 NOTE — PROGRESS NOTES
"Patient Name: Yolanda Sharp   Visit Date: 11/09/2021   Patient ID: [id number]  Provider: Dedrick Greenwood MD    Sex: female  Location: [facility]   YOB: 1940  Location Address: [address] [place] [address], [address] [place] [address],?[id number]    Primary Care Provider Kathleen Brown MD  Location Phone: [phone]    Referring Provider: Kathleen Brown MD        Chief Complaint  New patient (Dr. Kathleen Brown referred), Tinnitus (static sounds in bilateral ears-present for 9 months), Hearing Loss, sinus pressure, and Earache (right ear pain occasionally, but left ear constantly)    Subjective    History of Present Illness  Yolanda Sharp is a 80 y.o. female who presents to [facility] today as a consult from Kathleen Brown MD.    She presents the clinic today for evaluation of gradual hearing loss, and static-like tinnitus which is louder in the left ear.  She noticed the static-like sound in the ears several months ago, notes that it seems to be getting worse.  She denies any significant history of ear disease or loud noise exposure.  She has had some discomfort on the left side which comes and goes.  She denies any pain with chewing.  Notes that the pain is mild right now, but sometimes is \"a 12 out of 10.\"  She has not had a hearing test in quite some time.    Past Medical History:   Diagnosis Date   • Arthritis    • Back problem    • Bleeding    • Bronchitis    • Cancer (HCC)    • Headache    • Hernia, hiatal    • HL (hearing loss)    • Hypertension    • Osteoporosis    • Sleep apnea    • Tinnitus        Past Surgical History:   Procedure Laterality Date   • EYE SURGERY Bilateral 2005   • GALLBLADDER SURGERY     • HAND SURGERY  1977   • HERNIA REPAIR     • KNEE SURGERY  1983   • OOPHORECTOMY Left 1987         Current Outpatient Medications:   •  Apoaequorin (PREVAGEN EXTRA STRENGTH PO), Take  by mouth Daily., Disp: , Rfl:   •  Calcium " Carbonate-Vitamin D (calcium-vitamin D) 500-200 MG-UNIT tablet per tablet, Take 2 tablets by mouth Daily., Disp: , Rfl:   •  Calcium-Vitamin D-Vitamin K 500-100-40 MG-UNT-MCG chewable tablet, Calcium for Women 500-100-40 mg-unit-mcg oral tablet,chewable chew 2 tablets by oral route daily   Active, Disp: , Rfl:   •  carbidopa-levodopa (SINEMET)  MG per tablet, TAKE ONE TABLET BY MOUTH EVERY NIGHT AT BEDTIME, Disp: 90 tablet, Rfl: 0  •  diphenhydrAMINE (BENADRYL) 25 mg capsule, Take 25 mg by mouth Every 6 (Six) Hours As Needed for Itching., Disp: , Rfl:   •  docusate sodium (COLACE) 100 MG capsule, Take 100 mg by mouth 2 (Two) Times a Day., Disp: , Rfl:   •  famotidine (PEPCID) 40 MG tablet, TAKE ONE TABLET BY MOUTH TWICE DAILY, Disp: 180 tablet, Rfl: 1  •  FLUoxetine (PROzac) 20 MG capsule, TAKE ONE CAPSULE BY MOUTH TWICE DAILY, Disp: 60 capsule, Rfl: 1  •  meclizine (ANTIVERT) 25 MG tablet, TAKE ONE TABLET BY MOUTH TWICE DAILY, Disp: 60 tablet, Rfl: 0  •  melatonin 3 MG tablet, melatonin 3 mg oral tablet take 1 tablet by oral route daily   Active, Disp: , Rfl:   •  multivitamin with minerals (MULTIVITAMIN ADULT PO), multivitamin oral capsule take 1 capsule by oral route daily   Active, Disp: , Rfl:   •  omeprazole (priLOSEC) 40 MG capsule, Take 40 mg by mouth Daily., Disp: , Rfl:   •  spironolactone (ALDACTONE) 50 MG tablet, Take 1 tablet by mouth Daily., Disp: 90 tablet, Rfl: 1  •  gabapentin (NEURONTIN) 100 MG capsule, Take 100 mg by mouth 3 (Three) Times a Day., Disp: , Rfl:   •  ondansetron ODT (Zofran ODT) 4 MG disintegrating tablet, 1 tablet Every 6 (Six) Hours As Needed., Disp: , Rfl:      Allergies   Allergen Reactions   • Theophylline Other (See Comments) and Shortness Of Breath       Family History   Problem Relation Age of Onset   • Arthritis Mother    • Hypertension Mother    • Diabetes Father    • Arthritis Sister    • Cancer Sister    • Heart failure Sister    • Hypertension Sister    • Arthritis  "Brother    • Cancer Brother    • Heart failure Brother    • Hypertension Brother    • Kidney disease Brother         Social History     Social History Narrative   • Not on file       Objective     Vital Signs:   Temp 97.8 °F (36.6 °C) (Temporal)   Ht 159.4 cm (62.76\")   Wt 90.7 kg (200 lb)   BMI 35.70 kg/m²       Physical Exam         Constitutional   Appearance  · : well developed, well-nourished, alert and in no acute distress, voice clear and strong    Head  Inspection  · : no deformities or lesions  Face  Inspection  · : No facial lesions; House-Brackmann I/VI bilaterally  Palpation  · : No TMJ crepitus nor  muscle tenderness bilaterally    Eyes  Vision  Visual Fields  · : Extraocular movements are intact. No spontaneous or gaze-induced nystagmus.  Conjunctivae  · : clear  Sclerae  · : clear  Pupils and Irises  · : pupils equal, round, and reactive to light.     Ears, Nose, Mouth and Throat    Ears    External Ears  · : appearance within normal limits, no lesions present  Otoscopic Examination  · : Tympanic membrane appearance within normal limits bilaterally without perforations, well-aerated middle ears  Hearing  · : intact to conversational voice both ears  Tunning fork testing:     :    Nose    External Nose  · : appearance normal  Intranasal Exam  · : mucosa within normal limits, vestibules normal, no intranasal lesions present, septum midline, sinuses non tender to percussion  Oral Cavity    Oral Mucosa  · : oral mucosa normal without pallor or cyanosis  Lips  · : lip appearance normal  Teeth  · : normal dentition for age  Gums  · : gums pink, non-swollen, no bleeding present  Tongue  · : tongue appearance normal; normal mobility  Palate  · : hard palate normal, soft palate appearance normal with symmetric mobility    Throat    Oropharynx  · : no inflammation or lesions present, tonsils within normal limits  Hypopharynx  · : appearance within normal limits, superior epiglottis within normal " limits  Larynx  · : appearance within normal limits, vocal cords within normal limits, no lesions present    Neck  Inspection/Palpation  · : normal appearance, no masses or tenderness, trachea midline; thyroid size normal, nontender, no nodules or masses present on palpation    Respiratory  Respiratory Effort  · : breathing unlabored  Inspection of Chest  · : normal appearance, no retractions    Cardiovascular  Heart  · : regular rate and rhythm    Lymphatic  Neck  · : no lymphadenopathy present  Supraclavicular Nodes  · : no lymphadenopathy present  Preauricular Nodes  · : no lymphadenopathy present    Skin and Subcutaneous Tissue  General Inspection  · : Regarding face and neck - there are no rashes present, no lesions present, and no areas of discoloration    Neurologic  Cranial Nerves  · : cranial nerves II-XII are grossly intact bilaterally  Gait and Station  · : normal gait, able to stand without diffculty    Psychiatric  Judgement and Insight  · : judgment and insight intact  Mood and Affect  · : mood normal, affect appropriate          Assessment and Plan    Diagnoses and all orders for this visit:    1. Tinnitus of both ears (Primary)  -     Comprehensive Hearing Test; Future  -     Tympanometry; Future    2. Sensorineural hearing loss (SNHL) of both ears  -     Comprehensive Hearing Test; Future  -     Tympanometry; Future    Examination today revealed no tenderness of the TMJ.  Ear exam was essentially normal.  I did obtain an audiogram and tympanogram and this shows normal tympanograms, and mild to severe sensorineural hearing loss bilaterally.  I think this is likely the cause of her tinnitus.  I discussed background noise distraction and melatonin use nightly for this.  I will make her an appointment with my audiologist to discuss hearing aids that she does have difficulty hearing people in day-to-day situations.  I think her ear discomfort is likely related to TMJ, and I discussed some treatment  options for this. If there are any progressive symptoms I will have her contact me.    Follow Up   No follow-ups on file.  Patient was given instructions and counseling regarding her condition or for health maintenance advice. Please see specific information pulled into the AVS if appropriate.

## 2021-11-30 RX ORDER — MECLIZINE HYDROCHLORIDE 25 MG/1
25 TABLET ORAL 2 TIMES DAILY
Qty: 60 TABLET | Refills: 1 | Status: SHIPPED | OUTPATIENT
Start: 2021-11-30 | End: 2022-10-11

## 2022-01-04 RX ORDER — FLUOXETINE HYDROCHLORIDE 20 MG/1
CAPSULE ORAL
Qty: 60 CAPSULE | Refills: 1 | Status: SHIPPED | OUTPATIENT
Start: 2022-01-04 | End: 2022-03-02

## 2022-02-02 RX ORDER — ONDANSETRON 4 MG/1
TABLET, FILM COATED ORAL
Qty: 20 TABLET | Refills: 0 | Status: SHIPPED | OUTPATIENT
Start: 2022-02-02 | End: 2022-04-04 | Stop reason: SDUPTHER

## 2022-03-02 RX ORDER — FLUOXETINE HYDROCHLORIDE 20 MG/1
CAPSULE ORAL
Qty: 60 CAPSULE | Refills: 1 | Status: SHIPPED | OUTPATIENT
Start: 2022-03-02 | End: 2022-04-28 | Stop reason: SDUPTHER

## 2022-04-04 ENCOUNTER — OFFICE VISIT (OUTPATIENT)
Dept: FAMILY MEDICINE CLINIC | Age: 82
End: 2022-04-04

## 2022-04-04 VITALS
TEMPERATURE: 97.9 F | HEIGHT: 63 IN | HEART RATE: 77 BPM | OXYGEN SATURATION: 95 % | DIASTOLIC BLOOD PRESSURE: 64 MMHG | SYSTOLIC BLOOD PRESSURE: 122 MMHG | BODY MASS INDEX: 38.09 KG/M2 | WEIGHT: 215 LBS

## 2022-04-04 DIAGNOSIS — R60.0 BILATERAL LEG EDEMA: ICD-10-CM

## 2022-04-04 DIAGNOSIS — Z96.1 HISTORY OF INTRAOCULAR LENS IMPLANT: ICD-10-CM

## 2022-04-04 DIAGNOSIS — I10 ESSENTIAL HYPERTENSION: ICD-10-CM

## 2022-04-04 DIAGNOSIS — K21.9 GASTROESOPHAGEAL REFLUX DISEASE WITHOUT ESOPHAGITIS: ICD-10-CM

## 2022-04-04 DIAGNOSIS — N90.4 LICHEN SCLEROSUS OF FEMALE GENITALIA: ICD-10-CM

## 2022-04-04 DIAGNOSIS — Z78.0 POSTMENOPAUSAL STATE: ICD-10-CM

## 2022-04-04 DIAGNOSIS — M54.42 CHRONIC BILATERAL LOW BACK PAIN WITH BILATERAL SCIATICA: ICD-10-CM

## 2022-04-04 DIAGNOSIS — G25.81 RESTLESS LEGS SYNDROME (RLS): ICD-10-CM

## 2022-04-04 DIAGNOSIS — Z12.31 ENCOUNTER FOR SCREENING MAMMOGRAM FOR BREAST CANCER: ICD-10-CM

## 2022-04-04 DIAGNOSIS — H53.8 BLURRED VISION, BILATERAL: ICD-10-CM

## 2022-04-04 DIAGNOSIS — H91.93 BILATERAL HEARING LOSS, UNSPECIFIED HEARING LOSS TYPE: ICD-10-CM

## 2022-04-04 DIAGNOSIS — Z00.00 ENCOUNTER FOR ANNUAL WELLNESS EXAM IN MEDICARE PATIENT: Primary | ICD-10-CM

## 2022-04-04 DIAGNOSIS — Z12.11 COLON CANCER SCREENING: ICD-10-CM

## 2022-04-04 DIAGNOSIS — G89.29 CHRONIC BILATERAL LOW BACK PAIN WITH BILATERAL SCIATICA: ICD-10-CM

## 2022-04-04 DIAGNOSIS — K59.00 CONSTIPATION, UNSPECIFIED CONSTIPATION TYPE: ICD-10-CM

## 2022-04-04 DIAGNOSIS — M54.41 CHRONIC BILATERAL LOW BACK PAIN WITH BILATERAL SCIATICA: ICD-10-CM

## 2022-04-04 PROCEDURE — G0439 PPPS, SUBSEQ VISIT: HCPCS | Performed by: FAMILY MEDICINE

## 2022-04-04 PROCEDURE — 99214 OFFICE O/P EST MOD 30 MIN: CPT | Performed by: FAMILY MEDICINE

## 2022-04-04 PROCEDURE — 1170F FXNL STATUS ASSESSED: CPT | Performed by: FAMILY MEDICINE

## 2022-04-04 PROCEDURE — 1159F MED LIST DOCD IN RCRD: CPT | Performed by: FAMILY MEDICINE

## 2022-04-04 RX ORDER — CLOBETASOL PROPIONATE 0.5 MG/G
1 CREAM TOPICAL 2 TIMES DAILY
Qty: 15 G | Refills: 0 | Status: SHIPPED | OUTPATIENT
Start: 2022-04-04 | End: 2022-10-11

## 2022-04-04 RX ORDER — METHYLPREDNISOLONE 4 MG/1
TABLET ORAL
Qty: 1 EACH | Refills: 0 | Status: SHIPPED | OUTPATIENT
Start: 2022-04-04 | End: 2022-10-11

## 2022-04-04 NOTE — ASSESSMENT & PLAN NOTE
Pt BP is well controlled on current medication, tolerates med well, recommend monitoring BP outside of office, due for labs

## 2022-04-04 NOTE — PROGRESS NOTES
The ABCs of the Annual Wellness Visit  Subsequent Medicare Wellness Visit    Chief Complaint   Patient presents with   • Back Pain     Ongoing for a week   • Hip Pain   • Medicare Wellness-subsequent      Subjective    History of Present Illness:  Yolanda Sharp is a 81 y.o. female who presents for a Subsequent Medicare Wellness Visit.    The following portions of the patient's history were reviewed and   updated as appropriate: allergies, current medications, past family history, past medical history, past social history, past surgical history and problem list.    Compared to one year ago, the patient feels her physical   health is worse.    Compared to one year ago, the patient feels her mental   health is the same.    Recent Hospitalizations:  She was not admitted to the hospital during the last year.      Acute onset of worsening pain, gladys in lower back.  She is asking to take prednisone 20 mg daily and to be able to take them prn at her discretion and she promises that she will not take them very often. I informed her I am not comfortortable with that.  She has had chronic back pain for years, tried and failed pain management with steroid shots in spine and PT.  She also has more swelling in her LE and she is on spironolactone    She is also having more allergy symptoms with runny nose, worse this past month with symptoms.  No sinus pain but she has headaches and feels drained and tired.      Patient to be evaluated for hypertension.  Her current cardiac medication regimen includes a diuretic spironolactone.  Ms. Sharp does not check her blood pressure other than at her clinic appointments.  She is tolerating the medication well without side effects.  Compliance with treatment has been good; she takes her medication as directed, maintains her diet and exercise regimen, and follows up as directed.       She is concerned about her sleep apnea.  Patient needs referral for further work-up for sleep apnea.      She has chronic restless leg syndrome which is stable on her carbidopa levodopa at nighttime and she is doing well on this medication.     She has chronic constipation and she is doing well on pro10 probiotics with meals    Her depression is very well controlled on Prozac and she takes this daily    Chronic GERD is stable on Pepcid and omeprazole             Current Medical Providers:  Patient Care Team:  Kathleen Brown MD as PCP - General (Family Medicine)  Augusto Bar MD as Consulting Physician (Sleep Medicine)  Dedrick Greenwood MD as Consulting Physician (Otolaryngology)  Raulito Carter MD as Consulting Physician (Pulmonary Disease)    Outpatient Medications Prior to Visit   Medication Sig Dispense Refill   • Apoaequorin (PREVAGEN EXTRA STRENGTH PO) Take  by mouth Daily.     • Calcium Carbonate-Vitamin D (calcium-vitamin D) 500-200 MG-UNIT tablet per tablet Take 2 tablets by mouth Daily.     • Calcium-Vitamin D-Vitamin K 500-100-40 MG-UNT-MCG chewable tablet Calcium for Women 500-100-40 mg-unit-mcg oral tablet,chewable chew 2 tablets by oral route daily   Active     • carbidopa-levodopa (SINEMET)  MG per tablet TAKE ONE TABLET BY MOUTH EVERY NIGHT AT BEDTIME 90 tablet 1   • diphenhydrAMINE (BENADRYL) 25 mg capsule Take 25 mg by mouth Every 6 (Six) Hours As Needed for Itching.     • famotidine (PEPCID) 40 MG tablet TAKE ONE TABLET BY MOUTH TWICE DAILY 180 tablet 1   • FLUoxetine (PROzac) 20 MG capsule TAKE ONE CAPSULE BY MOUTH TWICE DAILY 60 capsule 1   • meclizine (ANTIVERT) 25 MG tablet Take 1 tablet by mouth 2 (Two) Times a Day. 60 tablet 1   • melatonin 3 MG tablet melatonin 3 mg oral tablet take 1 tablet by oral route daily   Active     • multivitamin with minerals tablet tablet multivitamin oral capsule take 1 capsule by oral route daily   Active     • omeprazole (priLOSEC) 40 MG capsule Take 40 mg by mouth Daily.     • ondansetron ODT (ZOFRAN-ODT) 4 MG disintegrating tablet  1 tablet Every 6 (Six) Hours As Needed.     • spironolactone (ALDACTONE) 50 MG tablet Take 1 tablet by mouth Daily. 90 tablet 1   • docusate sodium (COLACE) 100 MG capsule Take 100 mg by mouth 2 (Two) Times a Day.     • ondansetron (ZOFRAN) 4 MG tablet TAKE 1 TABLET BY MOUTH EVERY SIX HOURS AS NEEDED FOR NAUSEA AND VOMITING 20 tablet 0   • gabapentin (NEURONTIN) 100 MG capsule Take 100 mg by mouth 3 (Three) Times a Day.       No facility-administered medications prior to visit.       No opioid medication identified on active medication list. I have reviewed chart for other potential  high risk medication/s and harmful drug interactions in the elderly.          Aspirin is not on active medication list.  Aspirin use is not indicated based on review of current medical condition/s. Risk of harm outweighs potential benefits.  .    Patient Active Problem List   Diagnosis   • Migraines   • Osteoporosis   • Sleep apnea   • Depression   • Hypertension   • Arthritis of left hip     Advance Care Planning  Advance Directive is not on file.  ACP discussion was held with the patient during this visit. Patient has an advance directive (not in EMR), copy requested.    Review of Systems   Constitutional: Negative for chills, fatigue and fever.   HENT: Negative for ear pain, sinus pressure and sore throat.    Respiratory: Negative for cough, shortness of breath and wheezing.    Cardiovascular: Positive for leg swelling. Negative for chest pain and palpitations.   Gastrointestinal: Negative for abdominal pain, blood in stool, constipation, diarrhea, nausea and vomiting.   Genitourinary: Negative for dysuria.   Skin: Negative for rash.   Neurological: Negative for dizziness.   Psychiatric/Behavioral: Negative for sleep disturbance and suicidal ideas.        Objective    Vitals:    04/04/22 1424   BP: 122/64   BP Location: Right arm   Patient Position: Sitting   Cuff Size: Large Adult   Pulse: 77   Temp: 97.9 °F (36.6 °C)   TempSrc:  "Oral   SpO2: 95%   Weight: 97.5 kg (215 lb)   Height: 159.4 cm (62.76\")     BMI Readings from Last 1 Encounters:   22 38.38 kg/m²   BMI is above normal parameters. Recommendations include: nutrition counseling    Does the patient have evidence of cognitive impairment? No    Physical Exam  Vitals and nursing note reviewed.   Constitutional:       General: She is not in acute distress.     Appearance: Normal appearance. She is not ill-appearing, toxic-appearing or diaphoretic.   HENT:      Head: Normocephalic and atraumatic.      Right Ear: Tympanic membrane, ear canal and external ear normal.      Left Ear: Tympanic membrane, ear canal and external ear normal.      Nose: Nose normal. No congestion or rhinorrhea.      Mouth/Throat:      Pharynx: Oropharynx is clear. No oropharyngeal exudate or posterior oropharyngeal erythema.   Eyes:      Conjunctiva/sclera: Conjunctivae normal.   Cardiovascular:      Rate and Rhythm: Normal rate.      Pulses: Normal pulses.   Pulmonary:      Effort: Pulmonary effort is normal. No respiratory distress.      Breath sounds: Normal breath sounds. No stridor. No wheezing, rhonchi or rales.   Chest:   Breasts:      Right: Normal.      Left: Normal.       Genitourinary:      Musculoskeletal:         General: Normal range of motion.      Cervical back: Normal range of motion and neck supple. No rigidity.   Lymphadenopathy:      Cervical: No cervical adenopathy.   Skin:     General: Skin is warm and dry.      Capillary Refill: Capillary refill takes less than 2 seconds.   Neurological:      Mental Status: She is alert and oriented to person, place, and time.   Psychiatric:         Mood and Affect: Mood normal.         Behavior: Behavior normal.                 HEALTH RISK ASSESSMENT    Smoking Status:  Social History     Tobacco Use   Smoking Status Former Smoker   • Packs/day: 1.00   • Years: 33.00   • Pack years: 33.00   • Quit date:    • Years since quittin.2   Smokeless " Tobacco Never Used     Alcohol Consumption:  Social History     Substance and Sexual Activity   Alcohol Use Never     Fall Risk Screen:    STEADI Fall Risk Assessment was completed, and patient is at LOW risk for falls.Assessment completed on:4/4/2022    Depression Screening:  PHQ-2/PHQ-9 Depression Screening 4/4/2022   Little Interest or Pleasure in Doing Things 0-->not at all   Feeling Down, Depressed or Hopeless 0-->not at all   PHQ-9: Brief Depression Severity Measure Score 0       Health Habits and Functional and Cognitive Screening:  Functional & Cognitive Status 4/4/2022   Do you have difficulty preparing food and eating? No   Do you have difficulty bathing yourself, getting dressed or grooming yourself? No   Do you have difficulty using the toilet? No   Do you have difficulty moving around from place to place? No   Do you have trouble with steps or getting out of a bed or a chair? Yes   Current Diet Well Balanced Diet   Dental Exam Not up to date   Eye Exam Not up to date   Exercise (times per week) 0 times per week   Current Exercises Include No Regular Exercise   Do you need help using the phone?  No   Are you deaf or do you have serious difficulty hearing?  Yes   Do you need help with transportation? No   Do you need help shopping? No   Do you need help preparing meals?  No   Do you need help with housework?  No   Do you need help with laundry? No   Do you need help taking your medications? No   Do you need help managing money? No   Do you ever drive or ride in a car without wearing a seat belt? No   Have you felt unusual stress, anger or loneliness in the last month? No   Who do you live with? Alone   If you need help, do you have trouble finding someone available to you? No   Do you have difficulty concentrating, remembering or making decisions? No       Age-appropriate Screening Schedule:  Refer to the list below for future screening recommendations based on patient's age, sex and/or medical  conditions. Orders for these recommended tests are listed in the plan section. The patient has been provided with a written plan.    Health Maintenance   Topic Date Due   • TDAP/TD VACCINES (1 - Tdap) Never done   • ZOSTER VACCINE (1 of 2) Never done   • DXA SCAN  08/02/2019   • LIPID PANEL  10/20/2021   • INFLUENZA VACCINE  08/01/2022                The following data was reviewed by: Kathleen Brown MD on 04/04/2022:  CMP    CMP 5/19/21 10/20/21   Glucose 96 97   BUN 12 15   Creatinine 0.84 0.88   eGFR Non African Am  62   Sodium 140 136   Potassium 4.0 4.4   Chloride 103 100   Calcium 9.4 9.9   Albumin 3.7 4.30   Total Bilirubin 0.28 0.3   Alkaline Phosphatase 70 95   AST (SGOT) 18 18   ALT (SGPT) 7 (A) 9   (A) Abnormal value       Comments are available for some flowsheets but are not being displayed.           CBC    CBC 5/19/21   WBC 7.41   RBC 4.42   Hemoglobin 12.40   Hematocrit 38.4   MCV 86.9   MCH 28.1   MCHC 32.3 (A)   RDW 13.9   Platelets 339.00   (A) Abnormal value              TSH    TSH 5/19/21   TSH 3.230             HgB    HGB 5/19/21   Hemoglobin 12.40                        Assessment/Plan   CMS Preventative Services Quick Reference  Risk Factors Identified During Encounter  Chronic Pain   Depression/Dysphoria  Glaucoma or Family History of Glaucoma  Hearing Problem  Immunizations Discussed/Encouraged (specific Immunizations; Tdap and Shingrix  Inactivity/Sedentary  Obesity/Overweight   Urinary Incontinence  The above risks/problems have been discussed with the patient.  Follow up actions/plans if indicated are seen below in the Assessment/Plan Section.  Pertinent information has been shared with the patient in the After Visit Summary.  MEDICARE WELLNESS EXAM-SHE IS DUE FOR DEXA/ MAMMOGRAM (pt asked to have done), DONE WITH COLONOSCOPY, DONE WITH PAP AND PELVIC, UTD ON PNEUMOVAX/PREVNAR/COVID AND FLU VACCINES, DUE FOR  FLU VACCINE IN OCT, DEFERS SHINGLES/TD, SHE IS A FALL RISK DUE TO LBP AND  OA-SHE USES A POWER WHEELCHAIR AND WALKER, NO MEMORY ISSUES, DEPRESSION/ANXIETY ARE STABLE ON PROZAC, SHE LIVES ALONE WITH GOOD FAMILY SUPPORT FROM HER DAUGHTERS, SHE IS ABLE PERFORM ADL'S INDEPENDENTLY, NO URINARY INCONTINENCE, ABLE TO DO HER OWN FINANCES,  SHE WAS GIVEN A HA ON A LIVING WILL. NO TOBACCO USE, HEARING NEEDS TO BE SCREENED. MD LIST UPDATED.    ADVANCED DIRECTIVES: None   Diagnoses and all orders for this visit:    1. Encounter for annual wellness exam in Medicare patient (Primary)    2. Encounter for screening mammogram for breast cancer  -     Mammo Screening Digital Tomosynthesis Bilateral With CAD; Future    3. Colon cancer screening    4. Postmenopausal state  -     DEXA Bone Density Axial; Future    5. Essential hypertension  -     Comprehensive Metabolic Panel; Future  -     CBC (No Diff); Future  -     Lipid Panel; Future    6. Gastroesophageal reflux disease without esophagitis  Comments:  Overall stable with current medications famotidine and omeprazole.  No changes are needed at this time    7. Constipation, unspecified constipation type  Comments:  Stable with probiotics    8. Restless legs syndrome (RLS)    9. Bilateral leg edema  Comments:  Minimal, recommend she avoid all salt intake and continue spironolactone as prescribed    10. Chronic bilateral low back pain with bilateral sciatica  Comments:  will treat with steroid dose tata, I recommended and she defers PT or pain management referral.    Orders:  -     methylPREDNISolone (Medrol) 4 MG dose pack; Take as directed on package instructions.  Dispense: 1 each; Refill: 0    11. Blurred vision, bilateral  -     Ambulatory Referral to Ophthalmology    12. History of intraocular lens implant  -     Ambulatory Referral to Ophthalmology    13. Bilateral hearing loss, unspecified hearing loss type  Comments:  Commend repeat hearing testing and hearing aids    14. Lichen sclerosus of female genitalia  -     clobetasol (TEMOVATE) 0.05 % cream;  Apply 1 application topically to the appropriate area as directed 2 (Two) Times a Day.  Dispense: 15 g; Refill: 0        Follow Up:   Return in about 6 months (around 10/4/2022) for Recheck.     An After Visit Summary and PPPS were made available to the patient.

## 2022-04-26 ENCOUNTER — TELEPHONE (OUTPATIENT)
Dept: FAMILY MEDICINE CLINIC | Age: 82
End: 2022-04-26

## 2022-04-28 DIAGNOSIS — I10 ESSENTIAL HYPERTENSION: ICD-10-CM

## 2022-04-28 RX ORDER — FAMOTIDINE 40 MG/1
TABLET, FILM COATED ORAL
Qty: 180 TABLET | Refills: 1 | Status: SHIPPED | OUTPATIENT
Start: 2022-04-28 | End: 2022-10-26

## 2022-04-28 RX ORDER — FLUOXETINE HYDROCHLORIDE 20 MG/1
20 CAPSULE ORAL 2 TIMES DAILY
Qty: 180 CAPSULE | Refills: 1 | Status: SHIPPED | OUTPATIENT
Start: 2022-04-28 | End: 2022-10-26

## 2022-04-28 RX ORDER — SPIRONOLACTONE 50 MG/1
TABLET, FILM COATED ORAL
Qty: 90 TABLET | Refills: 1 | Status: SHIPPED | OUTPATIENT
Start: 2022-04-28 | End: 2022-10-26

## 2022-04-28 RX ORDER — FLUOXETINE HYDROCHLORIDE 20 MG/1
CAPSULE ORAL
Qty: 60 CAPSULE | Refills: 1 | OUTPATIENT
Start: 2022-04-28

## 2022-05-11 ENCOUNTER — LAB (OUTPATIENT)
Dept: LAB | Facility: HOSPITAL | Age: 82
End: 2022-05-11

## 2022-05-11 DIAGNOSIS — I10 ESSENTIAL HYPERTENSION: ICD-10-CM

## 2022-05-11 LAB
ALBUMIN SERPL-MCNC: 4.1 G/DL (ref 3.5–5.2)
ALBUMIN/GLOB SERPL: 1.6 G/DL
ALP SERPL-CCNC: 86 U/L (ref 39–117)
ALT SERPL W P-5'-P-CCNC: 12 U/L (ref 1–33)
ANION GAP SERPL CALCULATED.3IONS-SCNC: 11.6 MMOL/L (ref 5–15)
AST SERPL-CCNC: 19 U/L (ref 1–32)
BILIRUB SERPL-MCNC: 0.4 MG/DL (ref 0–1.2)
BUN SERPL-MCNC: 7 MG/DL (ref 8–23)
BUN/CREAT SERPL: 8.3 (ref 7–25)
CALCIUM SPEC-SCNC: 9.8 MG/DL (ref 8.6–10.5)
CHLORIDE SERPL-SCNC: 103 MMOL/L (ref 98–107)
CHOLEST SERPL-MCNC: 168 MG/DL (ref 0–200)
CO2 SERPL-SCNC: 25.4 MMOL/L (ref 22–29)
CREAT SERPL-MCNC: 0.84 MG/DL (ref 0.57–1)
DEPRECATED RDW RBC AUTO: 45.4 FL (ref 37–54)
EGFRCR SERPLBLD CKD-EPI 2021: 69 ML/MIN/1.73
ERYTHROCYTE [DISTWIDTH] IN BLOOD BY AUTOMATED COUNT: 13.7 % (ref 12.3–15.4)
GLOBULIN UR ELPH-MCNC: 2.5 GM/DL
GLUCOSE SERPL-MCNC: 92 MG/DL (ref 65–99)
HCT VFR BLD AUTO: 42.9 % (ref 34–46.6)
HDLC SERPL-MCNC: 61 MG/DL (ref 40–60)
HGB BLD-MCNC: 13.6 G/DL (ref 12–15.9)
LDLC SERPL CALC-MCNC: 92 MG/DL (ref 0–100)
LDLC/HDLC SERPL: 1.48 {RATIO}
MCH RBC QN AUTO: 28.2 PG (ref 26.6–33)
MCHC RBC AUTO-ENTMCNC: 31.7 G/DL (ref 31.5–35.7)
MCV RBC AUTO: 88.8 FL (ref 79–97)
PLATELET # BLD AUTO: 359 10*3/MM3 (ref 140–450)
PMV BLD AUTO: 9 FL (ref 6–12)
POTASSIUM SERPL-SCNC: 4.5 MMOL/L (ref 3.5–5.2)
PROT SERPL-MCNC: 6.6 G/DL (ref 6–8.5)
RBC # BLD AUTO: 4.83 10*6/MM3 (ref 3.77–5.28)
SODIUM SERPL-SCNC: 140 MMOL/L (ref 136–145)
TRIGL SERPL-MCNC: 83 MG/DL (ref 0–150)
VLDLC SERPL-MCNC: 15 MG/DL (ref 5–40)
WBC NRBC COR # BLD: 7.56 10*3/MM3 (ref 3.4–10.8)

## 2022-05-11 PROCEDURE — 80061 LIPID PANEL: CPT

## 2022-05-11 PROCEDURE — 36415 COLL VENOUS BLD VENIPUNCTURE: CPT

## 2022-05-11 PROCEDURE — 85027 COMPLETE CBC AUTOMATED: CPT

## 2022-05-11 PROCEDURE — 80053 COMPREHEN METABOLIC PANEL: CPT

## 2022-05-31 ENCOUNTER — APPOINTMENT (OUTPATIENT)
Dept: BONE DENSITY | Facility: HOSPITAL | Age: 82
End: 2022-05-31

## 2022-05-31 ENCOUNTER — APPOINTMENT (OUTPATIENT)
Dept: MAMMOGRAPHY | Facility: HOSPITAL | Age: 82
End: 2022-05-31

## 2022-07-01 ENCOUNTER — HOSPITAL ENCOUNTER (OUTPATIENT)
Dept: BONE DENSITY | Facility: HOSPITAL | Age: 82
Discharge: HOME | End: 2022-07-01

## 2022-07-01 ENCOUNTER — HOSPITAL ENCOUNTER (OUTPATIENT)
Dept: MAMMOGRAPHY | Facility: HOSPITAL | Age: 82
Discharge: HOME | End: 2022-07-01

## 2022-07-01 DIAGNOSIS — Z12.31 ENCOUNTER FOR SCREENING MAMMOGRAM FOR BREAST CANCER: ICD-10-CM

## 2022-07-01 DIAGNOSIS — Z78.0 POSTMENOPAUSAL STATE: ICD-10-CM

## 2022-07-01 PROCEDURE — 77063 BREAST TOMOSYNTHESIS BI: CPT

## 2022-07-01 PROCEDURE — 77067 SCR MAMMO BI INCL CAD: CPT

## 2022-07-01 PROCEDURE — 77080 DXA BONE DENSITY AXIAL: CPT

## 2022-07-26 ENCOUNTER — TELEPHONE (OUTPATIENT)
Dept: FAMILY MEDICINE CLINIC | Age: 82
End: 2022-07-26

## 2022-07-26 NOTE — TELEPHONE ENCOUNTER
Pt due for Prolia - Initial    Last Dexa -3.2  DEXA Bone Density Axial (07/01/2022 13:46)    Last CMP- Calcium 9.8  Comprehensive Metabolic Panel (05/11/2022 12:47)    Last office Visit   Office Visit with Kathleen Brown MD (04/04/2022)

## 2022-10-11 ENCOUNTER — OFFICE VISIT (OUTPATIENT)
Dept: FAMILY MEDICINE CLINIC | Age: 82
End: 2022-10-11

## 2022-10-11 ENCOUNTER — HOSPITAL ENCOUNTER (OUTPATIENT)
Dept: GENERAL RADIOLOGY | Facility: HOSPITAL | Age: 82
Discharge: HOME | End: 2022-10-11
Admitting: FAMILY MEDICINE

## 2022-10-11 VITALS
OXYGEN SATURATION: 94 % | HEART RATE: 75 BPM | DIASTOLIC BLOOD PRESSURE: 66 MMHG | WEIGHT: 213.4 LBS | BODY MASS INDEX: 37.81 KG/M2 | HEIGHT: 63 IN | SYSTOLIC BLOOD PRESSURE: 120 MMHG | TEMPERATURE: 98.9 F

## 2022-10-11 DIAGNOSIS — S89.92XA INJURY OF LEFT KNEE, INITIAL ENCOUNTER: ICD-10-CM

## 2022-10-11 DIAGNOSIS — M25.571 ACUTE RIGHT ANKLE PAIN: ICD-10-CM

## 2022-10-11 DIAGNOSIS — M25.572 ACUTE LEFT ANKLE PAIN: ICD-10-CM

## 2022-10-11 DIAGNOSIS — Z23 FLU VACCINE NEED: ICD-10-CM

## 2022-10-11 DIAGNOSIS — Z92.29 COVID-19 VACCINE SERIES COMPLETED: Primary | ICD-10-CM

## 2022-10-11 PROCEDURE — 99214 OFFICE O/P EST MOD 30 MIN: CPT | Performed by: FAMILY MEDICINE

## 2022-10-11 PROCEDURE — 73560 X-RAY EXAM OF KNEE 1 OR 2: CPT

## 2022-10-11 PROCEDURE — 91312 COVID-19 (PFIZER) BIVALENT BOOSTER 12+YRS: CPT | Performed by: FAMILY MEDICINE

## 2022-10-11 PROCEDURE — 73610 X-RAY EXAM OF ANKLE: CPT

## 2022-10-11 PROCEDURE — G0008 ADMIN INFLUENZA VIRUS VAC: HCPCS | Performed by: FAMILY MEDICINE

## 2022-10-11 PROCEDURE — 90662 IIV NO PRSV INCREASED AG IM: CPT | Performed by: FAMILY MEDICINE

## 2022-10-11 PROCEDURE — 0124A COVID-19 (PFIZER) BIVALENT BOOSTER 12+YRS: CPT | Performed by: FAMILY MEDICINE

## 2022-10-11 NOTE — PROGRESS NOTES
Yolanda Sharp presents to [facility].    Chief Complaint: ankle and foot injury    Subjective       History of Present Illness:  HPI     Yolanda was vaccuming in her power scooter and the vaccuum cord got caught up in her chair and she was pushed forward and her feet were pushed under the power scooter, she significantly twisted her ankles B and had severe bruising and pain in the ankles and feet.  This has improved but she still has R ankle pain in the front of her ankle and still has moderate edema in R anterior ankle.      Review of Systems:  Review of Systems   Constitutional: Negative for chills, fatigue and fever.   HENT: Negative for ear pain, sinus pressure and sore throat.    Eyes: Negative for blurred vision and double vision.   Respiratory: Negative for cough, shortness of breath and wheezing.    Cardiovascular: Negative for chest pain and palpitations.   Gastrointestinal: Negative for abdominal pain, blood in stool, constipation, diarrhea, nausea and vomiting.   Skin: Negative for rash.   Neurological: Negative for dizziness and headache.   Psychiatric/Behavioral: Negative for depressed mood.        Objective   Medical History:  Past Medical History:   • Arthritis   • Back problem   • Bleeding   • Bronchitis   • Cancer (HCC)   • Headache   • Hernia, hiatal   • HL (hearing loss)   • Hypertension   • Osteoporosis   • Sleep apnea   • Tinnitus     Past Surgical History:   • EYE SURGERY   • GALLBLADDER SURGERY   • HAND SURGERY   • HERNIA REPAIR   • KNEE SURGERY   • OOPHORECTOMY      Family History   Problem Relation Age of Onset   • Arthritis Mother    • Hypertension Mother    • Diabetes Father    • Arthritis Sister    • Cancer Sister    • Heart failure Sister    • Hypertension Sister    • Arthritis Brother    • Cancer Brother    • Heart failure Brother    • Hypertension Brother    • Kidney disease Brother      Social History     Tobacco Use   • Smoking status: Former      Packs/day: 1.00     Years: 33.00     Pack years: 33.00     Types: Cigarettes     Quit date:      Years since quittin.8   • Smokeless tobacco: Never   Substance Use Topics   • Alcohol use: Never       Health Maintenance Due   Topic Date Due   • TDAP/TD VACCINES (1 - Tdap) Never done   • ZOSTER VACCINE (1 of 2) Never done        Immunization History   Administered Date(s) Administered   • COVID-19 (PFIZER) BIVALENT BOOSTER 12+YRS 10/11/2022   • COVID-19 (PFIZER) PURPLE CAP 2021, 2021, 10/08/2021   • Fluzone High-Dose 65+yrs 10/11/2022       Allergies   Allergen Reactions   • Theophylline Other (See Comments) and Shortness Of Breath        Medications:  Current Outpatient Medications on File Prior to Visit   Medication Sig   • Apoaequorin (PREVAGEN EXTRA STRENGTH PO) Take  by mouth Daily.   • Calcium-Vitamin D-Vitamin K 500-100-40 MG-UNT-MCG chewable tablet Calcium for Women 500-100-40 mg-unit-mcg oral tablet,chewable chew 2 tablets by oral route daily   Active   • carbidopa-levodopa (SINEMET)  MG per tablet TAKE ONE TABLET BY MOUTH EVERY NIGHT AT BEDTIME   • diphenhydrAMINE (BENADRYL) 25 mg capsule Take 25 mg by mouth Every 6 (Six) Hours As Needed for Itching.   • famotidine (PEPCID) 40 MG tablet TAKE ONE TABLET BY MOUTH TWICE DAILY   • FLUoxetine (PROzac) 20 MG capsule Take 1 capsule by mouth 2 (Two) Times a Day.   • melatonin 3 MG tablet melatonin 3 mg oral tablet take 1 tablet by oral route daily   Active   • multivitamin with minerals tablet tablet multivitamin oral capsule take 1 capsule by oral route daily   Active   • omeprazole (priLOSEC) 40 MG capsule Take 40 mg by mouth Daily.   • spironolactone (ALDACTONE) 50 MG tablet TAKE ONE TABLET BY MOUTH EVERY DAY   • [DISCONTINUED] Calcium Carbonate-Vitamin D (calcium-vitamin D) 500-200 MG-UNIT tablet per tablet Take 2 tablets by mouth Daily.   • [DISCONTINUED] clobetasol (TEMOVATE) 0.05 % cream Apply 1 application topically to the  "appropriate area as directed 2 (Two) Times a Day.   • [DISCONTINUED] denosumab (PROLIA) 60 MG/ML solution prefilled syringe syringe Inject 1 mL under the skin into the appropriate area as directed Every 6 (Six) Months. Indications: Osteoporosis   • [DISCONTINUED] meclizine (ANTIVERT) 25 MG tablet Take 1 tablet by mouth 2 (Two) Times a Day.   • [DISCONTINUED] methylPREDNISolone (Medrol) 4 MG dose pack Take as directed on package instructions.   • [DISCONTINUED] ondansetron ODT (ZOFRAN-ODT) 4 MG disintegrating tablet 1 tablet Every 6 (Six) Hours As Needed.     No current facility-administered medications on file prior to visit.       Vital Signs:   /66 (BP Location: Left arm, Patient Position: Sitting, Cuff Size: Large Adult)   Pulse 75   Temp 98.9 °F (37.2 °C) (Oral)   Ht 159.4 cm (62.76\")   Wt 96.8 kg (213 lb 6.4 oz)   SpO2 94% Comment: room air  BMI 38.09 kg/m²       Physical Exam:  Physical Exam  Vitals and nursing note reviewed.   Constitutional:       General: She is not in acute distress.     Appearance: Normal appearance. She is not ill-appearing, toxic-appearing or diaphoretic.   HENT:      Head: Normocephalic and atraumatic.      Right Ear: Tympanic membrane, ear canal and external ear normal.      Left Ear: Tympanic membrane, ear canal and external ear normal.      Nose: No congestion or rhinorrhea.      Mouth/Throat:      Mouth: Mucous membranes are moist.      Pharynx: Oropharynx is clear. No oropharyngeal exudate or posterior oropharyngeal erythema.   Eyes:      Extraocular Movements: Extraocular movements intact.      Conjunctiva/sclera: Conjunctivae normal.      Pupils: Pupils are equal, round, and reactive to light.   Cardiovascular:      Rate and Rhythm: Normal rate and regular rhythm.      Heart sounds: Normal heart sounds.   Pulmonary:      Effort: Pulmonary effort is normal.      Breath sounds: Normal breath sounds. No wheezing, rhonchi or rales.   Abdominal:      General: Abdomen is " flat.      Palpations: Abdomen is soft.   Musculoskeletal:      Cervical back: Neck supple. No rigidity.      Right ankle: Swelling and deformity present. Tenderness present over the lateral malleolus and medial malleolus. Decreased range of motion.      Left ankle: Swelling and deformity present. Tenderness present over the lateral malleolus and medial malleolus. Decreased range of motion.        Legs:    Lymphadenopathy:      Cervical: No cervical adenopathy.   Skin:     General: Skin is warm and dry.   Neurological:      Mental Status: She is alert and oriented to person, place, and time.   Psychiatric:         Mood and Affect: Mood normal.         Behavior: Behavior normal.         Result Review      The following data was reviewed by Kathleen Brown MD on 10/11/2022.  Lab Results   Component Value Date    WBC 7.56 05/11/2022    HGB 13.6 05/11/2022    HCT 42.9 05/11/2022    MCV 88.8 05/11/2022     05/11/2022     Lab Results   Component Value Date    GLUCOSE 92 05/11/2022    BUN 7 (L) 05/11/2022    CREATININE 0.84 05/11/2022    EGFRIFNONA 62 10/20/2021    BCR 8.3 05/11/2022    K 4.5 05/11/2022    CO2 25.4 05/11/2022    CALCIUM 9.8 05/11/2022    ALBUMIN 4.10 05/11/2022    LABIL2 1.2 (L) 05/19/2021    AST 19 05/11/2022    ALT 12 05/11/2022     Lab Results   Component Value Date    CHOL 168 05/11/2022    CHLPL 184 10/29/2019    TRIG 83 05/11/2022    HDL 61 (H) 05/11/2022    LDL 92 05/11/2022     Lab Results   Component Value Date    TSH 3.230 05/19/2021     Lab Results   Component Value Date    HGBA1C 5.6 05/19/2021     No results found for: PSA                    Assessment and Plan:          Diagnoses and all orders for this visit:    1. COVID-19 vaccine series completed (Primary)  -     COVID-19 Bivalent Booster (Pfizer) 12+yrs    2. Flu vaccine need  -     Fluzone High-Dose 65+yrs (0652-7266)    3. Acute right ankle pain  -     Cancel: XR Ankle 3+ View Right; Future  -     XR Ankle 3+ View Right;  Future  -     Diclofenac Sodium (VOLTAREN) 1 % gel gel; Apply 4 g topically to the appropriate area as directed 4 (Four) Times a Day As Needed (arthritis) for up to 30 days.  Dispense: 100 g; Refill: 0    4. Acute left ankle pain  -     Cancel: XR Ankle 3+ View Left; Future  -     XR Ankle 3+ View Left; Future  -     Diclofenac Sodium (VOLTAREN) 1 % gel gel; Apply 4 g topically to the appropriate area as directed 4 (Four) Times a Day As Needed (arthritis) for up to 30 days.  Dispense: 100 g; Refill: 0    5. Injury of left knee, initial encounter  -     Cancel: XR Knee 1 or 2 View Left; Future  -     XR Knee 1 or 2 View Left; Future  -     Diclofenac Sodium (VOLTAREN) 1 % gel gel; Apply 4 g topically to the appropriate area as directed 4 (Four) Times a Day As Needed (arthritis) for up to 30 days.  Dispense: 100 g; Refill: 0      Her x-rays were all negative and I reviewed them with patient today.  She does have some edema and hematomas and would benefit from warm moist heat and gentle massage and I will start her on topical diclofenac for the swelling and pain.  She may warrant MRI if she does not improve or gets worse but she is actually improving on a day-to-day basis and will follow-up with me if she has any changes or worsening symptoms.          Follow Up   Return if symptoms worsen or fail to improve.

## 2022-10-25 DIAGNOSIS — I10 ESSENTIAL HYPERTENSION: ICD-10-CM

## 2022-10-26 RX ORDER — FLUOXETINE HYDROCHLORIDE 20 MG/1
CAPSULE ORAL
Qty: 180 CAPSULE | Refills: 1 | Status: SHIPPED | OUTPATIENT
Start: 2022-10-26

## 2022-10-26 RX ORDER — SPIRONOLACTONE 50 MG/1
TABLET, FILM COATED ORAL
Qty: 90 TABLET | Refills: 1 | Status: SHIPPED | OUTPATIENT
Start: 2022-10-26 | End: 2022-11-30

## 2022-10-26 RX ORDER — FAMOTIDINE 40 MG/1
TABLET, FILM COATED ORAL
Qty: 180 TABLET | Refills: 1 | Status: SHIPPED | OUTPATIENT
Start: 2022-10-26

## 2022-11-30 ENCOUNTER — OFFICE VISIT (OUTPATIENT)
Dept: FAMILY MEDICINE CLINIC | Age: 82
End: 2022-11-30

## 2022-11-30 VITALS
HEART RATE: 73 BPM | DIASTOLIC BLOOD PRESSURE: 81 MMHG | HEIGHT: 63 IN | SYSTOLIC BLOOD PRESSURE: 124 MMHG | WEIGHT: 214 LBS | OXYGEN SATURATION: 92 % | BODY MASS INDEX: 37.92 KG/M2

## 2022-11-30 DIAGNOSIS — G43.009 MIGRAINE WITHOUT AURA AND WITHOUT STATUS MIGRAINOSUS, NOT INTRACTABLE: ICD-10-CM

## 2022-11-30 DIAGNOSIS — R73.9 ELEVATED BLOOD SUGAR: ICD-10-CM

## 2022-11-30 DIAGNOSIS — H81.13 BENIGN PAROXYSMAL POSITIONAL VERTIGO DUE TO BILATERAL VESTIBULAR DISORDER: ICD-10-CM

## 2022-11-30 DIAGNOSIS — R60.0 BILATERAL LEG EDEMA: ICD-10-CM

## 2022-11-30 DIAGNOSIS — R42 DIZZINESS: ICD-10-CM

## 2022-11-30 DIAGNOSIS — U07.1 COVID-19 VIRUS INFECTION: Primary | ICD-10-CM

## 2022-11-30 DIAGNOSIS — H61.23 CERUMEN DEBRIS ON TYMPANIC MEMBRANE OF BOTH EARS: ICD-10-CM

## 2022-11-30 PROCEDURE — 99214 OFFICE O/P EST MOD 30 MIN: CPT | Performed by: FAMILY MEDICINE

## 2022-11-30 RX ORDER — SPIRONOLACTONE 25 MG/1
25 TABLET ORAL DAILY
Qty: 90 TABLET | Refills: 1 | Status: SHIPPED | OUTPATIENT
Start: 2022-11-30

## 2022-11-30 RX ORDER — PSEUDOEPHEDRINE HCL 30 MG
100 TABLET ORAL
COMMUNITY

## 2022-11-30 RX ORDER — MECLIZINE HCL 12.5 MG/1
12.5 TABLET ORAL 3 TIMES DAILY PRN
Qty: 40 TABLET | Refills: 0 | Status: SHIPPED | OUTPATIENT
Start: 2022-11-30 | End: 2022-12-21

## 2022-11-30 NOTE — PROGRESS NOTES
Yolanda Sharp presents to [facility].    Chief Complaint:  Dizzy, covid infection    Subjective       History of Present Illness:  HPI      On 11/9/22 Ms Sharp, an 81-year-old female, presented s/p 23 hour admit with a chief complaint of feeling dizzy, syncope 3 X and convulsing per her neighbor. This occurred about an hour prior to arrival when patient was sitting on the couch talking with a friend and began to feel lightheaded and then lost consciousness for about 30 seconds. No associated chest pain. Neg cardiac work up with ekg and troponin. No head trauma. Has been feeling generally unwell for about a day or so now with body aches, chills, fatigue, nausea, decreased appetite. No interventions prior to arrival.  LABS: WBC was 8, covid positive, .  She was treated for covid with PAXLOVID. Never had any covid sx issues after getting IVF in the ER.  Today she c/o feeling dizzy, occurs when she sits up.     Head CT FINDINGS: The ventricles are normal. There is no mass or shift of midline structures. There is no intracranial hemorrhage. No significantsinus or osseous abnormality is seen.     CXR IMPRESSION:   There is blunting of the left costophrenic angle, may  represent small effusion/atelectasis/infiltrate    Review of Systems:  Review of Systems   Constitutional: Negative for chills, fatigue and fever.   HENT: Positive for sore throat. Negative for ear pain and sinus pressure.    Eyes: Negative for blurred vision and double vision.   Respiratory: Negative for cough, shortness of breath and wheezing.    Cardiovascular: Negative for chest pain and palpitations.   Gastrointestinal: Negative for abdominal pain, blood in stool, constipation, diarrhea, nausea and vomiting.   Skin: Negative for rash.   Neurological: Positive for dizziness. Negative for headache.   Psychiatric/Behavioral: Negative for depressed mood.        Objective   Medical History:  Past Medical History:   •  Arthritis   • Back problem   • Bleeding   • Bronchitis   • Cancer (HCC)   • Headache   • Hernia, hiatal   • HL (hearing loss)   • Hypertension   • Osteoporosis   • Sleep apnea   • Tinnitus     Past Surgical History:   • EYE SURGERY   • GALLBLADDER SURGERY   • HAND SURGERY   • HERNIA REPAIR   • KNEE SURGERY   • OOPHORECTOMY      Family History   Problem Relation Age of Onset   • Arthritis Mother    • Hypertension Mother    • Diabetes Father    • Arthritis Sister    • Cancer Sister    • Heart failure Sister    • Hypertension Sister    • Arthritis Brother    • Cancer Brother    • Heart failure Brother    • Hypertension Brother    • Kidney disease Brother      Social History     Tobacco Use   • Smoking status: Former     Packs/day: 1.00     Years: 33.00     Pack years: 33.00     Types: Cigarettes     Quit date:      Years since quittin.9   • Smokeless tobacco: Never   Substance Use Topics   • Alcohol use: Never       Health Maintenance Due   Topic Date Due   • TDAP/TD VACCINES (1 - Tdap) Never done   • ZOSTER VACCINE (1 of 2) Never done        Immunization History   Administered Date(s) Administered   • COVID-19 (PFIZER) BIVALENT BOOSTER 12+YRS 10/11/2022   • COVID-19 (PFIZER) PURPLE CAP 2021, 2021, 10/08/2021   • Fluzone High-Dose 65+yrs 10/11/2022       Allergies   Allergen Reactions   • Theophylline Other (See Comments) and Shortness Of Breath        Medications:  Current Outpatient Medications on File Prior to Visit   Medication Sig   • Calcium-Vitamin D-Vitamin K 500-100-40 MG-UNT-MCG chewable tablet Calcium for Women 500-100-40 mg-unit-mcg oral tablet,chewable chew 2 tablets by oral route daily   Active   • carbidopa-levodopa (SINEMET)  MG per tablet TAKE ONE TABLET BY MOUTH EVERY NIGHT AT BEDTIME   • diphenhydrAMINE (BENADRYL) 25 mg capsule Take 25 mg by mouth Every 6 (Six) Hours As Needed for Itching.   • famotidine (PEPCID) 40 MG tablet TAKE ONE TABLET BY MOUTH TWICE DAILY   •  "FLUoxetine (PROzac) 20 MG capsule TAKE ONE CAPSULE BY MOUTH TWICE DAILY   • melatonin 3 MG tablet melatonin 3 mg oral tablet take 1 tablet by oral route daily   Active   • multivitamin with minerals tablet tablet multivitamin oral capsule take 1 capsule by oral route daily   Active   • omeprazole (priLOSEC) 40 MG capsule Take 40 mg by mouth Daily.   • [DISCONTINUED] spironolactone (ALDACTONE) 50 MG tablet TAKE ONE TABLET BY MOUTH EVERY DAY   • Apoaequorin (PREVAGEN EXTRA STRENGTH PO) Take  by mouth Daily.   • docusate sodium 100 MG capsule Take 100 mg by mouth.     No current facility-administered medications on file prior to visit.       Vital Signs:   /81 (BP Location: Left arm, Patient Position: Sitting, Cuff Size: Adult)   Pulse 73   Ht 159.4 cm (62.76\")   Wt 97.1 kg (214 lb)   SpO2 92%   BMI 38.20 kg/m²       Physical Exam:  Physical Exam  Vitals and nursing note reviewed.   Constitutional:       General: She is not in acute distress.     Appearance: Normal appearance. She is not ill-appearing, toxic-appearing or diaphoretic.   HENT:      Head: Normocephalic and atraumatic.      Right Ear: Tympanic membrane, ear canal and external ear normal.      Left Ear: Tympanic membrane, ear canal and external ear normal.      Nose: No congestion or rhinorrhea.      Mouth/Throat:      Mouth: Mucous membranes are moist.      Pharynx: Oropharynx is clear. No oropharyngeal exudate or posterior oropharyngeal erythema.   Eyes:      Extraocular Movements: Extraocular movements intact.      Conjunctiva/sclera: Conjunctivae normal.      Pupils: Pupils are equal, round, and reactive to light.   Cardiovascular:      Rate and Rhythm: Normal rate and regular rhythm.      Heart sounds: Normal heart sounds.   Pulmonary:      Effort: Pulmonary effort is normal.      Breath sounds: Normal breath sounds. No wheezing, rhonchi or rales.   Abdominal:      General: Abdomen is flat.      Palpations: Abdomen is soft. "   Musculoskeletal:      Cervical back: Neck supple. No rigidity.   Lymphadenopathy:      Cervical: No cervical adenopathy.   Skin:     General: Skin is warm and dry.   Neurological:      Mental Status: She is alert and oriented to person, place, and time.   Psychiatric:         Mood and Affect: Mood normal.         Behavior: Behavior normal.         Result Review      The following data was reviewed by Kathleen Brown MD on 11/30/2022.  Lab Results   Component Value Date    WBC 7.56 05/11/2022    HGB 13.6 05/11/2022    HCT 42.9 05/11/2022    MCV 88.8 05/11/2022     05/11/2022     Lab Results   Component Value Date    GLUCOSE 92 05/11/2022    BUN 7 (L) 05/11/2022    CREATININE 0.84 05/11/2022    EGFRIFNONA 62 10/20/2021    BCR 8.3 05/11/2022    K 4.5 05/11/2022    CO2 25.4 05/11/2022    CALCIUM 9.8 05/11/2022    ALBUMIN 4.10 05/11/2022    LABIL2 1.2 (L) 05/19/2021    AST 19 05/11/2022    ALT 12 05/11/2022     Lab Results   Component Value Date    CHOL 168 05/11/2022    CHLPL 184 10/29/2019    TRIG 83 05/11/2022    HDL 61 (H) 05/11/2022    LDL 92 05/11/2022     Lab Results   Component Value Date    TSH 3.230 05/19/2021     Lab Results   Component Value Date    HGBA1C 5.6 05/19/2021     No results found for: PSA                    Assessment and Plan:          Diagnoses and all orders for this visit:    1. COVID-19 virus infection (Primary)  Comments:  Status posttreatment with Paxlovid, respiratory status is good.    2. Dizziness  Comments:  Due to benign positional vertigo.  We will treat with PT    3. Elevated blood sugar  Comments:  We will check a hemoglobin A1c to rule out diabetes  Orders:  -     Hemoglobin A1c; Future    4. Cerumen debris on tympanic membrane of both ears.  Man comes from  Comments:  Status post irrigation. Pt tolerated well  Orders:  -     Ear Cerumen Removal    5. Migraine without aura and without status migrainosus, not intractable  Comments:  referral for further eval with  neurology   Orders:  -     Ambulatory Referral to Neurology    6. Benign paroxysmal positional vertigo due to bilateral vestibular disorder  Comments:  referral to PT to eval and treat  Orders:  -     meclizine (ANTIVERT) 12.5 MG tablet; Take 1 tablet by mouth 3 (Three) Times a Day As Needed for Dizziness.  Dispense: 40 tablet; Refill: 0  -     Ambulatory Referral to Physical Therapy Evaluate and treat    7. Bilateral leg edema  Comments:  she is doing better, will decrease her spironolactone to 25 mg daily.  Orders:  -     spironolactone (ALDACTONE) 25 MG tablet; Take 1 tablet by mouth Daily.  Dispense: 90 tablet; Refill: 1          Follow Up   No follow-ups on file.

## 2022-12-13 ENCOUNTER — TELEPHONE (OUTPATIENT)
Dept: FAMILY MEDICINE CLINIC | Age: 82
End: 2022-12-13

## 2022-12-13 ENCOUNTER — READMISSION MANAGEMENT (OUTPATIENT)
Dept: CALL CENTER | Facility: HOSPITAL | Age: 82
End: 2022-12-13

## 2022-12-13 NOTE — TELEPHONE ENCOUNTER
Caller: [facility]    Relationship to patient:     Best call back number: [phone]    New or established patient?  [] New  [x] Established    Date of discharge: 12/13/2022    Facility discharged from: [facility]    Diagnosis/Symptoms:N/A    Length of stay (If applicable): N/A    Specialty Only: Did you see a [employer] [facility] provider?    [] Yes  [x] No  If so, who? [facility] PATIENT IS DISCHARGING TODAY. HOSPITAL CALLED AND SCHEDULED THIS APPOINTMENT FOR THIS PATIENT.

## 2022-12-13 NOTE — OUTREACH NOTE
Prep Survey    Flowsheet Row Responses   [facility] facility patient discharged from? Non-BH   Is LACE score < 7 ? Non-BH Discharge   Eligibility [facility]   [facility]   Date of Discharge 12/13/22   Discharge Disposition Home or Self Care   Discharge diagnosis Unknown   Does the patient have one of the following disease processes/diagnoses(primary or secondary)? Other   Prep survey completed? Yes          FRANCOISE SAM - Registered Nurse

## 2022-12-14 ENCOUNTER — TRANSITIONAL CARE MANAGEMENT TELEPHONE ENCOUNTER (OUTPATIENT)
Dept: CALL CENTER | Facility: HOSPITAL | Age: 82
End: 2022-12-14

## 2022-12-14 NOTE — OUTREACH NOTE
Call Center TCM Note    Flowsheet Row Responses   [facility] [place] patient discharged from? Non-BH   Does the patient have one of the following disease processes/diagnoses(primary or secondary)? Other   TCM attempt successful? No   Unsuccessful attempts Attempt 1   Does the patient have an appointment with their PCP within 7 days of discharge? Yes  [12/19/22 at 3:00 PM]          Nayeli Pérez RN    12/14/2022, 15:35 EST

## 2022-12-14 NOTE — OUTREACH NOTE
Call Center TCM Note    Flowsheet Row Responses   [facility] patient discharged from? Non-   Does the patient have one of the following disease processes/diagnoses(primary or secondary)? Other   TCM attempt successful? Yes   Call start time 1621   Call end time 1626   Discharge diagnosis Unknown   Person spoke with today (if not patient) and relationship Patient   Meds reviewed with patient/caregiver? Yes   Is the patient having any side effects they believe may be caused by any medication additions or changes? No   Does the patient have all medications ordered at discharge? Yes   Is the patient taking all medications as directed (includes completed medication regime)? Yes   Does the patient have an appointment with their PCP within 7 days of discharge? Yes  [12/19/22 at 3:00 PM]   Psychosocial issues? No   Did the patient receive a copy of their discharge instructions? Yes   Nursing interventions Reviewed instructions with patient   What is the patient's perception of their health status since discharge? Improving   Is the patient/caregiver able to teach back signs and symptoms related to disease process for when to call PCP? Yes   Is the patient/caregiver able to teach back signs and symptoms related to disease process for when to call 911? Yes   Is the patient/caregiver able to teach back the hierarchy of who to call/visit for symptoms/problems? PCP, Specialist, Home health nurse, Urgent Care, ED, 911 Yes   If the patient is a current smoker, are they able to teach back resources for cessation? Not a smoker   TCM call completed? Yes   Wrap up additional comments Pt states she is doing ok, just feeling tired. Pt denies any fever, or increased redness, swelling, drainage, warmth at incisional site. Pt verified PCP fu appt on 12/19/22.   Call end time 1626   Would this patient benefit from a Referral to Amb Social Work? No   Is the patient interested in additional calls from an ambulatory ?   NOTE:  applies to high risk patients requiring additional follow-up. Elvia Pérez RN    12/14/2022, 16:27 EST

## 2022-12-15 ENCOUNTER — TELEPHONE (OUTPATIENT)
Dept: FAMILY MEDICINE CLINIC | Age: 82
End: 2022-12-15

## 2022-12-15 NOTE — TELEPHONE ENCOUNTER
1st attempt: spoke with pt re overdue A1C, says she will do it when she comes in for her appt next week

## 2022-12-19 ENCOUNTER — OFFICE VISIT (OUTPATIENT)
Dept: FAMILY MEDICINE CLINIC | Age: 82
End: 2022-12-19

## 2022-12-19 VITALS
SYSTOLIC BLOOD PRESSURE: 126 MMHG | HEART RATE: 80 BPM | BODY MASS INDEX: 36.5 KG/M2 | OXYGEN SATURATION: 93 % | WEIGHT: 206 LBS | HEIGHT: 63 IN | DIASTOLIC BLOOD PRESSURE: 62 MMHG

## 2022-12-19 DIAGNOSIS — Z95.0 PACEMAKER: ICD-10-CM

## 2022-12-19 DIAGNOSIS — R11.0 NAUSEA: ICD-10-CM

## 2022-12-19 DIAGNOSIS — I49.5 SICK SINUS SYNDROME: Primary | ICD-10-CM

## 2022-12-19 PROCEDURE — 99495 TRANSJ CARE MGMT MOD F2F 14D: CPT | Performed by: FAMILY MEDICINE

## 2022-12-19 PROCEDURE — 1111F DSCHRG MED/CURRENT MED MERGE: CPT | Performed by: FAMILY MEDICINE

## 2022-12-19 RX ORDER — ONDANSETRON 4 MG/1
4 TABLET, FILM COATED ORAL EVERY 8 HOURS PRN
Qty: 20 TABLET | Refills: 0 | Status: SHIPPED | OUTPATIENT
Start: 2022-12-19

## 2022-12-19 RX ORDER — APIXABAN 5 MG/1
TABLET, FILM COATED ORAL
COMMUNITY
Start: 2022-12-13

## 2022-12-19 NOTE — PROGRESS NOTES
Yolanda Sharp presents to [facility].    Chief Complaint: ER visit for dizziness    Subjective       History of Present Illness:  HPI   Chronic ongoing daily nausea, she is on pepcid, s/p gallbladder removal, she is nauseated most days with intermittent dry heaves, ongoing for months.     Normal was recently hospitalized after she presented to [facility] after having a near syncopal episode.  She also complained of a headache and became dizzy and possible lost consciousness for 1-2 seconds.  She denied chest pain, palpitations or SOB.  Pt states she had similar episodes when she was diagnosed in Nov 9, 2022 with COVID (despite being vaccinated and boosted).  She states no prior cardiac symptoms, no stress test or echo.  She was neg for COVID, this admission to [facility].  Sent to [facility] for further evaluation. , Trop 28. In the ER episodes of long pauses up  to 18 s due to CHB. Baseline ECG shows SR/SB with prolonged LA interval.  Dr Rivera placed her pace maker and she she is doing quite well.  She was d/c home on eliquis and keflex.  She has completed the antibiotic.  She weas also on carbidopa/levadopa, benadryl, sarah, pepcid, prozac, hctz, melatonin, zofran.  Denies any further incidents with dizziness or syncope.    White blood cell count 7.9, hemoglobin 13.9, hematocrit 42.3, platelets 317, COVID and flu test negative, troponin 11.2, proBNP 236, sodium 137, calcium 9.0, blood sugar 102, creatinine 0.76.  Potassium 3.9, GFR greater than 60, EKG rate 68 with normal sinus rhythm and AV block and telemetry showed 4 episodes of approximately 5-second pauses on the strip consistent with CHB.      Review of Systems:  Review of Systems   Constitutional: Positive for fatigue. Negative for chills and fever.   HENT: Negative for ear pain, sinus pressure and sore throat.    Eyes: Negative for blurred vision and double vision.   Respiratory: Negative for cough, shortness  of breath and wheezing.    Cardiovascular: Negative for chest pain and palpitations.   Gastrointestinal: Negative for abdominal pain, blood in stool, constipation, diarrhea, nausea and vomiting.   Skin: Negative for rash.   Neurological: Positive for dizziness and headache.   Psychiatric/Behavioral: Negative for depressed mood.        Objective   Medical History:  Past Medical History:   • Arthritis   • Back problem   • Bleeding   • Bronchitis   • Cancer (HCC)   • Headache   • Hernia, hiatal   • HL (hearing loss)   • Hypertension   • Osteoporosis   • Sleep apnea   • Tinnitus     Past Surgical History:   • EYE SURGERY   • GALLBLADDER SURGERY   • HAND SURGERY   • HERNIA REPAIR   • KNEE SURGERY   • OOPHORECTOMY      Family History   Problem Relation Age of Onset   • Arthritis Mother    • Hypertension Mother    • Diabetes Father    • Arthritis Sister    • Cancer Sister    • Heart failure Sister    • Hypertension Sister    • Arthritis Brother    • Cancer Brother    • Heart failure Brother    • Hypertension Brother    • Kidney disease Brother      Social History     Tobacco Use   • Smoking status: Former     Packs/day: 1.00     Years: 33.00     Pack years: 33.00     Types: Cigarettes     Quit date:      Years since quittin.9   • Smokeless tobacco: Never   Substance Use Topics   • Alcohol use: Never       Health Maintenance Due   Topic Date Due   • TDAP/TD VACCINES (1 - Tdap) Never done   • ZOSTER VACCINE (1 of 2) Never done        Immunization History   Administered Date(s) Administered   • COVID-19 (PFIZER) BIVALENT BOOSTER 12+YRS 10/11/2022   • COVID-19 (PFIZER) PURPLE CAP 2021, 2021, 10/08/2021   • Fluzone High-Dose 65+yrs 10/11/2022       Allergies   Allergen Reactions   • Theophylline Other (See Comments) and Shortness Of Breath        Medications:  Current Outpatient Medications on File Prior to Visit   Medication Sig   • Calcium-Vitamin D-Vitamin K 500-100-40 MG-UNT-MCG chewable tablet Calcium  "for Women 500-100-40 mg-unit-mcg oral tablet,chewable chew 2 tablets by oral route daily   Active   • carbidopa-levodopa (SINEMET)  MG per tablet TAKE ONE TABLET BY MOUTH EVERY NIGHT AT BEDTIME   • diphenhydrAMINE (BENADRYL) 25 mg capsule Take 25 mg by mouth Every 6 (Six) Hours As Needed for Itching.   • docusate sodium 100 MG capsule Take 100 mg by mouth.   • famotidine (PEPCID) 40 MG tablet TAKE ONE TABLET BY MOUTH TWICE DAILY   • FLUoxetine (PROzac) 20 MG capsule TAKE ONE CAPSULE BY MOUTH TWICE DAILY   • meclizine (ANTIVERT) 12.5 MG tablet Take 1 tablet by mouth 3 (Three) Times a Day As Needed for Dizziness.   • melatonin 3 MG tablet melatonin 3 mg oral tablet take 1 tablet by oral route daily   Active   • multivitamin with minerals tablet tablet multivitamin oral capsule take 1 capsule by oral route daily   Active   • spironolactone (ALDACTONE) 25 MG tablet Take 1 tablet by mouth Daily.   • [DISCONTINUED] omeprazole (priLOSEC) 40 MG capsule Take 40 mg by mouth Daily.   • Eliquis 5 MG tablet tablet    • [DISCONTINUED] Apoaequorin (PREVAGEN EXTRA STRENGTH PO) Take  by mouth Daily.     No current facility-administered medications on file prior to visit.       Vital Signs:   /62   Pulse 80   Ht 159.4 cm (62.76\")   Wt 93.4 kg (206 lb)   SpO2 93% Comment: Room air  BMI 36.77 kg/m²       Physical Exam:  Physical Exam  Vitals and nursing note reviewed.   Constitutional:       General: She is not in acute distress.     Appearance: Normal appearance. She is not ill-appearing, toxic-appearing or diaphoretic.   HENT:      Head: Normocephalic and atraumatic.      Right Ear: Tympanic membrane, ear canal and external ear normal.      Left Ear: Tympanic membrane, ear canal and external ear normal.      Nose: No congestion or rhinorrhea.      Mouth/Throat:      Mouth: Mucous membranes are moist.      Pharynx: Oropharynx is clear. No oropharyngeal exudate or posterior oropharyngeal erythema.   Eyes:      " Extraocular Movements: Extraocular movements intact.      Conjunctiva/sclera: Conjunctivae normal.      Pupils: Pupils are equal, round, and reactive to light.   Cardiovascular:      Rate and Rhythm: Normal rate and regular rhythm.      Heart sounds: Normal heart sounds.   Pulmonary:      Effort: Pulmonary effort is normal.      Breath sounds: Normal breath sounds. No wheezing, rhonchi or rales.   Abdominal:      General: Abdomen is flat.      Palpations: Abdomen is soft.   Musculoskeletal:      Cervical back: Neck supple. No rigidity.   Lymphadenopathy:      Cervical: No cervical adenopathy.   Skin:     General: Skin is warm and dry.   Neurological:      Mental Status: She is alert and oriented to person, place, and time.   Psychiatric:         Mood and Affect: Mood normal.         Behavior: Behavior normal.         Result Review      The following data was reviewed by Kathleen Brown MD on 12/19/2022.  Lab Results   Component Value Date    WBC 7.56 05/11/2022    HGB 13.6 05/11/2022    HCT 42.9 05/11/2022    MCV 88.8 05/11/2022     05/11/2022     Lab Results   Component Value Date    GLUCOSE 92 05/11/2022    BUN 7 (L) 05/11/2022    CREATININE 0.84 05/11/2022    EGFRIFNONA 62 10/20/2021    BCR 8.3 05/11/2022    K 4.5 05/11/2022    CO2 25.4 05/11/2022    CALCIUM 9.8 05/11/2022    ALBUMIN 4.10 05/11/2022    LABIL2 1.2 (L) 05/19/2021    AST 19 05/11/2022    ALT 12 05/11/2022     Lab Results   Component Value Date    CHOL 168 05/11/2022    CHLPL 184 10/29/2019    TRIG 83 05/11/2022    HDL 61 (H) 05/11/2022    LDL 92 05/11/2022     Lab Results   Component Value Date    TSH 3.230 05/19/2021     Lab Results   Component Value Date    HGBA1C 5.6 05/19/2021     No results found for: PSA                    Assessment and Plan:          Diagnoses and all orders for this visit:    1. Sick sinus syndrome (HCC) (Primary)    2. Pacemaker    3. Nausea  Comments:  chronic nausea with intermittent dry  heaves-she needs re  clayton with her GI doctor for EGD.  Orders:  -     Lipase; Future  -     Amylase; Future  -     H. Pylori Breath Test - Breath, Lung; Future  -     ondansetron (Zofran) 4 MG tablet; Take 1 tablet by mouth Every 8 (Eight) Hours As Needed for Nausea or Vomiting.  Dispense: 20 tablet; Refill: 0    beulah is stable and doing quite well status post pacemaker placement.  She has no further signs or symptoms of sinus sick syndrome.  Her incision is clean dry and intact.  She is tolerating the pacemaker well.  Of note she does complain of ongoing chronic nausea and wanting medication refills for her Zofran.  I do not see that this has been worked up for her so I will start a work-up including lipase and amylase to evaluate the pancreas and H. pylori breath test to rule out peptic ulcer disease.        Follow Up   Return in about 3 months (around 3/19/2023), or if symptoms worsen or fail to improve, for Recheck.

## 2022-12-21 ENCOUNTER — LAB (OUTPATIENT)
Dept: LAB | Facility: HOSPITAL | Age: 82
End: 2022-12-21

## 2022-12-21 DIAGNOSIS — H81.13 BENIGN PAROXYSMAL POSITIONAL VERTIGO DUE TO BILATERAL VESTIBULAR DISORDER: ICD-10-CM

## 2022-12-21 DIAGNOSIS — R11.0 NAUSEA: ICD-10-CM

## 2022-12-21 DIAGNOSIS — R73.9 ELEVATED BLOOD SUGAR: ICD-10-CM

## 2022-12-21 LAB
AMYLASE SERPL-CCNC: 78 U/L (ref 28–100)
HBA1C MFR BLD: 5.6 % (ref 4.8–5.6)
LIPASE SERPL-CCNC: 37 U/L (ref 13–60)
UREA BREATH TEST QL: NEGATIVE

## 2022-12-21 PROCEDURE — 82150 ASSAY OF AMYLASE: CPT

## 2022-12-21 PROCEDURE — 83013 H PYLORI (C-13) BREATH: CPT

## 2022-12-21 PROCEDURE — 83036 HEMOGLOBIN GLYCOSYLATED A1C: CPT

## 2022-12-21 PROCEDURE — 83690 ASSAY OF LIPASE: CPT

## 2022-12-21 PROCEDURE — 36415 COLL VENOUS BLD VENIPUNCTURE: CPT

## 2022-12-21 RX ORDER — MECLIZINE HCL 12.5 MG/1
TABLET ORAL
Qty: 40 TABLET | Refills: 0 | Status: SHIPPED | OUTPATIENT
Start: 2022-12-21

## 2023-04-27 RX ORDER — FAMOTIDINE 40 MG/1
TABLET, FILM COATED ORAL
Qty: 180 TABLET | Refills: 0 | Status: SHIPPED | OUTPATIENT
Start: 2023-04-27

## 2023-04-27 RX ORDER — FLUOXETINE HYDROCHLORIDE 20 MG/1
CAPSULE ORAL
Qty: 180 CAPSULE | Refills: 0 | Status: SHIPPED | OUTPATIENT
Start: 2023-04-27

## 2023-05-24 DIAGNOSIS — R60.0 BILATERAL LEG EDEMA: ICD-10-CM

## 2023-05-24 RX ORDER — SPIRONOLACTONE 25 MG/1
TABLET ORAL
Qty: 120 TABLET | Refills: 0 | Status: SHIPPED | OUTPATIENT
Start: 2023-05-24

## 2023-07-25 ENCOUNTER — OFFICE VISIT (OUTPATIENT)
Dept: FAMILY MEDICINE CLINIC | Age: 83
End: 2023-07-25
Payer: MEDICARE

## 2023-07-25 VITALS
SYSTOLIC BLOOD PRESSURE: 129 MMHG | BODY MASS INDEX: 37.7 KG/M2 | OXYGEN SATURATION: 93 % | HEIGHT: 63 IN | WEIGHT: 212.8 LBS | HEART RATE: 72 BPM | DIASTOLIC BLOOD PRESSURE: 66 MMHG

## 2023-07-25 DIAGNOSIS — M54.41 CHRONIC BILATERAL LOW BACK PAIN WITH BILATERAL SCIATICA: ICD-10-CM

## 2023-07-25 DIAGNOSIS — Z12.11 COLON CANCER SCREENING: ICD-10-CM

## 2023-07-25 DIAGNOSIS — Z23 ENCOUNTER FOR IMMUNIZATION: ICD-10-CM

## 2023-07-25 DIAGNOSIS — R05.1 ACUTE COUGH: ICD-10-CM

## 2023-07-25 DIAGNOSIS — J06.9 VIRAL UPPER RESPIRATORY TRACT INFECTION: ICD-10-CM

## 2023-07-25 DIAGNOSIS — M54.42 CHRONIC BILATERAL LOW BACK PAIN WITH BILATERAL SCIATICA: ICD-10-CM

## 2023-07-25 DIAGNOSIS — G25.81 RESTLESS LEGS SYNDROME (RLS): ICD-10-CM

## 2023-07-25 DIAGNOSIS — G89.29 CHRONIC BILATERAL LOW BACK PAIN WITH BILATERAL SCIATICA: ICD-10-CM

## 2023-07-25 DIAGNOSIS — D49.2 ATYPICAL SQUAMOPROLIFERATIVE SKIN LESION: ICD-10-CM

## 2023-07-25 DIAGNOSIS — Z00.00 ENCOUNTER FOR ANNUAL WELLNESS EXAM IN MEDICARE PATIENT: ICD-10-CM

## 2023-07-25 DIAGNOSIS — K21.9 GASTROESOPHAGEAL REFLUX DISEASE WITHOUT ESOPHAGITIS: ICD-10-CM

## 2023-07-25 DIAGNOSIS — Z12.31 ENCOUNTER FOR SCREENING MAMMOGRAM FOR BREAST CANCER: ICD-10-CM

## 2023-07-25 DIAGNOSIS — H81.13 BENIGN PAROXYSMAL POSITIONAL VERTIGO DUE TO BILATERAL VESTIBULAR DISORDER: ICD-10-CM

## 2023-07-25 DIAGNOSIS — G47.33 OBSTRUCTIVE SLEEP APNEA SYNDROME: ICD-10-CM

## 2023-07-25 DIAGNOSIS — I10 ESSENTIAL HYPERTENSION: ICD-10-CM

## 2023-07-25 DIAGNOSIS — H91.93 BILATERAL HEARING LOSS, UNSPECIFIED HEARING LOSS TYPE: ICD-10-CM

## 2023-07-25 DIAGNOSIS — Z78.0 POSTMENOPAUSAL STATE: Primary | ICD-10-CM

## 2023-07-25 DIAGNOSIS — Z95.0 PACEMAKER: ICD-10-CM

## 2023-07-25 DIAGNOSIS — K59.00 CONSTIPATION, UNSPECIFIED CONSTIPATION TYPE: ICD-10-CM

## 2023-07-25 LAB
EXPIRATION DATE: NORMAL
FLUAV AG UPPER RESP QL IA.RAPID: NOT DETECTED
FLUBV AG UPPER RESP QL IA.RAPID: NOT DETECTED
INTERNAL CONTROL: NORMAL
Lab: NORMAL
SARS-COV-2 AG UPPER RESP QL IA.RAPID: NOT DETECTED

## 2023-07-25 PROCEDURE — 1160F RVW MEDS BY RX/DR IN RCRD: CPT | Performed by: FAMILY MEDICINE

## 2023-07-25 PROCEDURE — 1170F FXNL STATUS ASSESSED: CPT | Performed by: FAMILY MEDICINE

## 2023-07-25 PROCEDURE — 87428 SARSCOV & INF VIR A&B AG IA: CPT | Performed by: FAMILY MEDICINE

## 2023-07-25 PROCEDURE — 3074F SYST BP LT 130 MM HG: CPT | Performed by: FAMILY MEDICINE

## 2023-07-25 PROCEDURE — 99214 OFFICE O/P EST MOD 30 MIN: CPT | Performed by: FAMILY MEDICINE

## 2023-07-25 PROCEDURE — G0439 PPPS, SUBSEQ VISIT: HCPCS | Performed by: FAMILY MEDICINE

## 2023-07-25 PROCEDURE — 1159F MED LIST DOCD IN RCRD: CPT | Performed by: FAMILY MEDICINE

## 2023-07-25 PROCEDURE — 3078F DIAST BP <80 MM HG: CPT | Performed by: FAMILY MEDICINE

## 2023-07-25 RX ORDER — OMEPRAZOLE 40 MG/1
1 CAPSULE, DELAYED RELEASE ORAL DAILY
COMMUNITY

## 2023-07-25 NOTE — PROGRESS NOTES
The ABCs of the Annual Wellness Visit  Subsequent Medicare Wellness Visit    Subjective    Yolanda Sharp is a 82 y.o. female who presents for a Subsequent Medicare Wellness Visit.    The following portions of the patient's history were reviewed and   updated as appropriate: allergies, current medications, past family history, past medical history, past social history, past surgical history, and problem list.    Compared to one year ago, the patient feels her physical   health is better.    Compared to one year ago, the patient feels her mental   health is better.    Recent Hospitalizations:  She was admitted within the past 365 days at [facility] for SSS, pacemaker placed.       Advance Care Planning  Advance Directive is not on file.  ACP discussion was held with the patient during this visit. Patient does not have an advance directive, information provided.    Does the patient have evidence of cognitive impairment? No    Aspirin is not on active medication list.  Aspirin use is not indicated based on review of current medical condition/s. Risk of harm outweighs potential benefits.  .    Patient Active Problem List   Diagnosis   • Migraines   • Osteoporosis   • Sleep apnea   • Depression   • Hypertension   • Arthritis of left hip   • Pacemaker   • Benign paroxysmal positional vertigo due to bilateral vestibular disorder   • Gastroesophageal reflux disease without esophagitis   • Restless legs syndrome (RLS)   • Chronic bilateral low back pain with bilateral sciatica       Current Medical Providers:  Patient Care Team:  Kathleen Brown MD as PCP - General (Family Medicine)  Augusto Bar MD as Consulting Physician (Sleep Medicine)  Dedrick Greenwood MD as Consulting Physician (Otolaryngology)  Raulito Carter MD as Consulting Physician (Pulmonary Disease)  Prosper Rivera DO as Consulting Physician (Cardiac Electrophysiology)    No opioid medication identified on active medication  "list. I have reviewed chart for other potential  high risk medication/s and harmful drug interactions in the elderly.             Objective    Vitals:    23 1558   BP: 129/66   BP Location: Right arm   Patient Position: Sitting   Pulse: 72   SpO2: 93%   Weight: 96.5 kg (212 lb 12.8 oz)   Height: 159.4 cm (62.76\")     Estimated body mass index is 37.99 kg/m² as calculated from the following:    Height as of this encounter: 159.4 cm (62.76\").    Weight as of this encounter: 96.5 kg (212 lb 12.8 oz).    Class 2 Severe Obesity (BMI >=35 and <=39.9). Obesity-related health conditions include the following: obstructive sleep apnea and hypertension. Obesity is unchanged. BMI is is above average; BMI management plan is completed. We discussed portion control and increasing exercise.                HEALTH RISK ASSESSMENT    Smoking Status:  Social History     Tobacco Use   Smoking Status Former   • Packs/day: 1.00   • Years: 33.00   • Pack years: 33.00   • Types: Cigarettes   • Quit date:    • Years since quittin.5   Smokeless Tobacco Never     Alcohol Consumption:  Social History     Substance and Sexual Activity   Alcohol Use Never     Fall Risk Screen:    STEADI Fall Risk Assessment was completed, and patient is at LOW risk for falls.Assessment completed on:2023    Depression Screenin/25/2023     4:03 PM   PHQ-2/PHQ-9 Depression Screening   Little Interest or Pleasure in Doing Things 0-->not at all   Feeling Down, Depressed or Hopeless 0-->not at all   PHQ-9: Brief Depression Severity Measure Score 0       Health Habits and Functional and Cognitive Screenin/25/2023     4:03 PM   Functional & Cognitive Status   Do you have difficulty preparing food and eating? No   Do you have difficulty bathing yourself, getting dressed or grooming yourself? No   Do you have difficulty using the toilet? No   Do you have difficulty moving around from place to place? No   Do you have trouble with steps " or getting out of a bed or a chair? No   Current Diet Well Balanced Diet   Dental Exam Not up to date   Eye Exam Not up to date   Exercise (times per week) 0 times per week   Current Exercises Include No Regular Exercise   Do you need help using the phone?  No   Are you deaf or do you have serious difficulty hearing?  No   Do you need help to go to places out of walking distance? No   Do you need help shopping? No   Do you need help preparing meals?  No   Do you need help with housework?  No   Do you need help with laundry? No   Do you need help taking your medications? No   Do you need help managing money? No   Do you ever drive or ride in a car without wearing a seat belt? No   Have you felt unusual stress, anger or loneliness in the last month? No   Who do you live with? Alone   If you need help, do you have trouble finding someone available to you? Yes   Have you been bothered in the last four weeks by sexual problems? No   Do you have difficulty concentrating, remembering or making decisions? No       Age-appropriate Screening Schedule:  Refer to the list below for future screening recommendations based on patient's age, sex and/or medical conditions. Orders for these recommended tests are listed in the plan section. The patient has been provided with a written plan.    Health Maintenance   Topic Date Due   • COVID-19 Vaccine (5 - Pfizer series) 02/11/2023   • LIPID PANEL  05/11/2023   • TDAP/TD VACCINES (1 - Tdap) 07/25/2024 (Originally 12/16/1959)   • ZOSTER VACCINE (1 of 2) 07/25/2024 (Originally 12/16/1990)   • INFLUENZA VACCINE  10/01/2023   • DXA SCAN  07/01/2024   • ANNUAL WELLNESS VISIT  07/25/2024   • Pneumococcal Vaccine 65+  Discontinued              Outpatient Medications Prior to Visit   Medication Sig Dispense Refill   • Calcium-Vitamin D-Vitamin K 500-100-40 MG-UNT-MCG chewable tablet Calcium for Women 500-100-40 mg-unit-mcg oral tablet,chewable chew 2 tablets by oral route daily   Active     •  carbidopa-levodopa (SINEMET)  MG per tablet TAKE 1 TABLET BY MOUTH EVERY NIGHT AT BEDTIME 30 tablet 2   • diphenhydrAMINE (BENADRYL) 25 mg capsule Take 1 capsule by mouth Every 6 (Six) Hours As Needed for Itching.     • Eliquis 5 MG tablet tablet      • famotidine (PEPCID) 40 MG tablet TAKE 1 TABLET BY MOUTH TWICE DAILY 180 tablet 0   • FLUoxetine (PROzac) 20 MG capsule TAKE ONE CAPSULE BY MOUTH TWICE DAILY 90 capsule 0   • meclizine (ANTIVERT) 12.5 MG tablet TAKE ONE TABLET BY MOUTH THREE TIMES DAILY AS NEEDED FOR dizziness 40 tablet 0   • melatonin 3 MG tablet melatonin 3 mg oral tablet take 1 tablet by oral route daily   Active     • multivitamin with minerals tablet tablet multivitamin oral capsule take 1 capsule by oral route daily   Active     • omeprazole (priLOSEC) 40 MG capsule Take 1 capsule by mouth Daily.     • ondansetron (Zofran) 4 MG tablet Take 1 tablet by mouth Every 8 (Eight) Hours As Needed for Nausea or Vomiting. 20 tablet 0   • spironolactone (ALDACTONE) 25 MG tablet TAKE 1 TABLET BY MOUTH ONCE DAILY 120 tablet 0   • docusate sodium 100 MG capsule Take 100 mg by mouth. (Patient not taking: Reported on 7/25/2023)       No facility-administered medications prior to visit.       CMS Preventative Services Quick Reference  Risk Factors Identified During Encounter  Hearing Problem: Referral to Audiologist ordered  Immunizations Discussed/Encouraged: Tdap, Shingrix, and COVID19  Inactivity/Sedentary:  recc chair exercises  Dental Screening Recommended  Vision Screening Recommended  The above risks/problems have been discussed with the patient.  Pertinent information has been shared with the patient in the After Visit Summary.  An After Visit Summary and PPPS were made available to the patient.    Follow Up:   Next Medicare Wellness visit to be scheduled in 1 year.       Additional E&M Note during same encounter follows:  Patient has multiple medical problems which are significant and separately  identifiable that require additional work above and beyond the Medicare Wellness Visit.         Yolanda Sharp presents to [facility].    Chief Complaint:  BP follow up        Subjective   History of Present Illness:  HPI    Chronic lower back is stable and under good control, she has LE weakness, uses a hooverround. She has had chronic back pain for years, tried and failed pain management with steroid shots in spine and PT.      Swelling is stable in her LE and she is on spironolactone     She is also having more allergy symptoms with runny nose, mild cough, and sore throat, no fever.  No sinus pain but she has headaches and feels  tired.      F/U for hypertension.  Her current cardiac medication regimen includes a diuretic spironolactone.  Ms. Sharp does not check her blood pressure other than at her clinic appointments.  She is tolerating the medication well without side effects.  Compliance with treatment has been good; she takes her medication as directed, maintains her diet and exercise regimen, and follows up as directed.      SSS and s/p cardiac pacemaker placement and her pacemaker is moving around in her chest.    She is on cpap and tolerating well for her sleep apnea     She has chronic restless leg syndrome which is stable on her carbidopa /evodopa at nighttime and she is doing well on this medication.      She has chronic constipation and she is doing well on pro 10 probiotics with meals     Her depression is very well controlled on Prozac and she takes this daily and has no issues with anxiety, no SI/HI.  Sleeping well at night on melatonin     Chronic GERD is stable on Pepcid and omeprazole      sore on L forehead that wont heal x 4-5 months                Result Review   The following data was reviewed by Kathleen Brown MD on 07/25/2023.  Lab Results   Component Value Date    WBC 7.56 05/11/2022    HGB 13.6 05/11/2022    HCT 42.9 05/11/2022    MCV 88.8  "05/11/2022     05/11/2022     Lab Results   Component Value Date    GLUCOSE 92 05/11/2022    BUN 7 (L) 05/11/2022    CREATININE 0.84 05/11/2022    EGFR 69.0 05/11/2022    BCR 8.3 05/11/2022    K 4.5 05/11/2022    CO2 25.4 05/11/2022    CALCIUM 9.8 05/11/2022    ALBUMIN 4.10 05/11/2022    BILITOT 0.4 05/11/2022    AST 19 05/11/2022    ALT 12 05/11/2022     Lab Results   Component Value Date    CHOL 168 05/11/2022    CHLPL 184 10/29/2019    TRIG 83 05/11/2022    HDL 61 (H) 05/11/2022    LDL 92 05/11/2022     Lab Results   Component Value Date    TSH 3.230 05/19/2021     Lab Results   Component Value Date    HGBA1C 5.60 12/21/2022     No results found for: PSA      No results found for: [id number]          Assessment and Plan:   Diagnoses and all orders for this visit:    1. Postmenopausal state (Primary)  Comments:  dexa is UTD    2. Encounter for annual wellness exam in Medicare patient    3. Encounter for screening mammogram for breast cancer  Comments:  breast exam WNL-mammogram ordered  Orders:  -     Mammo Screening Digital Tomosynthesis Bilateral With CAD; Future    4. Encounter for immunization  Comments:  recc covid, shingrix, and tdap vaccination  Orders:  -     COVID-19 (Pfizer) Bivalent 12+yrs    5. Colon cancer screening  Comments:  done with colonoscopies due to age    6. Constipation, unspecified constipation type  Comments:  stable, no changes needed in current [place] plan  Orders:  -     TSH+Free T4; Future    7. Essential hypertension  Comments:  BP is well controlled, recc she avoid Na in diet  Orders:  -     Comprehensive Metabolic Panel; Future  -     CBC (No Diff); Future  -     Lipid Panel; Future    8. Pacemaker  Comments:  it is \"moving\" so I will set her back up asap with her cardiologist  Orders:  -     Ambulatory Referral to Cardiology    9. Benign paroxysmal positional vertigo due to bilateral vestibular disorder  Comments:  stable on prn meclizine, she rarely has vertigo    10. " Gastroesophageal reflux disease without esophagitis  Comments:  chronic, stable on famotidine and omeprazole, tolerates meds well.    11. Restless legs syndrome (RLS)  Comments:  stable on carbidopa/levadopa, ok to change her meds to 11 am and 11pm dosing    12. Chronic bilateral low back pain with bilateral sciatica  Comments:  overall stable no changes needed in current treatment plan    13. Obstructive sleep apnea syndrome  Comments:  stable on cpap    14. Bilateral hearing loss, unspecified hearing loss type  Comments:  Referral to [facility] for further work-up  Orders:  -     Ambulatory Referral to Audiology    15. Acute cough  -     POCT SARS-CoV-2 Antigen OLIVA + Flu    16. Atypical squamoproliferative skin lesion  Comments:  Suspicious of squamous cell carcinoma.  Referral in place to dermatology to eval and biopsy  Orders:  -     Ambulatory Referral to Dermatology    17. Viral upper respiratory tract infection  Comments:  Mild SX's.  COVID test was negative.  Recommend she push fluids and rest and follow-up if no improvement or worsening symptoms                    Medications:      Current Outpatient Medications:   •  Calcium-Vitamin D-Vitamin K 500-100-40 MG-UNT-MCG chewable tablet, Calcium for Women 500-100-40 mg-unit-mcg oral tablet,chewable chew 2 tablets by oral route daily   Active, Disp: , Rfl:   •  carbidopa-levodopa (SINEMET)  MG per tablet, TAKE 1 TABLET BY MOUTH EVERY NIGHT AT BEDTIME, Disp: 30 tablet, Rfl: 2  •  diphenhydrAMINE (BENADRYL) 25 mg capsule, Take 1 capsule by mouth Every 6 (Six) Hours As Needed for Itching., Disp: , Rfl:   •  Eliquis 5 MG tablet tablet, , Disp: , Rfl:   •  famotidine (PEPCID) 40 MG tablet, TAKE 1 TABLET BY MOUTH TWICE DAILY, Disp: 180 tablet, Rfl: 0  •  FLUoxetine (PROzac) 20 MG capsule, TAKE ONE CAPSULE BY MOUTH TWICE DAILY, Disp: 90 capsule, Rfl: 0  •  meclizine (ANTIVERT) 12.5 MG tablet, TAKE ONE TABLET BY MOUTH THREE TIMES DAILY AS NEEDED FOR  "dizziness, Disp: 40 tablet, Rfl: 0  •  melatonin 3 MG tablet, melatonin 3 mg oral tablet take 1 tablet by oral route daily   Active, Disp: , Rfl:   •  multivitamin with minerals tablet tablet, multivitamin oral capsule take 1 capsule by oral route daily   Active, Disp: , Rfl:   •  omeprazole (priLOSEC) 40 MG capsule, Take 1 capsule by mouth Daily., Disp: , Rfl:   •  ondansetron (Zofran) 4 MG tablet, Take 1 tablet by mouth Every 8 (Eight) Hours As Needed for Nausea or Vomiting., Disp: 20 tablet, Rfl: 0  •  spironolactone (ALDACTONE) 25 MG tablet, TAKE 1 TABLET BY MOUTH ONCE DAILY, Disp: 120 tablet, Rfl: 0       Objective   Vital Signs:   /66 (BP Location: Right arm, Patient Position: Sitting)   Pulse 72   Ht 159.4 cm (62.76\")   Wt 96.5 kg (212 lb 12.8 oz)   SpO2 93%   BMI 37.99 kg/m²       Physical Exam:  Physical Exam  Vitals and nursing note reviewed.   Constitutional:       General: She is not in acute distress.     Appearance: Normal appearance. She is not ill-appearing, toxic-appearing or diaphoretic.   HENT:      Head: Normocephalic and atraumatic.      Right Ear: Tympanic membrane, ear canal and external ear normal. There is no impacted cerumen.      Left Ear: Tympanic membrane, ear canal and external ear normal. There is no impacted cerumen.      Nose: Nose normal. No congestion or rhinorrhea.      Mouth/Throat:      Mouth: Mucous membranes are moist.      Pharynx: Oropharynx is clear. No oropharyngeal exudate or posterior oropharyngeal erythema.   Eyes:      Extraocular Movements: Extraocular movements intact.      Conjunctiva/sclera: Conjunctivae normal.      Pupils: Pupils are equal, round, and reactive to light.   Cardiovascular:      Rate and Rhythm: Normal rate.      Pulses: Normal pulses.   Pulmonary:      Effort: Pulmonary effort is normal. No respiratory distress.      Breath sounds: Normal breath sounds. No stridor. No wheezing, rhonchi or rales.   Chest:   Breasts:     Right: Normal.     "  Left: Normal.   Abdominal:      General: Bowel sounds are normal. There is no distension.      Tenderness: There is no abdominal tenderness. There is no right CVA tenderness or left CVA tenderness.   Musculoskeletal:         General: No swelling or tenderness. Normal range of motion.      Cervical back: Normal range of motion and neck supple. No rigidity.   Lymphadenopathy:      Cervical: No cervical adenopathy.   Skin:     General: Skin is warm and dry.      Capillary Refill: Capillary refill takes less than 2 seconds.      Coloration: Skin is not jaundiced or pale.          Neurological:      Mental Status: She is alert and oriented to person, place, and time.   Psychiatric:         Mood and Affect: Mood normal.         Behavior: Behavior normal.                   Review of Systems:  Review of Systems   Constitutional:  Negative for chills and fever.   HENT:  Positive for sore throat. Negative for ear pain and sinus pressure.    Eyes:  Negative for blurred vision and double vision.   Respiratory:  Negative for cough, shortness of breath and wheezing.    Cardiovascular:  Negative for chest pain and palpitations.   Gastrointestinal:  Negative for abdominal pain, blood in stool, constipation, diarrhea, nausea and vomiting.   Musculoskeletal:  Positive for back pain.   Skin:  Negative for rash.   Neurological:  Positive for headache. Negative for dizziness.   Psychiatric/Behavioral:  Negative for sleep disturbance, suicidal ideas and depressed mood. The patient is not nervous/anxious.           Follow Up   Return in about 6 months (around 1/25/2024) for Recheck.           Medical History:  Past Medical History:   • Arthritis   • Back problem   • Bleeding   • Bronchitis   • Cancer   • Headache   • Hernia, hiatal   • HL (hearing loss)   • Hypertension   • Osteoporosis   • Sleep apnea   • Tinnitus     Past Surgical History:   • EYE SURGERY   • GALLBLADDER SURGERY   • HAND SURGERY   • HERNIA REPAIR   • KNEE SURGERY   •  OOPHORECTOMY      Family History   Problem Relation Age of Onset   • Arthritis Mother    • Hypertension Mother    • Diabetes Father    • Arthritis Sister    • Cancer Sister    • Heart failure Sister    • Hypertension Sister    • Arthritis Brother    • Cancer Brother    • Heart failure Brother    • Hypertension Brother    • Kidney disease Brother      Social History     Tobacco Use   • Smoking status: Former     Packs/day: 1.00     Years: 33.00     Pack years: 33.00     Types: Cigarettes     Quit date:      Years since quittin.5   • Smokeless tobacco: Never   Substance Use Topics   • Alcohol use: Never       Health Maintenance Due   Topic Date Due   • COVID-19 Vaccine (5 - Pfizer series) 2023   • LIPID PANEL  2023        Immunization History   Administered Date(s) Administered   • COVID-19 (PFIZER) BIVALENT 12+YRS 10/11/2022   • COVID-19 (PFIZER) Purple Cap Monovalent 2021, 2021, 10/08/2021   • Fluzone High-Dose 65+yrs 10/11/2022       Allergies   Allergen Reactions   • Theophylline Other (See Comments) and Shortness Of Breath

## 2023-07-25 NOTE — PATIENT INSTRUCTIONS
"Exercises to do While Sitting    Exercises that you do while sitting (chair exercises) can give you many of the same benefits as full exercise. Benefits include strengthening your heart, burning calories, and keeping muscles and joints healthy. Exercise can also improve your mood and help with depression and anxiety.  You may benefit from chair exercises if you are unable to do standing exercises due to:  Diabetic foot pain.  Obesity.  Illness.  Arthritis.  Recovery from surgery or injury.  Breathing problems.  Balance problems.  Another type of disability.  Before starting chair exercises, check with your health care provider or a physical therapist to find out how much exercise you can tolerate and which exercises are safe for you. If your health care provider approves:  Start out slowly and build up over time. Aim to work up to about 10-20 minutes for each exercise session.  Make exercise part of your daily routine.  Drink water when you exercise. Do not wait until you are thirsty. Drink every 10-15 minutes.  Stop exercising right away if you have pain, nausea, shortness of breath, or dizziness.  If you are exercising in a wheelchair, make sure to lock the wheels.  Ask your health care provider whether you can do adair chi or yoga. Many positions in these mind-body exercises can be modified to do while seated.  Warm-up  Before starting other exercises:  Sit up as straight as you can. Have your knees bent at 90 degrees, which is the shape of the capital letter \"L.\" Keep your feet flat on the floor.  Sit at the front edge of your chair, if you can.  Pull in (tighten) the muscles in your abdomen and stretch your spine and neck as straight as you can. Hold this position for a few minutes.  Breathe in and out evenly. Try to concentrate on your breathing, and relax your mind.  Stretching  Exercise A: Arm stretch  Hold your arms out straight in front of your body.  Bend your hands at the wrist with your fingers pointing " "up, as if signaling someone to stop. Notice the slight tension in your forearms as you hold the position.  Keeping your arms out and your hands bent, rotate your hands outward as far as you can and hold this stretch. Aim to have your thumbs pointing up and your pinkie fingers pointing down.  Slowly repeat arm stretches for one minute as tolerated.  Exercise B: Leg stretch  If you can move your legs, try to \"draw\" letters on the floor with the toes of your foot. Write your name with one foot.  Write your name with the toes of your other foot.  Slowly repeat the movements for one minute as tolerated.  Exercise C: Reach for the kenyatta  Reach your hands as far over your head as you can to stretch your spine.  Move your hands and arms as if you are climbing a rope.  Slowly repeat the movements for one minute as tolerated.  Range of motion exercises  Exercise A: Shoulder roll  Let your arms hang loosely at your sides.  Lift just your shoulders up toward your ears, then let them relax back down.  When your shoulders feel loose, rotate your shoulders in backward and forward circles.  Do shoulder rolls slowly for one minute as tolerated.  Exercise B: March in place  As if you are marching, pump your arms and lift your legs up and down. Lift your knees as high as you can.  If you are unable to lift your knees, just pump your arms and move your ankles and feet up and down.  March in place for one minute as tolerated.  Exercise C: Seated jumping jacks  Let your arms hang down straight.  Keeping your arms straight, lift them up over your head. Aim to point your fingers to the ceiling.  While you lift your arms, straighten your legs and slide your heels along the floor to your sides, as wide as you can.  As you bring your arms back down to your sides, slide your legs back together.  If you are unable to use your legs, just move your arms.  Slowly repeat seated jumping jacks for one minute as tolerated.  Strengthening " exercises  Exercise A: Shoulder squeeze  Hold your arms straight out from your body to your sides, with your elbows bent and your fists pointed at the ceiling.  Keeping your arms in the bent position, move them forward so your elbows and forearms meet in front of your face.  Open your arms back out as wide as you can with your elbows still bent, until you feel your shoulder blades squeezing together. Hold for 5 seconds.  Slowly repeat the movements forward and backward for one minute as tolerated.  Contact a health care provider if:  You have to stop exercising due to any of the following:  Pain.  Nausea.  Shortness of breath.  Dizziness.  Fatigue.  You have significant pain or soreness after exercising.  Get help right away if:  You have chest pain.  You have difficulty breathing.  These symptoms may represent a serious problem that is an emergency. Do not wait to see if the symptoms will go away. Get medical help right away. Call your local emergency services (911 in the [place] Do not drive yourself to the hospital.  Summary  Exercises that you do while sitting (chair exercises) can strengthen your heart, burn calories, and keep muscles and joints healthy.  You may benefit from chair exercises if you are unable to do standing exercises due to diabetic foot pain, obesity, recovery from surgery or injury, or other conditions.  Before starting chair exercises, check with your health care provider or a physical therapist to find out how much exercise you can tolerate and which exercises are safe for you.  This information is not intended to replace advice given to you by your health care provider. Make sure you discuss any questions you have with your health care provider.  Document Revised: 02/13/2022 Document Reviewed: 02/13/2022  Elsevier Patient Education © 2023 [employer]

## 2023-08-15 ENCOUNTER — TELEPHONE (OUTPATIENT)
Dept: FAMILY MEDICINE CLINIC | Age: 83
End: 2023-08-15
Payer: MEDICARE

## 2023-08-15 NOTE — TELEPHONE ENCOUNTER
1st attempt- spoke with pt about overdue results ordered by pcp on 7/25/23 and she stated she would try to be in within the next week or two

## 2023-09-06 DIAGNOSIS — R60.0 BILATERAL LEG EDEMA: ICD-10-CM

## 2023-09-06 RX ORDER — FLUOXETINE HYDROCHLORIDE 20 MG/1
CAPSULE ORAL
Qty: 180 CAPSULE | Refills: 0 | Status: SHIPPED | OUTPATIENT
Start: 2023-09-06

## 2023-09-06 RX ORDER — SPIRONOLACTONE 25 MG/1
TABLET ORAL
Qty: 90 TABLET | Refills: 0 | Status: SHIPPED | OUTPATIENT
Start: 2023-09-06

## 2023-09-06 RX ORDER — FAMOTIDINE 40 MG/1
TABLET, FILM COATED ORAL
Qty: 180 TABLET | Refills: 0 | OUTPATIENT
Start: 2023-09-06

## 2023-10-10 RX ORDER — FAMOTIDINE 40 MG/1
TABLET, FILM COATED ORAL
Qty: 180 TABLET | Refills: 0 | Status: SHIPPED | OUTPATIENT
Start: 2023-10-10

## 2023-10-18 ENCOUNTER — OFFICE VISIT (OUTPATIENT)
Dept: FAMILY MEDICINE CLINIC | Age: 83
End: 2023-10-18
Payer: MEDICARE

## 2023-10-18 ENCOUNTER — HOSPITAL ENCOUNTER (OUTPATIENT)
Dept: GENERAL RADIOLOGY | Facility: HOSPITAL | Age: 83
Discharge: HOME | End: 2023-10-18
Payer: MEDICARE

## 2023-10-18 ENCOUNTER — LAB (OUTPATIENT)
Dept: LAB | Facility: HOSPITAL | Age: 83
End: 2023-10-18
Payer: MEDICARE

## 2023-10-18 VITALS
DIASTOLIC BLOOD PRESSURE: 77 MMHG | WEIGHT: 215 LBS | OXYGEN SATURATION: 93 % | HEIGHT: 63 IN | HEART RATE: 80 BPM | BODY MASS INDEX: 38.09 KG/M2 | SYSTOLIC BLOOD PRESSURE: 143 MMHG

## 2023-10-18 DIAGNOSIS — H81.13 BENIGN PAROXYSMAL POSITIONAL VERTIGO DUE TO BILATERAL VESTIBULAR DISORDER: ICD-10-CM

## 2023-10-18 DIAGNOSIS — I10 ESSENTIAL HYPERTENSION: ICD-10-CM

## 2023-10-18 DIAGNOSIS — M54.2 NECK PAIN: ICD-10-CM

## 2023-10-18 DIAGNOSIS — G25.81 RESTLESS LEGS SYNDROME (RLS): ICD-10-CM

## 2023-10-18 DIAGNOSIS — I49.5 SICK SINUS SYNDROME: ICD-10-CM

## 2023-10-18 DIAGNOSIS — M54.40 CHRONIC MIDLINE LOW BACK PAIN WITH SCIATICA, SCIATICA LATERALITY UNSPECIFIED: ICD-10-CM

## 2023-10-18 DIAGNOSIS — Z95.0 PACEMAKER: ICD-10-CM

## 2023-10-18 DIAGNOSIS — G89.29 CHRONIC MIDLINE LOW BACK PAIN WITH SCIATICA, SCIATICA LATERALITY UNSPECIFIED: ICD-10-CM

## 2023-10-18 DIAGNOSIS — R73.9 ELEVATED BLOOD SUGAR: ICD-10-CM

## 2023-10-18 DIAGNOSIS — K59.00 CONSTIPATION, UNSPECIFIED CONSTIPATION TYPE: ICD-10-CM

## 2023-10-18 DIAGNOSIS — Z23 IMMUNIZATION DUE: Primary | ICD-10-CM

## 2023-10-18 DIAGNOSIS — R60.0 BILATERAL LEG EDEMA: ICD-10-CM

## 2023-10-18 DIAGNOSIS — M17.0 PRIMARY OSTEOARTHRITIS OF BOTH KNEES: ICD-10-CM

## 2023-10-18 DIAGNOSIS — K21.9 GASTROESOPHAGEAL REFLUX DISEASE WITHOUT ESOPHAGITIS: ICD-10-CM

## 2023-10-18 DIAGNOSIS — G47.33 OBSTRUCTIVE SLEEP APNEA SYNDROME: ICD-10-CM

## 2023-10-18 LAB
ALBUMIN SERPL-MCNC: 4.3 G/DL (ref 3.5–5.2)
ALBUMIN/GLOB SERPL: 1.3 G/DL
ALP SERPL-CCNC: 91 U/L (ref 39–117)
ALT SERPL W P-5'-P-CCNC: 15 U/L (ref 1–33)
ANION GAP SERPL CALCULATED.3IONS-SCNC: 9.3 MMOL/L (ref 5–15)
AST SERPL-CCNC: 21 U/L (ref 1–32)
BILIRUB SERPL-MCNC: 0.3 MG/DL (ref 0–1.2)
BUN SERPL-MCNC: 16 MG/DL (ref 8–23)
BUN/CREAT SERPL: 19.3 (ref 7–25)
CALCIUM SPEC-SCNC: 9.9 MG/DL (ref 8.6–10.5)
CHLORIDE SERPL-SCNC: 100 MMOL/L (ref 98–107)
CHOLEST SERPL-MCNC: 164 MG/DL (ref 0–200)
CO2 SERPL-SCNC: 27.7 MMOL/L (ref 22–29)
CREAT SERPL-MCNC: 0.83 MG/DL (ref 0.57–1)
DEPRECATED RDW RBC AUTO: 39.2 FL (ref 37–54)
EGFRCR SERPLBLD CKD-EPI 2021: 69.6 ML/MIN/1.73
ERYTHROCYTE [DISTWIDTH] IN BLOOD BY AUTOMATED COUNT: 13 % (ref 12.3–15.4)
GLOBULIN UR ELPH-MCNC: 3.2 GM/DL
GLUCOSE SERPL-MCNC: 96 MG/DL (ref 65–99)
HCT VFR BLD AUTO: 42.6 % (ref 34–46.6)
HDLC SERPL-MCNC: 57 MG/DL (ref 40–60)
HGB BLD-MCNC: 14.6 G/DL (ref 12–15.9)
LDLC SERPL CALC-MCNC: 93 MG/DL (ref 0–100)
LDLC/HDLC SERPL: 1.63 {RATIO}
MCH RBC QN AUTO: 29 PG (ref 26.6–33)
MCHC RBC AUTO-ENTMCNC: 34.3 G/DL (ref 31.5–35.7)
MCV RBC AUTO: 84.5 FL (ref 79–97)
PLATELET # BLD AUTO: 362 10*3/MM3 (ref 140–450)
PMV BLD AUTO: 10.2 FL (ref 6–12)
POTASSIUM SERPL-SCNC: 4.5 MMOL/L (ref 3.5–5.2)
PROT SERPL-MCNC: 7.5 G/DL (ref 6–8.5)
RBC # BLD AUTO: 5.04 10*6/MM3 (ref 3.77–5.28)
SODIUM SERPL-SCNC: 137 MMOL/L (ref 136–145)
T4 FREE SERPL-MCNC: 1.1 NG/DL (ref 0.93–1.7)
TRIGL SERPL-MCNC: 70 MG/DL (ref 0–150)
TSH SERPL DL<=0.05 MIU/L-ACNC: 3.43 UIU/ML (ref 0.27–4.2)
VLDLC SERPL-MCNC: 14 MG/DL (ref 5–40)
WBC NRBC COR # BLD: 8.2 10*3/MM3 (ref 3.4–10.8)

## 2023-10-18 PROCEDURE — 72040 X-RAY EXAM NECK SPINE 2-3 VW: CPT

## 2023-10-18 PROCEDURE — 83036 HEMOGLOBIN GLYCOSYLATED A1C: CPT

## 2023-10-18 PROCEDURE — 80061 LIPID PANEL: CPT

## 2023-10-18 PROCEDURE — 80053 COMPREHEN METABOLIC PANEL: CPT

## 2023-10-18 PROCEDURE — 84443 ASSAY THYROID STIM HORMONE: CPT

## 2023-10-18 PROCEDURE — 85027 COMPLETE CBC AUTOMATED: CPT

## 2023-10-18 PROCEDURE — 36415 COLL VENOUS BLD VENIPUNCTURE: CPT

## 2023-10-18 PROCEDURE — 84439 ASSAY OF FREE THYROXINE: CPT

## 2023-10-18 PROCEDURE — 72100 X-RAY EXAM L-S SPINE 2/3 VWS: CPT

## 2023-10-18 NOTE — PROGRESS NOTES
Yolanda Sharp presents to [facility].    Chief Complaint: worsening low back pain.     Subjective     History of Present Illness:  Hypertension  Pertinent negatives include no blurred vision, chest pain, palpitations or shortness of breath.     Chronic lower back pain and knee arthritic pain, and she is much worse, gladys with ambulation, pain can be severe, she is asking what her tx option are, including gel injections.  She has tried steroid injections in spine without benefit.  Opioids cause her severe constipation.  is stable and under good control, she has LE weakness, uses a hooverround. She has had chronic back pain for years, tried and failed pain management with steroid shots in spine and PT.  She also c/o pain over her shin Right.  She is not on tylenol     Narrative & Impression   PROCEDURE:  XR KNEE 1 OR 2 VW LEFT 2022     COMPARISON: None     INDICATIONS:  ANTERIOR LEFT KNEE PAIN X 3 WEEKS     FINDINGS:        There is no evidence for displaced fracture or dislocation. No definitive joint effusion   is observed.  Advanced tricompartmental osteoarthritic degenerative changes are observed. No focal   osseous abnormalities are seen. The soft tissues are unremarkable.     IMPRESSION:               No evidence for displaced fracture or dislocation.           Ongoing LE swelling, she is on spironolactone     Chronic allergies with runny nose, mild cough, and sore throat, no fever.  No sinus pain but she has headaches and feels tired.      F/U for hypertension, current cardiac medication regimen includes a diuretic spironolactone.  Ms. Sharp does not check her blood pressure other than at her clinic appointments.  She is tolerating the medication well without side effects.  Compliance with treatment has been good; she takes her medication as directed, maintains her diet but unable to exercise due to LBP and LE weakness, she follows up as directed.      Intermittent dizzy spells,  "she has meclizine and is not currently on any medications     She has h/o SSS and s/p cardiac pacemaker placement and her pacemaker.  She sees cardiologist DR Rivera. She is on eliquis     She has sleep apnea and is stable on her cpap.  She hasn't had her cpap checked or seen Dr Madrid in years and needs follow up     She has chronic restless leg syndrome and remains stable on her carbidopa /evodopa at nighttime and she is doing well on this medication.      She has chronic constipation and she is stable on her probiotics with meals     Her depression is in remission on Prozac and she takes this daily and has no issues with anxiety, no SI/HI.  Sleeping well at night on melatonin and benadrul.  She does not want to stoop her medicaiton     Chronic GERD is stable on Pepcid and omeprazole            Result Review   The following data was reviewed by Kathleen Brown MD on 10/18/2023.  Lab Results   Component Value Date    WBC 8.20 10/18/2023    HGB 14.6 10/18/2023    HCT 42.6 10/18/2023    MCV 84.5 10/18/2023     10/18/2023     Lab Results   Component Value Date    GLUCOSE 96 10/18/2023    BUN 16 10/18/2023    CREATININE 0.83 10/18/2023    EGFR 69.6 10/18/2023    BCR 19.3 10/18/2023    K 4.5 10/18/2023    CO2 27.7 10/18/2023    CALCIUM 9.9 10/18/2023    ALBUMIN 4.3 10/18/2023    BILITOT 0.3 10/18/2023    AST 21 10/18/2023    ALT 15 10/18/2023     Lab Results   Component Value Date    CHOL 164 10/18/2023    CHLPL 184 10/29/2019    TRIG 70 10/18/2023    HDL 57 10/18/2023    LDL 93 10/18/2023     Lab Results   Component Value Date    TSH 3.430 10/18/2023     Lab Results   Component Value Date    HGBA1C 5.60 12/21/2022     No results found for: \"PSA\"      No results found for: \"ASKD06VW\"            Assessment and Plan:   Diagnoses and all orders for this visit:    1. Immunization due (Primary)  Comments:  Recommend COVID, Tdap, Shingrix and flu vaccination  Orders:  -     Fluzone High-Dose 65+yrs " ([id number])    2. Chronic midline low back pain with sciatica, sciatica laterality unspecified  Comments:  Recommend PT.  We will go ahead and x-ray L-spine to further eval severity of DDD  Orders:  -     XR Spine Lumbar 2 or 3 View; Future    3. Primary osteoarthritis of both knees  Comments:  We will go ahead and set her up for orthopedics referral to [facility] for knee joint gel injections.  We will start her on topical diclofenac gel in the meantime  Orders:  -     Ambulatory Referral to Orthopedic Surgery    4. Bilateral leg edema  Comments:  Overall looks stable to me on spironolactone.  No changes needed current meds or treatment plan    5. Essential hypertension  Comments:  Borderline elevated.  She is to check home BP and call if >140/90, avoid sodium in diet.  Recommend chair exercises for aerobic activity  Orders:  -     Cancel: Comprehensive Metabolic Panel; Future  -     Cancel: CBC (No Diff); Future  -     Cancel: Lipid Panel; Future    6. Constipation, unspecified constipation type  Comments:  Overall stable, recommend she increase daily fiber intake    7. Sick sinus syndrome    8. Pacemaker  Comments:  No acute issues.  Follow-up with cardiology as directed.    9. Benign paroxysmal positional vertigo due to bilateral vestibular disorder  Comments:  Stable on as needed meclizine.    10. Gastroesophageal reflux disease without esophagitis  Comments:  Stable on famotidine, and omeprazole.  She is to avoid spicy foods and acidic foods    11. Restless legs syndrome (RLS)  Comments:  Overall stable and well-controlled on Sinemet.  She just takes 1 at bedtime nightly    12. Obstructive sleep apnea syndrome  Comments:  Continue CPAP.  She is compliant.  She needs follow-up with her sleep specialist to eval sleep apnea machine settings  Orders:  -     Ambulatory Referral to Sleep Medicine    13. Neck pain  Comments:  We will x-ray C-spine  Orders:  -     XR Spine Cervical 2 or 3 View; Future    14. Elevated blood  "sugar  -     Cancel: Hemoglobin A1c; Future  -     Hemoglobin A1c; Future    Other orders  -     Diclofenac Sodium (VOLTAREN) 1 % gel gel; Apply 4 g topically to the appropriate area as directed 4 (Four) Times a Day As Needed (arthritis) for up to 30 days.  Dispense: 100 g; Refill: 2              Objective     Medications:  Current Outpatient Medications   Medication Instructions    Calcium-Vitamin D-Vitamin K 500-100-40 MG-UNT-MCG chewable tablet Calcium for Women 500-100-40 mg-unit-mcg oral tablet,chewable chew 2 tablets by oral route daily   Active    carbidopa-levodopa (SINEMET)  MG per tablet 1 tablet, Oral, Every Night at Bedtime    Diclofenac Sodium (VOLTAREN) 4 g, Topical, 4 Times Daily PRN    diphenhydrAMINE (BENADRYL) 25 mg, Oral, Every 6 Hours PRN    Eliquis 5 MG tablet tablet No dose, route, or frequency recorded.    famotidine (PEPCID) 40 MG tablet TAKE 1 TABLET BY MOUTH TWICE DAILY    FLUoxetine (PROzac) 20 MG capsule TAKE ONE CAPSULE BY MOUTH TWICE DAILY    meclizine (ANTIVERT) 12.5 MG tablet TAKE ONE TABLET BY MOUTH THREE TIMES DAILY AS NEEDED FOR dizziness    melatonin 3 MG tablet melatonin 3 mg oral tablet take 1 tablet by oral route daily   Active    multivitamin with minerals tablet tablet multivitamin oral capsule take 1 capsule by oral route daily   Active    omeprazole (priLOSEC) 40 MG capsule 1 capsule, Oral, Daily    ondansetron (ZOFRAN) 4 mg, Oral, Every 8 Hours PRN    spironolactone (ALDACTONE) 25 MG tablet TAKE 1 TABLET BY MOUTH ONCE DAILY        Vital Signs:   /77 (BP Location: Right arm, Patient Position: Sitting)   Pulse 80   Ht 159.4 cm (62.76\")   Wt 97.5 kg (215 lb)   SpO2 93%   BMI 38.38 kg/m²             Physical Exam:  Physical Exam  Vitals and nursing note reviewed.   Constitutional:       General: She is not in acute distress.     Appearance: Normal appearance. She is not ill-appearing, toxic-appearing or diaphoretic.   HENT:      Head: Normocephalic and " atraumatic.      Right Ear: Tympanic membrane, ear canal and external ear normal.      Left Ear: Tympanic membrane, ear canal and external ear normal.      Nose: No congestion or rhinorrhea.      Mouth/Throat:      Mouth: Mucous membranes are moist.      Pharynx: Oropharynx is clear. No oropharyngeal exudate or posterior oropharyngeal erythema.   Eyes:      Extraocular Movements: Extraocular movements intact.      Conjunctiva/sclera: Conjunctivae normal.      Pupils: Pupils are equal, round, and reactive to light.   Cardiovascular:      Rate and Rhythm: Normal rate and regular rhythm.      Heart sounds: Normal heart sounds.   Pulmonary:      Effort: Pulmonary effort is normal.      Breath sounds: Normal breath sounds. No wheezing, rhonchi or rales.   Abdominal:      General: Abdomen is flat.      Palpations: Abdomen is soft. There is no mass.      Tenderness: There is no abdominal tenderness.      Hernia: No hernia is present.   Musculoskeletal:      Cervical back: Neck supple. No rigidity.      Right knee: Crepitus present. No swelling, erythema or ecchymosis. Decreased range of motion. Tenderness present. No LCL laxity or MCL laxity.      Instability Tests: Anterior drawer test negative.      Left knee: Crepitus present. No swelling, erythema or ecchymosis. Decreased range of motion. Tenderness present. No LCL laxity or MCL laxity.     Instability Tests: Anterior drawer test negative.      Right lower leg: No edema.      Left lower leg: No edema.      Comments: 5 out of 5 musculoskeletal strength in the left lower extremity, 4 out of 5 musculoskeletal strength in the left lower extremity with normal sensory exam in the lower extremity bilaterally.  2/4 patellar DTR bilaterally.  Positive straight leg raise right.  tender to palpation over the lumbar spine L2-4.  No muscle spasm noted in the paralumbar muscles bilaterally    FROM neck5 out of 5 musculoskeletal strength in the upper extremity bilaterally with normal  sensory exam in the upper extremity bilaterally.  2/4 brachial radial DTR bilaterally.   Nontender to palpation over the cervical spine.  Tender over para cervical muscles bilaterally      Knees-tender of medial and lateral joint lines B    Lymphadenopathy:      Cervical: No cervical adenopathy.   Skin:     General: Skin is warm and dry.   Neurological:      General: No focal deficit present.      Mental Status: She is alert and oriented to person, place, and time. Mental status is at baseline.      Cranial Nerves: Cranial nerves 2-12 are intact.      Motor: Weakness present.      Coordination: Coordination abnormal.      Gait: Gait abnormal.   Psychiatric:         Mood and Affect: Mood normal.         Behavior: Behavior normal.         Thought Content: Thought content normal.         Judgment: Judgment normal.           Review of Systems:  Review of Systems   Constitutional:  Negative for chills and fever.   HENT:  Negative for ear pain, sinus pressure and sore throat.    Eyes:  Negative for blurred vision and double vision.   Respiratory:  Negative for cough, shortness of breath and wheezing.    Cardiovascular:  Negative for chest pain and palpitations.   Gastrointestinal:  Negative for abdominal pain, blood in stool, constipation, diarrhea, nausea and vomiting.   Musculoskeletal:  Positive for arthralgias and back pain.   Skin:  Negative for rash.   Neurological:  Positive for dizziness. Negative for headache.   Psychiatric/Behavioral:  Negative for sleep disturbance, suicidal ideas and depressed mood. The patient is not nervous/anxious.               Follow Up   Return in about 6 months (around 4/18/2024) for Medicare Wellness.    Part of this note may be an electronic transcription/translation of spoken language to printed   text using the Dragon Dictation System.            Medical History:  There are no discontinued medications.   Past Medical History:    Arthritis    Back problem    Bleeding    Bronchitis     Cancer    Headache    Hernia, hiatal    HL (hearing loss)    Hypertension    Osteoporosis    Sleep apnea    Tinnitus     Past Surgical History:    EYE SURGERY    GALLBLADDER SURGERY    HAND SURGERY    HERNIA REPAIR    KNEE SURGERY    OOPHORECTOMY      Family History   Problem Relation Age of Onset    Arthritis Mother     Hypertension Mother     Diabetes Father     Arthritis Sister     Cancer Sister     Heart failure Sister     Hypertension Sister     Arthritis Brother     Cancer Brother     Heart failure Brother     Hypertension Brother     Kidney disease Brother      Social History     Tobacco Use    Smoking status: Former     Packs/day: 1.00     Years: 33.00     Additional pack years: 0.00     Total pack years: 33.00     Types: Cigarettes     Quit date:      Years since quittin.8    Smokeless tobacco: Never   Substance Use Topics    Alcohol use: Never       There are no preventive care reminders to display for this patient.       Immunization History   Administered Date(s) Administered    COVID-19 (PFIZER) BIVALENT 12+YRS 10/11/2022    COVID-19 (PFIZER) Purple Cap Monovalent 2021, 2021, 10/08/2021    Fluzone High-Dose 65+yrs 10/11/2022, 10/18/2023       Allergies   Allergen Reactions    Theophylline Other (See Comments) and Shortness Of Breath

## 2023-10-19 LAB — HBA1C MFR BLD: 5.8 % (ref 4.8–5.6)

## 2023-10-24 DIAGNOSIS — G89.29 CHRONIC MIDLINE LOW BACK PAIN WITH SCIATICA, SCIATICA LATERALITY UNSPECIFIED: Primary | ICD-10-CM

## 2023-10-24 DIAGNOSIS — M54.2 NECK PAIN: ICD-10-CM

## 2023-10-24 DIAGNOSIS — M54.40 CHRONIC MIDLINE LOW BACK PAIN WITH SCIATICA, SCIATICA LATERALITY UNSPECIFIED: Primary | ICD-10-CM

## 2023-11-16 ENCOUNTER — TELEPHONE (OUTPATIENT)
Dept: FAMILY MEDICINE CLINIC | Age: 83
End: 2023-11-16
Payer: MEDICARE

## 2023-11-28 DIAGNOSIS — R60.0 BILATERAL LEG EDEMA: ICD-10-CM

## 2023-11-29 ENCOUNTER — TELEPHONE (OUTPATIENT)
Dept: FAMILY MEDICINE CLINIC | Age: 83
End: 2023-11-29
Payer: MEDICARE

## 2023-11-29 RX ORDER — SPIRONOLACTONE 25 MG/1
TABLET ORAL
Qty: 90 TABLET | Refills: 0 | Status: SHIPPED | OUTPATIENT
Start: 2023-11-29

## 2023-11-29 RX ORDER — FAMOTIDINE 40 MG/1
TABLET, FILM COATED ORAL
Qty: 180 TABLET | Refills: 0 | Status: SHIPPED | OUTPATIENT
Start: 2023-11-29

## 2023-11-29 RX ORDER — FLUOXETINE HYDROCHLORIDE 20 MG/1
CAPSULE ORAL
Qty: 180 CAPSULE | Refills: 0 | Status: SHIPPED | OUTPATIENT
Start: 2023-11-29

## 2023-11-29 NOTE — TELEPHONE ENCOUNTER
Caller: Lila Yolanda ARTIS    Relationship: Self    Best call back number: [phone]    Equipment requested: ORDER FOR FOR REPAIRS NEEDS TO BE SENT FOR POWER, ARMS AND WHEELS     Reason for the request: PARTS ARE WORN OUT    Prescribing Provider: HUMBERTO OVALLE     Additional information or concerns: MICHAEL CONTACT # [phone]  THE ORDER NEEDS RO BE SENT TODAY BECAUSE THEY ARE COMING TOMORROW TO MAKE THE REPAIRS AND INSURANCE WILL NOT COVER  THEM WITHOUT THE ORDERS   PLEASE NOTIFY PATIENT WHEN THE ORDERS ARE SENT, SHE IS VERY CONCERNED ABOUT GETTING THE REPAIRS DONE BEFORE IT TOTALLY BREAKS DOWN

## 2023-12-26 DIAGNOSIS — R60.0 BILATERAL LEG EDEMA: ICD-10-CM

## 2023-12-26 RX ORDER — SPIRONOLACTONE 25 MG/1
TABLET ORAL
Qty: 90 TABLET | Refills: 0 | OUTPATIENT
Start: 2023-12-26

## 2023-12-26 RX ORDER — FLUOXETINE HYDROCHLORIDE 20 MG/1
20 CAPSULE ORAL 2 TIMES DAILY
Qty: 180 CAPSULE | Refills: 0 | OUTPATIENT
Start: 2023-12-26

## 2024-02-19 DIAGNOSIS — R60.0 BILATERAL LEG EDEMA: ICD-10-CM

## 2024-02-19 RX ORDER — SPIRONOLACTONE 25 MG/1
TABLET ORAL
Qty: 90 TABLET | Refills: 0 | Status: SHIPPED | OUTPATIENT
Start: 2024-02-19

## 2024-02-23 RX ORDER — FLUOXETINE HYDROCHLORIDE 20 MG/1
20 CAPSULE ORAL 2 TIMES DAILY
Qty: 180 CAPSULE | Refills: 0 | Status: SHIPPED | OUTPATIENT
Start: 2024-02-23

## 2024-04-17 ENCOUNTER — OFFICE VISIT (OUTPATIENT)
Dept: FAMILY MEDICINE CLINIC | Age: 84
End: 2024-04-17
Payer: MEDICARE

## 2024-04-17 VITALS
BODY MASS INDEX: 37.21 KG/M2 | HEIGHT: 63 IN | SYSTOLIC BLOOD PRESSURE: 115 MMHG | DIASTOLIC BLOOD PRESSURE: 73 MMHG | HEART RATE: 84 BPM | WEIGHT: 210 LBS | OXYGEN SATURATION: 93 %

## 2024-04-17 DIAGNOSIS — M54.40 CHRONIC MIDLINE LOW BACK PAIN WITH SCIATICA, SCIATICA LATERALITY UNSPECIFIED: ICD-10-CM

## 2024-04-17 DIAGNOSIS — I10 PRIMARY HYPERTENSION: ICD-10-CM

## 2024-04-17 DIAGNOSIS — Z12.11 COLON CANCER SCREENING: ICD-10-CM

## 2024-04-17 DIAGNOSIS — Z12.31 SCREENING MAMMOGRAM FOR BREAST CANCER: ICD-10-CM

## 2024-04-17 DIAGNOSIS — Z12.31 ENCOUNTER FOR SCREENING MAMMOGRAM FOR BREAST CANCER: ICD-10-CM

## 2024-04-17 DIAGNOSIS — G89.29 CHRONIC MIDLINE LOW BACK PAIN WITH SCIATICA, SCIATICA LATERALITY UNSPECIFIED: ICD-10-CM

## 2024-04-17 DIAGNOSIS — K13.79 MOUTH SORE: ICD-10-CM

## 2024-04-17 DIAGNOSIS — R73.9 ELEVATED BLOOD SUGAR: ICD-10-CM

## 2024-04-17 DIAGNOSIS — E55.9 VITAMIN D DEFICIENCY: ICD-10-CM

## 2024-04-17 DIAGNOSIS — Z00.00 ENCOUNTER FOR ANNUAL WELLNESS EXAM IN MEDICARE PATIENT: Primary | ICD-10-CM

## 2024-04-17 DIAGNOSIS — Z78.0 POSTMENOPAUSAL STATE: ICD-10-CM

## 2024-04-17 DIAGNOSIS — Z13.6 ENCOUNTER FOR SCREENING FOR CARDIOVASCULAR DISORDERS: ICD-10-CM

## 2024-04-17 DIAGNOSIS — B37.89 CANDIDIASIS OF BREAST: ICD-10-CM

## 2024-04-17 DIAGNOSIS — M54.2 NECK PAIN: ICD-10-CM

## 2024-04-17 DIAGNOSIS — Z23 IMMUNIZATION DUE: ICD-10-CM

## 2024-04-17 RX ORDER — NYSTATIN AND TRIAMCINOLONE ACETONIDE 100000; 1 [USP'U]/G; MG/G
1 OINTMENT TOPICAL 2 TIMES DAILY
Qty: 30 G | Refills: 2 | Status: SHIPPED | OUTPATIENT
Start: 2024-04-17

## 2024-04-17 NOTE — LETTER
April 17, 2024     Patient: Yolanda Sharp   YOB: 1940   Date of Visit: 4/17/2024       To Whom It May Concern:    It is my medical opinion that Yolanda alvarenga would greatly benefit from getting rid of all of her carpet and cleaning her blinds/draperies due to her chronic moderate to severe allergies.         Sincerely,        Kathleen Brown MD    CC: No Recipients

## 2024-04-17 NOTE — PROGRESS NOTES
The ABCs of the Annual Wellness Visit  Subsequent Medicare Wellness Visit    Subjective    Yolanda Sharp is a 83 y.o. female who presents for a Subsequent Medicare Wellness Visit.    The following portions of the patient's history were reviewed and   updated as appropriate: allergies, current medications, past family history, past medical history, past social history, past surgical history, and problem list.    Compared to one year ago, the patient feels her physical   health is the same.    Compared to one year ago, the patient feels her mental   health is worse she is grieving over death of her son Dec 29 2023 to cancer    Recent Hospitalizations:  She was not admitted to the hospital during the last year.       Advance Care Planning  Advance Directive is not on file.  ACP discussion was held with the patient during this visit. Patient does not have an advance directive, information provided.    Does the patient have evidence of cognitive impairment? No    Aspirin is not on active medication list.  Aspirin use is not indicated based on review of current medical condition/s. Risk of harm outweighs potential benefits.  .    Patient Active Problem List   Diagnosis    Migraines    Osteoporosis    Sleep apnea    Depression    Hypertension    Arthritis of left hip    Pacemaker    Benign paroxysmal positional vertigo due to bilateral vestibular disorder    Gastroesophageal reflux disease without esophagitis    Restless legs syndrome (RLS)    Chronic bilateral low back pain with bilateral sciatica       Current Medical Providers:  Patient Care Team:  Kathleen rBown MD as PCP - General (Family Medicine)  Augusto Bar MD as Consulting Physician (Sleep Medicine)  Dedrick Greenwood MD as Consulting Physician (Otolaryngology)  Raulito Carter MD as Consulting Physician (Pulmonary Disease)  Prosper Rivera DO as Consulting Physician (Cardiac Electrophysiology)    No opioid medication identified on  "active medication list. I have reviewed chart for other potential  high risk medication/s and harmful drug interactions in the elderly.             Objective    Vitals:    24 1516   BP: 115/73   BP Location: Right arm   Patient Position: Sitting   Pulse: 84   SpO2: 93%   Weight: 95.3 kg (210 lb)   Height: 159.4 cm (62.76\")     Estimated body mass index is 37.49 kg/m² as calculated from the following:    Height as of this encounter: 159.4 cm (62.76\").    Weight as of this encounter: 95.3 kg (210 lb).                     HEALTH RISK ASSESSMENT    Smoking Status:  Social History     Tobacco Use   Smoking Status Former    Current packs/day: 0.00    Average packs/day: 1 pack/day for 33.0 years (33.0 ttl pk-yrs)    Types: Cigarettes    Start date:     Quit date:     Years since quittin.3   Smokeless Tobacco Never     Alcohol Consumption:  Social History     Substance and Sexual Activity   Alcohol Use Never     Fall Risk Screen:    ABBYADI Fall Risk Assessment was completed, and patient is at LOW risk for falls.Assessment completed on:2024    Depression Screenin/17/2024     3:26 PM   PHQ-2/PHQ-9 Depression Screening   Little Interest or Pleasure in Doing Things 0-->not at all   Feeling Down, Depressed or Hopeless 0-->not at all   PHQ-9: Brief Depression Severity Measure Score 0       Health Habits and Functional and Cognitive Screenin/17/2024     3:26 PM   Functional & Cognitive Status   Do you have difficulty preparing food and eating? No   Do you have difficulty bathing yourself, getting dressed or grooming yourself? No   Do you have difficulty using the toilet? No   Do you have difficulty moving around from place to place? No   Do you have trouble with steps or getting out of a bed or a chair? No   Current Diet Well Balanced Diet   Dental Exam Up to date   Eye Exam Up to date   Exercise (times per week) 0 times per week   Current Exercises Include No Regular Exercise   Do you " need help using the phone?  No   Are you deaf or do you have serious difficulty hearing?  No   Do you need help to go to places out of walking distance? No   Do you need help shopping? No   Do you need help preparing meals?  No   Do you need help with housework?  No   Do you need help with laundry? No   Do you need help taking your medications? No   Do you need help managing money? No   Do you ever drive or ride in a car without wearing a seat belt? No   Have you felt unusual stress, anger or loneliness in the last month? No   Who do you live with? Alone   If you need help, do you have trouble finding someone available to you? No   Have you been bothered in the last four weeks by sexual problems? No   Do you have difficulty concentrating, remembering or making decisions? No       Age-appropriate Screening Schedule:  Refer to the list below for future screening recommendations based on patient's age, sex and/or medical conditions. Orders for these recommended tests are listed in the plan section. The patient has been provided with a written plan.    Health Maintenance   Topic Date Due    RSV Vaccine - Adults (1 - 1-dose 60+ series) Never done    DXA SCAN  07/01/2024    TDAP/TD VACCINES (1 - Tdap) 07/25/2024 (Originally 12/16/1959)    ZOSTER VACCINE (1 of 2) 07/25/2024 (Originally 12/16/1990)    INFLUENZA VACCINE  08/01/2024    LIPID PANEL  10/18/2024    BMI FOLLOWUP  10/24/2024    ANNUAL WELLNESS VISIT  04/17/2025    COVID-19 Vaccine  Completed    Pneumococcal Vaccine 65+  Discontinued              Outpatient Medications Prior to Visit   Medication Sig Dispense Refill    Calcium-Vitamin D-Vitamin K 500-100-40 MG-UNT-MCG chewable tablet Calcium for Women 500-100-40 mg-unit-mcg oral tablet,chewable chew 2 tablets by oral route daily   Active      carbidopa-levodopa (SINEMET)  MG per tablet TAKE 1 TABLET BY MOUTH EVERY NIGHT AT BEDTIME 90 tablet 0    diphenhydrAMINE (BENADRYL) 25 mg capsule Take 1 capsule by mouth  Every 6 (Six) Hours As Needed for Itching.      Eliquis 5 MG tablet tablet       famotidine (PEPCID) 40 MG tablet TAKE 1 TABLET BY MOUTH TWICE DAILY 180 tablet 0    FLUoxetine (PROzac) 20 MG capsule TAKE 1 CAPSULE BY MOUTH TWICE DAILY 180 capsule 0    meclizine (ANTIVERT) 12.5 MG tablet TAKE ONE TABLET BY MOUTH THREE TIMES DAILY AS NEEDED FOR dizziness 40 tablet 0    melatonin 3 MG tablet melatonin 3 mg oral tablet take 1 tablet by oral route daily   Active      multivitamin with minerals tablet tablet multivitamin oral capsule take 1 capsule by oral route daily   Active      ondansetron (Zofran) 4 MG tablet Take 1 tablet by mouth Every 8 (Eight) Hours As Needed for Nausea or Vomiting. 20 tablet 0    spironolactone (ALDACTONE) 25 MG tablet TAKE 1 TABLET BY MOUTH ONCE DAILY 90 tablet 0     No facility-administered medications prior to visit.       CMS Preventative Services Quick Reference  Risk Factors Identified During Encounter  Chronic Pain: OTC analgesics as needed. Proper dosing schedule discussed.   Depression/Dysphoria: Current medication is effective, no change recommended  Hearing Problem:  she wears hearing aids  Immunizations Discussed/Encouraged: Tdap, Shingrix, COVID19, and RSV (Respiratory Syncytial Virus)  Inactivity/Sedentary: Patient was advised to exercise at least 150 minutes a week per CDC recommendations.  Dental Screening Recommended  Vision Screening Recommended  The above risks/problems have been discussed with the patient.  Pertinent information has been shared with the patient in the After Visit Summary.  An After Visit Summary and PPPS were made available to the patient.    Follow Up:   Next Medicare Wellness visit to be scheduled in 1 year.       Additional E&M Note during same encounter follows:  Patient has multiple medical problems which are significant and separately identifiable that require additional work above and beyond the Medicare Wellness Visit.         Yolanda Sharp presents  to [facility].    Chief Complaint:  LBP        Subjective   History of Present Illness:  HPI    She presents with chronic lower back pain and knee arthritic pain, pain is worse with ambulation, pain can be severe, and she defers all pain medicatiosn.   She has tried steroid injections in spine without benefit.  Opioids cause her severe constipation.  She has LE weakness, uses a hooverround and a walker. She has had chronic back pain for years, tried and failed pain management with steroid shots in spine and PT.    She is not on tylenol.  She can't take NSAIDs    She has varicose veins with chronic bruising in R medial posterior ankle s/p traumatic fall.     LE swelling is well controlled on spironolactone     She presents with chronic allergies and currently has runny nose, mild cough, and sore throat, no fever, ongoing for years, she has carpet that is over 20 years old in her house and would benefit from getting the carpet replaced and getting rid of all drapes in her home.  Her allergies are all year long including wintertime.  She states she always has a sore throat from postnasal drip.  She also states that she uses cough drops every day all day long.  No sinus pain but she has intermittent headaches and feels tired all the time.      She is being seen also for her chronic hypertension, current cardiac medication regimen includes a diuretic spironolactone.  Ms. Sharp does not check her blood pressure other than at her clinic appointments.  She is tolerating the medication well without side effects.  Compliance with treatment has been good; she takes her medication as directed, maintains her diet but unable to exercise due to LBP and LE weakness, she follows up as directed.      She has h/o SSS and s/p cardiac pacemaker placement and her pacemaker.  She sees cardiologist DR Rivera. She is on eliquis and tolerates meds well, no SI GI bleeding, mild bruising.      She has sleep  "apnea and is stable on her bipap, sees Dr Madrid and is pending retesting.     She has chronic restless leg syndrome and is currently stable on her carbidopa /evodopa at nighttime, tolerates medication well and she is doing well on this medication.      Her chronic constipation is stable on her probiotics with meals     Her chronic depression is worse.  She is grieving over the loss of several friends and family including her son who passed away from leukemia in December.  D she is coping the best she can.  She is on Prozac and she says this helps make her functional.  She does not have any anxiety, suicidal or homicidal ideation.  She is sleeping well on melatonin and Benadryl at nighton     Her chronic GERD is stable on Pepcid PRN, no issues recently.                Result Review   The following data was reviewed by Kathleen Brown MD on 04/17/2024.  Lab Results   Component Value Date    WBC 8.20 10/18/2023    HGB 14.6 10/18/2023    HCT 42.6 10/18/2023    MCV 84.5 10/18/2023     10/18/2023     Lab Results   Component Value Date    GLUCOSE 96 10/18/2023    BUN 16 10/18/2023    CREATININE 0.83 10/18/2023    EGFR 69.6 10/18/2023    BCR 19.3 10/18/2023    K 4.5 10/18/2023    CO2 27.7 10/18/2023    CALCIUM 9.9 10/18/2023    ALBUMIN 4.3 10/18/2023    BILITOT 0.3 10/18/2023    AST 21 10/18/2023    ALT 15 10/18/2023     Lab Results   Component Value Date    CHOL 164 10/18/2023    CHLPL 184 10/29/2019    TRIG 70 10/18/2023    HDL 57 10/18/2023    LDL 93 10/18/2023     Lab Results   Component Value Date    TSH 3.430 10/18/2023     Lab Results   Component Value Date    HGBA1C 5.80 (H) 10/18/2023     No results found for: \"PSA\"      No results found for: \"GMVM12GZ\"          Assessment and Plan:   Diagnoses and all orders for this visit:    1. Encounter for annual wellness exam in Medicare patient (Primary)    2. Encounter for screening mammogram for breast cancer  Comments:  Mammogram is up-to-date.  Will repeat " screening mammogram in July  Orders:  -     Mammo Screening Digital Tomosynthesis Bilateral With CAD; Future    3. Colon cancer screening  Comments:  She is done with colonoscopies due to age    4. Postmenopausal state  Comments:  Follow-up DEXA screening ordered  Orders:  -     DEXA Bone Density Axial; Future    5. Chronic midline low back pain with sciatica, sciatica laterality unspecified  Comments:  Chronic lower back pain.  Recommend daily stretching and PT.  She defers PT.  Okay to take Tylenol for pain    6. Candidiasis of breast  Comments:  Will start topical nystatin with triamcinolone twice daily until rash resolves  Orders:  -     nystatin-triamcinolone (MYCOLOG) 854863-0.1 UNIT/GM-% ointment; Apply 1 Application topically to the appropriate area as directed 2 (Two) Times a Day.  Dispense: 30 g; Refill: 2    7. Neck pain  Comments:  Will further evaluate with x-ray of the C-spine  Orders:  -     XR Spine Cervical Complete 4 or 5 View; Future    8. Mouth sore  Comments:  Will treat with Magic mouthwash    9. Vitamin D deficiency  Comments:  Stable on vitamin D supplementation.  Will check labs and adjust supplementation pending results  Orders:  -     Vitamin D,25-Hydroxy; Future    10. Elevated blood sugar  Comments:  Will check hemoglobin A1c due to prediabetes status.  Recommend daily exercise and low calorie diet for weight loss  Orders:  -     Hemoglobin A1c; Future    11. Primary hypertension  Comments:  Blood pressure stable and under great control with spironolactone.  No further changes are needed in Tx plan.  Will check kidney function today  Orders:  -     Comprehensive Metabolic Panel; Future  -     CBC (No Diff); Future    12. Encounter for screening for cardiovascular disorders  -     Lipid Panel; Future    13. Immunization due  -     COVID-19 F23 (Pfizer) 12yrs+ (COMIRNATY)    14. Screening mammogram for breast cancer    Other orders  -     Discontinue: MAGIC MOUTHWASH 1/1/1 SUSP  (diphenhydrAMINE HCl-Aluminum & Magnesium Hydroxide-Lidocaine); Swish and spit 5 mL 3 times a day.  Dispense: 150 mL; Refill: 0                    Medications:      Current Outpatient Medications:     Calcium-Vitamin D-Vitamin K 500-100-40 MG-UNT-MCG chewable tablet, Calcium for Women 500-100-40 mg-unit-mcg oral tablet,chewable chew 2 tablets by oral route daily   Active, Disp: , Rfl:     carbidopa-levodopa (SINEMET)  MG per tablet, TAKE 1 TABLET BY MOUTH EVERY NIGHT AT BEDTIME, Disp: 90 tablet, Rfl: 0    diphenhydrAMINE (BENADRYL) 25 mg capsule, Take 1 capsule by mouth Every 6 (Six) Hours As Needed for Itching., Disp: , Rfl:     Eliquis 5 MG tablet tablet, , Disp: , Rfl:     famotidine (PEPCID) 40 MG tablet, TAKE 1 TABLET BY MOUTH TWICE DAILY, Disp: 180 tablet, Rfl: 0    FLUoxetine (PROzac) 20 MG capsule, TAKE 1 CAPSULE BY MOUTH TWICE DAILY, Disp: 180 capsule, Rfl: 0    meclizine (ANTIVERT) 12.5 MG tablet, TAKE ONE TABLET BY MOUTH THREE TIMES DAILY AS NEEDED FOR dizziness, Disp: 40 tablet, Rfl: 0    melatonin 3 MG tablet, melatonin 3 mg oral tablet take 1 tablet by oral route daily   Active, Disp: , Rfl:     multivitamin with minerals tablet tablet, multivitamin oral capsule take 1 capsule by oral route daily   Active, Disp: , Rfl:     ondansetron (Zofran) 4 MG tablet, Take 1 tablet by mouth Every 8 (Eight) Hours As Needed for Nausea or Vomiting., Disp: 20 tablet, Rfl: 0    aluminum-magnesium hydroxide 200-200 MG/5ML suspension, diphenhydrAMINE 12.5 MG/5ML liquid, Lidocaine Viscous HCl 2 % solution, nystatin 100,000 unit/mL suspension, hydrocortisone 10 MG tablet 20 mg, Swish and spit 5 mL 4 (Four) Times a Day., Disp: 200 each, Rfl: 0    nystatin-triamcinolone (MYCOLOG) 751420-7.1 UNIT/GM-% ointment, Apply 1 Application topically to the appropriate area as directed 2 (Two) Times a Day., Disp: 30 g, Rfl: 2    spironolactone (ALDACTONE) 25 MG tablet, TAKE 1 TABLET BY MOUTH ONCE DAILY, Disp: 90 tablet, Rfl: 0  "      Objective   Vital Signs:   /73 (BP Location: Right arm, Patient Position: Sitting)   Pulse 84   Ht 159.4 cm (62.76\")   Wt 95.3 kg (210 lb)   SpO2 93%   BMI 37.49 kg/m²       Physical Exam:  Physical Exam  Vitals and nursing note reviewed.   Constitutional:       General: She is not in acute distress.     Appearance: Normal appearance. She is not ill-appearing, toxic-appearing or diaphoretic.   HENT:      Head: Normocephalic and atraumatic.      Right Ear: Tympanic membrane, ear canal and external ear normal.      Left Ear: Tympanic membrane, ear canal and external ear normal.      Nose: Congestion and rhinorrhea present.      Right Turbinates: Enlarged, swollen and pale.      Left Turbinates: Enlarged, swollen and pale.      Right Sinus: No maxillary sinus tenderness or frontal sinus tenderness.      Left Sinus: No maxillary sinus tenderness or frontal sinus tenderness.      Mouth/Throat:      Mouth: Mucous membranes are moist.      Pharynx: Oropharynx is clear. No oropharyngeal exudate or posterior oropharyngeal erythema.     Eyes:      Extraocular Movements: Extraocular movements intact.      Conjunctiva/sclera: Conjunctivae normal.      Pupils: Pupils are equal, round, and reactive to light.   Cardiovascular:      Rate and Rhythm: Normal rate and regular rhythm.      Heart sounds: Normal heart sounds.   Pulmonary:      Effort: Pulmonary effort is normal.      Breath sounds: Normal breath sounds. No wheezing, rhonchi or rales.   Chest:   Breasts:     Right: Normal.      Left: Normal.       Abdominal:      General: Abdomen is flat.      Palpations: Abdomen is soft. There is no mass.      Tenderness: There is no abdominal tenderness.      Hernia: No hernia is present.       Musculoskeletal:      Cervical back: Neck supple. No rigidity.      Right lower leg: No edema.      Left lower leg: No edema.      Comments: 5 out of 5 musculoskeletal strength in the upper extremity bilaterally with normal " sensory exam in the upper extremity bilaterally.  2/4 brachial radial DTR bilaterally.   Nontender to palpation over the cervical spine.  No muscle spasm noted in the para cervical muscles bilaterally     Lymphadenopathy:      Cervical: No cervical adenopathy.   Skin:     General: Skin is warm and dry.   Neurological:      General: No focal deficit present.      Mental Status: She is alert and oriented to person, place, and time. Mental status is at baseline.   Psychiatric:         Mood and Affect: Mood normal.         Behavior: Behavior normal.         Thought Content: Thought content normal.         Judgment: Judgment normal.                     Review of Systems:  Review of Systems   Constitutional:  Negative for chills, fatigue and fever.   HENT:  Positive for congestion, mouth sores, postnasal drip, rhinorrhea and sore throat. Negative for ear pain.    Eyes:  Negative for blurred vision and double vision.   Respiratory:  Negative for cough, shortness of breath and wheezing.    Cardiovascular:  Positive for leg swelling. Negative for chest pain and palpitations.   Gastrointestinal:  Negative for abdominal pain, blood in stool, constipation, diarrhea, nausea and vomiting.   Musculoskeletal:  Positive for back pain and neck pain.   Skin:  Positive for rash and skin lesions.   Neurological:  Negative for dizziness and headache.   Psychiatric/Behavioral:  Positive for sleep disturbance and depressed mood. Negative for suicidal ideas. The patient is not nervous/anxious.             Follow Up   Return in about 3 months (around 7/17/2024) for Recheck.    Part of this note may be an electronic transcription/translation of spoken language to printed   text using the Dragon Dictation System.            Medical History:  Past Medical History:    Arthritis    Back problem    Bleeding    Bronchitis    Cancer    Headache    Hernia, hiatal    HL (hearing loss)    Hypertension    Osteoporosis    Sleep apnea    Tinnitus     Past  Surgical History:    EYE SURGERY    GALLBLADDER SURGERY    HAND SURGERY    HERNIA REPAIR    KNEE SURGERY    OOPHORECTOMY      Family History   Problem Relation Age of Onset    Arthritis Mother     Hypertension Mother     Diabetes Father     Arthritis Sister     Cancer Sister     Heart failure Sister     Hypertension Sister     Arthritis Brother     Cancer Brother     Heart failure Brother     Hypertension Brother     Kidney disease Brother      Social History     Tobacco Use    Smoking status: Former     Current packs/day: 0.00     Average packs/day: 1 pack/day for 33.0 years (33.0 ttl pk-yrs)     Types: Cigarettes     Start date:      Quit date:      Years since quittin.3    Smokeless tobacco: Never   Substance Use Topics    Alcohol use: Never       Health Maintenance Due   Topic Date Due    RSV Vaccine - Adults (1 - 1-dose 60+ series) Never done    DXA SCAN  2024        Immunization History   Administered Date(s) Administered    COVID-19 (PFIZER) BIVALENT 12+YRS 10/11/2022    COVID-19 (PFIZER) Purple Cap Monovalent 2021, 2021, 10/08/2021    COVID-19 F23 (PFIZER) 12YRS+ (COMIRNATY) 2024    Fluzone High-Dose 65+yrs 10/11/2022, 10/18/2023       Allergies   Allergen Reactions    Theophylline Other (See Comments) and Shortness Of Breath

## 2024-04-18 ENCOUNTER — TELEPHONE (OUTPATIENT)
Dept: FAMILY MEDICINE CLINIC | Age: 84
End: 2024-04-18
Payer: MEDICARE

## 2024-04-18 NOTE — TELEPHONE ENCOUNTER
Caller: [facility] - [place], [place] - [address]  - [phone] [facility] [phone] FX    Relationship: Pharmacy    Best call back number: [phone]    Which medication are you concerned about: MAGIC MOUTHWASH    Who prescribed you this medication: DR. OVALLE    What are your concerns: PHARMACIST LATANYA WOULD LIKE CLARIFICATION ON WHAT INGREDIENTS SHOULD BE IN MAGIC MOUTHWASH. MAY CALL TO ADVISE OR RESEND PRESCRIPTION WITH INGREDIENTS.

## 2024-04-19 DIAGNOSIS — R60.0 BILATERAL LEG EDEMA: ICD-10-CM

## 2024-04-19 RX ORDER — SPIRONOLACTONE 25 MG/1
TABLET ORAL
Qty: 90 TABLET | Refills: 0 | Status: SHIPPED | OUTPATIENT
Start: 2024-04-19

## 2024-05-14 RX ORDER — FAMOTIDINE 40 MG/1
TABLET, FILM COATED ORAL
Qty: 180 TABLET | Refills: 0 | Status: SHIPPED | OUTPATIENT
Start: 2024-05-14

## 2024-05-18 DIAGNOSIS — B37.89 CANDIDIASIS OF BREAST: ICD-10-CM

## 2024-05-20 RX ORDER — NYSTATIN AND TRIAMCINOLONE ACETONIDE 100000; 1 [USP'U]/G; MG/G
OINTMENT TOPICAL
Qty: 30 G | Refills: 2 | Status: SHIPPED | OUTPATIENT
Start: 2024-05-20

## 2024-05-23 ENCOUNTER — TELEPHONE (OUTPATIENT)
Dept: FAMILY MEDICINE CLINIC | Age: 84
End: 2024-05-23
Payer: MEDICARE

## 2024-05-23 NOTE — TELEPHONE ENCOUNTER
1st attempt: Spoke with pt re overdue XR and labs ordered 4/17/24. She wasn't aware she did not need an appt. States she will be in within the next couple days to complete this.

## 2024-06-03 ENCOUNTER — LAB (OUTPATIENT)
Dept: LAB | Facility: HOSPITAL | Age: 84
End: 2024-06-03
Payer: MEDICARE

## 2024-06-03 ENCOUNTER — HOSPITAL ENCOUNTER (OUTPATIENT)
Dept: GENERAL RADIOLOGY | Facility: HOSPITAL | Age: 84
Discharge: HOME | End: 2024-06-03
Payer: MEDICARE

## 2024-06-03 DIAGNOSIS — E55.9 VITAMIN D DEFICIENCY: ICD-10-CM

## 2024-06-03 DIAGNOSIS — I10 PRIMARY HYPERTENSION: ICD-10-CM

## 2024-06-03 DIAGNOSIS — M54.2 NECK PAIN: ICD-10-CM

## 2024-06-03 DIAGNOSIS — R73.9 ELEVATED BLOOD SUGAR: ICD-10-CM

## 2024-06-03 DIAGNOSIS — Z13.6 ENCOUNTER FOR SCREENING FOR CARDIOVASCULAR DISORDERS: ICD-10-CM

## 2024-06-03 LAB
25(OH)D3 SERPL-MCNC: 53.3 NG/ML (ref 30–100)
ALBUMIN SERPL-MCNC: 4 G/DL (ref 3.5–5.2)
ALBUMIN/GLOB SERPL: 1.4 G/DL
ALP SERPL-CCNC: 96 U/L (ref 39–117)
ALT SERPL W P-5'-P-CCNC: 9 U/L (ref 1–33)
ANION GAP SERPL CALCULATED.3IONS-SCNC: 6 MMOL/L (ref 5–15)
AST SERPL-CCNC: 15 U/L (ref 1–32)
BILIRUB SERPL-MCNC: 0.4 MG/DL (ref 0–1.2)
BUN SERPL-MCNC: 16 MG/DL (ref 8–23)
BUN/CREAT SERPL: 20.3 (ref 7–25)
CALCIUM SPEC-SCNC: 9.4 MG/DL (ref 8.6–10.5)
CHLORIDE SERPL-SCNC: 103 MMOL/L (ref 98–107)
CHOLEST SERPL-MCNC: 164 MG/DL (ref 0–200)
CO2 SERPL-SCNC: 28 MMOL/L (ref 22–29)
CREAT SERPL-MCNC: 0.79 MG/DL (ref 0.57–1)
DEPRECATED RDW RBC AUTO: 44.5 FL (ref 37–54)
EGFRCR SERPLBLD CKD-EPI 2021: 73.4 ML/MIN/1.73
ERYTHROCYTE [DISTWIDTH] IN BLOOD BY AUTOMATED COUNT: 13.3 % (ref 12.3–15.4)
GLOBULIN UR ELPH-MCNC: 2.9 GM/DL
GLUCOSE SERPL-MCNC: 90 MG/DL (ref 65–99)
HBA1C MFR BLD: 5.7 % (ref 4.8–5.6)
HCT VFR BLD AUTO: 41.2 % (ref 34–46.6)
HDLC SERPL-MCNC: 56 MG/DL (ref 40–60)
HGB BLD-MCNC: 13.4 G/DL (ref 12–15.9)
LDLC SERPL CALC-MCNC: 93 MG/DL (ref 0–100)
LDLC/HDLC SERPL: 1.65 {RATIO}
MCH RBC QN AUTO: 29.1 PG (ref 26.6–33)
MCHC RBC AUTO-ENTMCNC: 32.5 G/DL (ref 31.5–35.7)
MCV RBC AUTO: 89.4 FL (ref 79–97)
PLATELET # BLD AUTO: 348 10*3/MM3 (ref 140–450)
PMV BLD AUTO: 10 FL (ref 6–12)
POTASSIUM SERPL-SCNC: 4.1 MMOL/L (ref 3.5–5.2)
PROT SERPL-MCNC: 6.9 G/DL (ref 6–8.5)
RBC # BLD AUTO: 4.61 10*6/MM3 (ref 3.77–5.28)
SODIUM SERPL-SCNC: 137 MMOL/L (ref 136–145)
TRIGL SERPL-MCNC: 77 MG/DL (ref 0–150)
VLDLC SERPL-MCNC: 15 MG/DL (ref 5–40)
WBC NRBC COR # BLD AUTO: 9.17 10*3/MM3 (ref 3.4–10.8)

## 2024-06-03 PROCEDURE — 82306 VITAMIN D 25 HYDROXY: CPT

## 2024-06-03 PROCEDURE — 80053 COMPREHEN METABOLIC PANEL: CPT

## 2024-06-03 PROCEDURE — 72050 X-RAY EXAM NECK SPINE 4/5VWS: CPT

## 2024-06-03 PROCEDURE — 85027 COMPLETE CBC AUTOMATED: CPT

## 2024-06-03 PROCEDURE — 83036 HEMOGLOBIN GLYCOSYLATED A1C: CPT

## 2024-06-03 PROCEDURE — 36415 COLL VENOUS BLD VENIPUNCTURE: CPT

## 2024-06-03 PROCEDURE — 80061 LIPID PANEL: CPT

## 2024-06-04 DIAGNOSIS — R93.89 ABNORMAL X-RAY OF NECK: ICD-10-CM

## 2024-06-04 DIAGNOSIS — M54.2 NECK PAIN: Primary | ICD-10-CM

## 2024-06-18 ENCOUNTER — HOSPITAL ENCOUNTER (OUTPATIENT)
Dept: CT IMAGING | Facility: HOSPITAL | Age: 84
Discharge: HOME | End: 2024-06-18
Admitting: FAMILY MEDICINE
Payer: MEDICARE

## 2024-06-18 DIAGNOSIS — M54.2 NECK PAIN: ICD-10-CM

## 2024-06-18 DIAGNOSIS — R93.89 ABNORMAL X-RAY OF NECK: ICD-10-CM

## 2024-06-18 PROCEDURE — 25510000001 IOPAMIDOL PER 1 ML: Performed by: FAMILY MEDICINE

## 2024-06-18 PROCEDURE — 70492 CT SFT TSUE NCK W/O & W/DYE: CPT

## 2024-06-18 RX ADMIN — IOPAMIDOL 100 ML: 755 INJECTION, SOLUTION INTRAVENOUS at 15:30

## 2024-07-11 ENCOUNTER — TELEPHONE (OUTPATIENT)
Dept: FAMILY MEDICINE CLINIC | Age: 84
End: 2024-07-11
Payer: MEDICARE

## 2024-07-11 DIAGNOSIS — R60.0 BILATERAL LEG EDEMA: ICD-10-CM

## 2024-07-11 RX ORDER — APIXABAN 5 MG/1
TABLET, FILM COATED ORAL
Qty: 60 TABLET | OUTPATIENT
Start: 2024-07-11

## 2024-07-11 NOTE — TELEPHONE ENCOUNTER
Caller: Yolanda Sharp    Relationship: Self    Best call back number:     [phone]       Requested Prescriptions:   Requested Prescriptions     Pending Prescriptions Disp Refills    famotidine (PEPCID) 40 MG tablet 180 tablet 0     Sig: Take 1 tablet by mouth 2 (Two) Times a Day.    spironolactone (ALDACTONE) 25 MG tablet 90 tablet 0     Sig: Take 1 tablet by mouth Daily.    carbidopa-levodopa (SINEMET)  MG per tablet 90 tablet 0     Sig: Take 1 tablet by mouth every night at bedtime.    Eliquis 5 MG tablet tablet 60 tablet         Pharmacy where request should be sent: [facility] [address] [facility] [phone] [facility] [phone] FX     Last office visit with prescribing clinician: 4/17/2024   Last telemedicine visit with prescribing clinician: Visit date not found   Next office visit with prescribing clinician: 10/16/2024     Additional details provided by patient: PATIENT IS ASKING FOR MD OVALLE HANDLE THE MEDICATION FOR THE ELIQUIS.     Does the patient have less than a 3 day supply:  [x] Yes  [] No    Would you like a call back once the refill request has been completed: [] Yes [x] No    If the office needs to give you a call back, can they leave a voicemail: [] Yes [x] No    Norberto Beverly   07/11/24 09:23 EDT

## 2024-07-11 NOTE — TELEPHONE ENCOUNTER
Per pt she was feeling weak and had headache for about 4 days, she took Extra Strength Tylenol and it helped. To call back with any other issues. Encouraged pt to drink plenty of fluids. Voices understanding

## 2024-07-11 NOTE — TELEPHONE ENCOUNTER
Caller: Yolanda Sharp    Relationship: Self    Best call back number: [phone]     What orders are you requesting (i.e. lab or imaging): LABS- STATES SHE WOULD LIKE TO HAVE SUGAR CHECKED AS SHE WAS ADVISED SHE WAS PREDIABETIC AND ISN'T FEELING THE BEST.     In what timeframe would the patient need to come in: AS SOON AS POSSIBLE    Where will you receive your lab/imaging services: [facility]    Additional notes: PLEASE CONTACT PATIENT TO LET HER KNOW ONCE PLACED OR IF MD OVALLE WOULD LIKE FOR HER TO BE SEEN FIRST.

## 2024-07-12 RX ORDER — SPIRONOLACTONE 25 MG/1
25 TABLET ORAL DAILY
Qty: 90 TABLET | Refills: 0 | Status: SHIPPED | OUTPATIENT
Start: 2024-07-12

## 2024-07-15 RX ORDER — FAMOTIDINE 40 MG/1
40 TABLET, FILM COATED ORAL 2 TIMES DAILY
Qty: 180 TABLET | Refills: 0 | Status: SHIPPED | OUTPATIENT
Start: 2024-07-15

## 2024-08-03 DIAGNOSIS — R60.0 BILATERAL LEG EDEMA: ICD-10-CM

## 2024-08-05 RX ORDER — SPIRONOLACTONE 25 MG/1
25 TABLET ORAL DAILY
Qty: 90 TABLET | Refills: 0 | OUTPATIENT
Start: 2024-08-05

## 2024-09-24 ENCOUNTER — TELEPHONE (OUTPATIENT)
Dept: FAMILY MEDICINE CLINIC | Age: 84
End: 2024-09-24
Payer: MEDICARE

## 2024-10-28 ENCOUNTER — OFFICE VISIT (OUTPATIENT)
Dept: FAMILY MEDICINE CLINIC | Age: 84
End: 2024-10-28
Payer: MEDICARE

## 2024-10-28 VITALS
HEIGHT: 63 IN | DIASTOLIC BLOOD PRESSURE: 79 MMHG | BODY MASS INDEX: 38.27 KG/M2 | OXYGEN SATURATION: 95 % | SYSTOLIC BLOOD PRESSURE: 136 MMHG | HEART RATE: 71 BPM | WEIGHT: 216 LBS

## 2024-10-28 DIAGNOSIS — G25.81 RESTLESS LEGS SYNDROME (RLS): ICD-10-CM

## 2024-10-28 DIAGNOSIS — Z78.0 MENOPAUSE: ICD-10-CM

## 2024-10-28 DIAGNOSIS — I49.5 SICK SINUS SYNDROME: ICD-10-CM

## 2024-10-28 DIAGNOSIS — R60.0 BILATERAL LEG EDEMA: ICD-10-CM

## 2024-10-28 DIAGNOSIS — H81.13 BENIGN PAROXYSMAL POSITIONAL VERTIGO DUE TO BILATERAL VESTIBULAR DISORDER: ICD-10-CM

## 2024-10-28 DIAGNOSIS — G47.33 OBSTRUCTIVE SLEEP APNEA SYNDROME: ICD-10-CM

## 2024-10-28 DIAGNOSIS — Z95.0 PACEMAKER: ICD-10-CM

## 2024-10-28 DIAGNOSIS — M54.2 NECK PAIN: ICD-10-CM

## 2024-10-28 DIAGNOSIS — F32.A DEPRESSION, UNSPECIFIED DEPRESSION TYPE: ICD-10-CM

## 2024-10-28 DIAGNOSIS — I10 PRIMARY HYPERTENSION: ICD-10-CM

## 2024-10-28 DIAGNOSIS — K21.9 GASTROESOPHAGEAL REFLUX DISEASE WITHOUT ESOPHAGITIS: ICD-10-CM

## 2024-10-28 DIAGNOSIS — R53.83 OTHER FATIGUE: Primary | ICD-10-CM

## 2024-10-28 DIAGNOSIS — J30.9 ALLERGIC RHINITIS, UNSPECIFIED SEASONALITY, UNSPECIFIED TRIGGER: ICD-10-CM

## 2024-10-28 DIAGNOSIS — E55.9 VITAMIN D DEFICIENCY: ICD-10-CM

## 2024-10-28 DIAGNOSIS — Z23 IMMUNIZATION DUE: ICD-10-CM

## 2024-10-28 PROCEDURE — 99214 OFFICE O/P EST MOD 30 MIN: CPT | Performed by: FAMILY MEDICINE

## 2024-10-28 PROCEDURE — 1126F AMNT PAIN NOTED NONE PRSNT: CPT | Performed by: FAMILY MEDICINE

## 2024-10-28 PROCEDURE — G0008 ADMIN INFLUENZA VIRUS VAC: HCPCS | Performed by: FAMILY MEDICINE

## 2024-10-28 PROCEDURE — 3075F SYST BP GE 130 - 139MM HG: CPT | Performed by: FAMILY MEDICINE

## 2024-10-28 PROCEDURE — 91320 SARSCV2 VAC 30MCG TRS-SUC IM: CPT | Performed by: FAMILY MEDICINE

## 2024-10-28 PROCEDURE — 90480 ADMN SARSCOV2 VAC 1/ONLY CMP: CPT | Performed by: FAMILY MEDICINE

## 2024-10-28 PROCEDURE — 90662 IIV NO PRSV INCREASED AG IM: CPT | Performed by: FAMILY MEDICINE

## 2024-10-28 PROCEDURE — 3078F DIAST BP <80 MM HG: CPT | Performed by: FAMILY MEDICINE

## 2024-10-28 RX ORDER — MECLIZINE HCL 12.5 MG 12.5 MG/1
12.5 TABLET ORAL 3 TIMES DAILY PRN
Qty: 40 TABLET | Refills: 0 | Status: SHIPPED | OUTPATIENT
Start: 2024-10-28

## 2024-10-28 RX ORDER — FEXOFENADINE HCL 180 MG/1
180 TABLET ORAL DAILY
Qty: 90 TABLET | Refills: 1 | Status: SHIPPED | OUTPATIENT
Start: 2024-10-28

## 2024-10-28 RX ORDER — SPIRONOLACTONE 25 MG/1
25 TABLET ORAL DAILY
Qty: 90 TABLET | Refills: 1 | Status: SHIPPED | OUTPATIENT
Start: 2024-10-28

## 2024-10-28 RX ORDER — METHOCARBAMOL 500 MG/1
500 TABLET, FILM COATED ORAL 3 TIMES DAILY PRN
Qty: 40 TABLET | Refills: 1 | Status: SHIPPED | OUTPATIENT
Start: 2024-10-28

## 2024-10-28 RX ORDER — FAMOTIDINE 40 MG/1
40 TABLET, FILM COATED ORAL 2 TIMES DAILY
Qty: 180 TABLET | Refills: 0 | Status: SHIPPED | OUTPATIENT
Start: 2024-10-28

## 2024-10-28 RX ORDER — CARBIDOPA AND LEVODOPA 25; 100 MG/1; MG/1
1 TABLET ORAL
Qty: 90 TABLET | Refills: 1 | Status: SHIPPED | OUTPATIENT
Start: 2024-10-28

## 2024-10-28 RX ORDER — PSEUDOEPHEDRINE HCL 30 MG
100 TABLET ORAL
COMMUNITY

## 2024-10-28 NOTE — PROGRESS NOTES
Yolanda Sharp presents to [facility].    Chief Complaint:  fatigue    Subjective     History of Present Illness:  HPI    She complains of fatigue, and she feels like she is sleeping ok with her bipap and she takes benadryl and melatonin at night.   MD is Dr Madrid. She just has no energy.  She is on a MVI daily    She presents with ongoing chronic lower back pain and knee arthritic pain, pain is worse with ambulation, pain is intermittent and can be severe, and she defers pain medication.  Knee xrays show she is bone on bone.    She has tried steroid injections in spine without benefit.  Opioids cause her severe constipation.  She has LE weakness, uses a hooverround and a walker. She has had chronic back pain for years, tried and failed pain management with steroid shots in spine and PT.    She takes tylenol XS prn headaches.  She can't take NSAIDs     She has varicose veins with LE swelling is well controlled on spironolactone     She presents with chronic allergies and her symptoms are worse.  She currently just takes Benadryl at night which helps with her sleep.  She denies sinus pain or headaches.       She presents with chronic hypertension, her current cardiac medication regimen includes a diuretic spironolactone.  She is tolerating the medication well without side effects.  Compliance with treatment has been good; she takes her medication as directed, maintains her diet but unable to exercise due to LBP and LE weakness, she follows up as directed.       She has h/o SSS and s/p cardiac pacemaker placement and her pacemaker.  She sees cardiologist Dr Rivera. She is on eliquis and tolerates meds well, no SI GI bleeding, mild bruising.       She has chronic restless leg syndrome and she remains stable on her carbidopa /levodopa at nighttime, tolerates medication well and she is it really helps     She presents with chronic constipation remains stable on her probiotics with  meals     Her chronic depression is stable and well controlled, she has been able to wean her prozac down to once a day from 2 x per day.    She is grieving over her son who passed away from leukemia. She does not have any anxiety, suicidal or homicidal ideation.  She is sleeping well on melatonin and Benadryl at night.  Denies SI/HI.  Coping better     Her chronic GERD is stable on Pepcid PRN, she is to avoid spicy and acidic foods in her diet     She complains of neck pain with L sided tension headaches, pain with ROM of her neck, can be tight feeling, pain is moderate to severe, comes and goes, improved with a topical margarita she got from her neighbor.       XR SPINE CERVICAL COMPLETE 4 OR 5 VW-   Date of Exam: 6/3/2024 7:49 AM  Findings:  There is osseous demineralization. There are multilevel degenerative  changes about the cervical spine. On the oblique views, there is  suggested narrowing of the intervertebral foramina on the right at C3-4,  C4-5 and C5-6 which in part may be positional and on the left at C2-3,  C4-5, C5-6 and to lesser degree C6-7. The relationship of the lateral  pillars of C1 with respect to C2 does not appear unusual. There is  bilateral facet arthropathy. There is some fullness to the prevertebral  soft tissues in the lower cervical area of uncertain significance.  IMPRESSION:  1.  Multilevel degenerative change.  2.  Some soft tissue fullness to the prevertebral soft tissues of  uncertain significance. Depending on clinical findings CT neck might be  warranted to reassess  Electronically Signed By-Ken Rouse MD On:6/3/2024 12:17 PM         Result Review   The following data was reviewed by Kathleen Brown MD on 10/28/2024.  Lab Results   Component Value Date    WBC 9.17 06/03/2024    HGB 13.4 06/03/2024    HCT 41.2 06/03/2024    MCV 89.4 06/03/2024     06/03/2024     Lab Results   Component Value Date    GLUCOSE 90 06/03/2024    BUN 16 06/03/2024    CREATININE 0.79 06/03/2024  "    06/03/2024    K 4.1 06/03/2024     06/03/2024    CALCIUM 9.4 06/03/2024    PROTEINTOT 6.9 06/03/2024    ALBUMIN 4.0 06/03/2024    ALT 9 06/03/2024    AST 15 06/03/2024    ALKPHOS 96 06/03/2024    BILITOT 0.4 06/03/2024    GLOB 2.9 06/03/2024    AGRATIO 1.4 06/03/2024    BCR 20.3 06/03/2024    ANIONGAP 6.0 06/03/2024    EGFR 73.4 06/03/2024     Lab Results   Component Value Date    CHOL 164 06/03/2024    CHLPL 184 10/29/2019    TRIG 77 06/03/2024    HDL 56 06/03/2024    LDL 93 06/03/2024     Lab Results   Component Value Date    TSH 3.430 10/18/2023     Lab Results   Component Value Date    HGBA1C 5.70 (H) 06/03/2024     No results found for: \"PSA\"      Lab Results   Component Value Date    MLDK43IP 53.3 06/03/2024               Assessment and Plan:   Diagnoses and all orders for this visit:    1. Other fatigue (Primary)  Comments:  Will check labs to rule out thyroid disorder (history of hypothyroid disease in the past) and anemia.  Continue B vitamin supplementation  Orders:  -     TSH Rfx On Abnormal To Free T4; Future  -     CBC No Differential; Future  -     Iron Profile; Future  -     Vitamin B12; Future    2. Vitamin D deficiency  Comments:  Continue vitamin D supplementation will check labs and adjust Tx plan pending results  Orders:  -     Vitamin D 25 hydroxy; Future    3. Benign paroxysmal positional vertigo due to bilateral vestibular disorder  Comments:  Overall stable status post PT treatment, she takes meclizine as needed for flareups and needs a refill on this med today  Orders:  -     meclizine (ANTIVERT) 12.5 MG tablet; Take 1 tablet by mouth 3 (Three) Times a Day As Needed for Dizziness.  Dispense: 40 tablet; Refill: 0    4. Bilateral leg edema  Comments:  Well-controlled on spironolactone to 25 mg daily.  Orders:  -     spironolactone (ALDACTONE) 25 MG tablet; Take 1 tablet by mouth Daily.  Dispense: 90 tablet; Refill: 1    5. Sick sinus syndrome  Comments:  Doing well status post " pacemaker placement    6. Pacemaker  Comments:  Follow-up with cardiology as directed    7. Primary hypertension  Comments:  Blood pressure stable and well-controlled.  No changes needed current meds or treatment plan.  Avoid NA in diet  Orders:  -     spironolactone (ALDACTONE) 25 MG tablet; Take 1 tablet by mouth Daily.  Dispense: 90 tablet; Refill: 1    8. Gastroesophageal reflux disease without esophagitis  Comments:  Avoid spicy and acidic foods.  She is stable on Pepcid and tolerates med well  Orders:  -     famotidine (PEPCID) 40 MG tablet; Take 1 tablet by mouth 2 (Two) Times a Day.  Dispense: 180 tablet; Refill: 0    9. Restless legs syndrome (RLS)  Comments:  Overall stable and well-controlled on carbidopa levodopa.  Orders:  -     carbidopa-levodopa (SINEMET)  MG per tablet; Take 1 tablet by mouth every night at bedtime.  Dispense: 90 tablet; Refill: 1    10. Obstructive sleep apnea syndrome  Comments:  Currently on BiPAP and stable and doing well    11. Depression, unspecified depression type  Comments:  Overall stable and well-controlled with current treatment plan  Orders:  -     FLUoxetine (PROzac) 20 MG capsule; Take 1 capsule by mouth Daily.  Dispense: 90 capsule; Refill: 0    12. Neck pain  Comments:  recc PT and she defers, wants to try topical pain med.  I will also start her on a muscle relaxer and recommend heat as needed flareups  Orders:  -     methocarbamol (ROBAXIN) 500 MG tablet; Take 1 tablet by mouth 3 (Three) Times a Day As Needed for Muscle Spasms (of the neck).  Dispense: 40 tablet; Refill: 1  -     Diclofenac Sodium (VOLTAREN) 1 % gel gel; Apply 4 g topically to the appropriate area as directed 4 (Four) Times a Day As Needed (arthritis) for up to 30 days. Apply only to posterior neck  Dispense: 100 g; Refill: 2    13. Allergic rhinitis, unspecified seasonality, unspecified trigger  Comments:  Worse.  Will add Allegra 180 mg daily    14. Immunization due  Comments:  Recommend  "Tdap, Shingrix, RSV, COVID and flu vaccination  Orders:  -     COVID-19 (Pfizer) 12yrs+ (COMIRNATY)  -     Fluzone High-Dose 65+yrs (2390-6838)    15. Menopause  -     DEXA Bone Density Axial; Future    Other orders  -     fexofenadine (Allegra Allergy) 180 MG tablet; Take 1 tablet by mouth Daily.  Dispense: 90 tablet; Refill: 1              Objective     Medications:  Current Outpatient Medications   Medication Instructions    aluminum-magnesium hydroxide 200-200 MG/5ML suspension, diphenhydrAMINE 12.5 MG/5ML liquid, Lidocaine Viscous HCl 2 % solution, nystatin 100,000 unit/mL suspension, hydrocortisone 10 MG tablet 20 mg 5 mL, Swish & Spit, 4 Times Daily    Calcium-Vitamin D-Vitamin K 500-100-40 MG-UNT-MCG chewable tablet Calcium for Women 500-100-40 mg-unit-mcg oral tablet,chewable chew 2 tablets by oral route daily   Active    carbidopa-levodopa (SINEMET)  MG per tablet 1 tablet, Oral, Every Night at Bedtime    Diclofenac Sodium (VOLTAREN) 4 g, Topical, 4 Times Daily PRN, Apply only to posterior neck    diphenhydrAMINE (BENADRYL) 25 mg, Every 6 Hours PRN    docusate sodium 100 mg    Eliquis 5 MG tablet tablet No dose, route, or frequency recorded.    famotidine (PEPCID) 40 mg, Oral, 2 Times Daily    fexofenadine (ALLEGRA ALLERGY) 180 mg, Oral, Daily    FLUoxetine (PROZAC) 20 mg, Oral, Daily    meclizine (ANTIVERT) 12.5 mg, Oral, 3 Times Daily PRN    melatonin 3 MG tablet melatonin 3 mg oral tablet take 1 tablet by oral route daily   Active    methocarbamol (ROBAXIN) 500 mg, Oral, 3 Times Daily PRN    multivitamin with minerals tablet tablet multivitamin oral capsule take 1 capsule by oral route daily   Active    ondansetron (ZOFRAN) 4 mg, Oral, Every 8 Hours PRN    spironolactone (ALDACTONE) 25 mg, Oral, Daily        Vital Signs:   /79 (BP Location: Right arm, Patient Position: Sitting, Cuff Size: Large Adult)   Pulse 71   Ht 159.4 cm (62.76\")   Wt 98 kg (216 lb)   SpO2 95%   BMI 38.56 kg/m²   "   Class 2 Severe Obesity (BMI >=35 and <=39.9). Obesity-related health conditions include the following: obstructive sleep apnea, hypertension, and dyslipidemias. Obesity is unchanged. BMI is is above average; BMI management plan is completed. We discussed portion control and increasing exercise.       Physical Exam:  Physical Exam  Vitals and nursing note reviewed.   Constitutional:       General: She is not in acute distress.     Appearance: Normal appearance. She is obese. She is not ill-appearing, toxic-appearing or diaphoretic.   HENT:      Head: Normocephalic and atraumatic.      Right Ear: Tympanic membrane, ear canal and external ear normal.      Left Ear: Tympanic membrane, ear canal and external ear normal.      Nose: Nose normal. No congestion or rhinorrhea.      Mouth/Throat:      Pharynx: Oropharynx is clear. No oropharyngeal exudate or posterior oropharyngeal erythema.   Eyes:      Conjunctiva/sclera: Conjunctivae normal.   Cardiovascular:      Rate and Rhythm: Normal rate.      Pulses: Normal pulses.   Pulmonary:      Effort: Pulmonary effort is normal. No respiratory distress.      Breath sounds: Normal breath sounds. No stridor. No wheezing, rhonchi or rales.   Musculoskeletal:         General: Normal range of motion.      Cervical back: Normal range of motion and neck supple. No rigidity.   Lymphadenopathy:      Cervical: No cervical adenopathy.   Skin:     General: Skin is warm and dry.      Capillary Refill: Capillary refill takes less than 2 seconds.   Neurological:      Mental Status: She is alert and oriented to person, place, and time.      Motor: Weakness present.      Gait: Gait abnormal.   Psychiatric:         Mood and Affect: Mood normal.         Behavior: Behavior normal.         Thought Content: Thought content normal.         Judgment: Judgment normal.           Review of Systems:  Review of Systems   Constitutional:  Positive for fatigue. Negative for chills and fever.   HENT:   Positive for congestion and postnasal drip. Negative for ear pain, sinus pressure and sore throat.    Eyes:  Negative for blurred vision and double vision.   Respiratory:  Negative for cough, shortness of breath and wheezing.    Cardiovascular:  Negative for chest pain and palpitations.   Gastrointestinal:  Positive for constipation and GERD. Negative for abdominal pain, blood in stool, diarrhea, nausea and vomiting.   Musculoskeletal:  Positive for arthralgias and neck pain.   Skin:  Negative for rash.   Neurological:  Positive for dizziness. Negative for headache.   Psychiatric/Behavioral:  Positive for depressed mood. Negative for sleep disturbance and suicidal ideas. The patient is not nervous/anxious.               Follow Up   Return in about 3 months (around 1/28/2025), or if symptoms worsen or fail to improve, for Recheck.    Part of this note may be an electronic transcription/translation of spoken language to printed   text using the Dragon Dictation System.            Medical History:  Medications Discontinued During This Encounter   Medication Reason    nystatin-triamcinolone (MYCOLOG) 885730-8.1 UNIT/GM-% ointment *Therapy completed    meclizine (ANTIVERT) 12.5 MG tablet Reorder    FLUoxetine (PROzac) 20 MG capsule Reorder    famotidine (PEPCID) 40 MG tablet Reorder    spironolactone (ALDACTONE) 25 MG tablet Reorder    carbidopa-levodopa (SINEMET)  MG per tablet Reorder      Past Medical History:    Arthritis    Back problem    Bleeding    Bronchitis    Cancer    Headache    Hernia, hiatal    HL (hearing loss)    Hypertension    Osteoporosis    Sleep apnea    Tinnitus     Past Surgical History:    EYE SURGERY    GALLBLADDER SURGERY    HAND SURGERY    HERNIA REPAIR    KNEE SURGERY    OOPHORECTOMY      Family History   Problem Relation Age of Onset    Arthritis Mother     Hypertension Mother     Diabetes Father     Arthritis Sister     Cancer Sister     Heart failure Sister     Hypertension Sister      Arthritis Brother     Cancer Brother     Heart failure Brother     Hypertension Brother     Kidney disease Brother      Social History     Tobacco Use    Smoking status: Former     Current packs/day: 0.00     Average packs/day: 1 pack/day for 33.0 years (33.0 ttl pk-yrs)     Types: Cigarettes     Start date:      Quit date:      Years since quittin.8     Passive exposure: Never    Smokeless tobacco: Never   Substance Use Topics    Alcohol use: Never       Health Maintenance Due   Topic Date Due    TDAP/TD VACCINES (1 - Tdap) Never done    ZOSTER VACCINE (1 of 2) Never done    RSV Vaccine - Adults (1 - 1-dose 75+ series) Never done    DXA SCAN  2024        Immunization History   Administered Date(s) Administered    COVID-19 (PFIZER) 12YRS+ (COMIRNATY) 2024, 10/28/2024    COVID-19 (PFIZER) BIVALENT 12+YRS 10/11/2022    COVID-19 (PFIZER) Purple Cap Monovalent 2021, 2021, 10/08/2021    Fluzone High-Dose 65+YRS 10/28/2024    Fluzone High-Dose 65+yrs 10/11/2022, 10/18/2023       Allergies   Allergen Reactions    Theophylline Other (See Comments) and Shortness Of Breath

## 2024-11-11 DIAGNOSIS — M54.2 NECK PAIN: ICD-10-CM

## 2024-11-11 RX ORDER — METHOCARBAMOL 500 MG/1
500 TABLET, FILM COATED ORAL 3 TIMES DAILY PRN
Qty: 40 TABLET | Refills: 1 | Status: SHIPPED | OUTPATIENT
Start: 2024-11-11

## 2024-11-22 ENCOUNTER — TELEPHONE (OUTPATIENT)
Dept: FAMILY MEDICINE CLINIC | Age: 84
End: 2024-11-22
Payer: MEDICARE

## 2024-12-07 DIAGNOSIS — M54.2 NECK PAIN: ICD-10-CM

## 2024-12-09 RX ORDER — METHOCARBAMOL 500 MG/1
500 TABLET, FILM COATED ORAL 3 TIMES DAILY PRN
Qty: 40 TABLET | Refills: 1 | Status: SHIPPED | OUTPATIENT
Start: 2024-12-09

## 2025-01-01 DIAGNOSIS — M54.2 NECK PAIN: ICD-10-CM

## 2025-01-02 RX ORDER — METHOCARBAMOL 500 MG/1
500 TABLET, FILM COATED ORAL 3 TIMES DAILY PRN
Qty: 40 TABLET | Refills: 1 | Status: SHIPPED | OUTPATIENT
Start: 2025-01-02

## 2025-01-15 DIAGNOSIS — M54.2 NECK PAIN: ICD-10-CM

## 2025-01-16 RX ORDER — METHOCARBAMOL 500 MG/1
500 TABLET, FILM COATED ORAL 3 TIMES DAILY PRN
Qty: 40 TABLET | Refills: 1 | Status: SHIPPED | OUTPATIENT
Start: 2025-01-16

## 2025-01-31 ENCOUNTER — TELEPHONE (OUTPATIENT)
Dept: FAMILY MEDICINE CLINIC | Age: 85
End: 2025-01-31
Payer: MEDICARE

## 2025-02-02 DIAGNOSIS — M54.2 NECK PAIN: ICD-10-CM

## 2025-02-03 RX ORDER — METHOCARBAMOL 500 MG/1
500 TABLET, FILM COATED ORAL 3 TIMES DAILY PRN
Qty: 40 TABLET | Refills: 1 | Status: SHIPPED | OUTPATIENT
Start: 2025-02-03

## 2025-03-10 DIAGNOSIS — K21.9 GASTROESOPHAGEAL REFLUX DISEASE WITHOUT ESOPHAGITIS: ICD-10-CM

## 2025-03-11 RX ORDER — FAMOTIDINE 40 MG/1
40 TABLET, FILM COATED ORAL 2 TIMES DAILY
Qty: 180 TABLET | Refills: 0 | Status: SHIPPED | OUTPATIENT
Start: 2025-03-11

## 2025-03-11 NOTE — TELEPHONE ENCOUNTER
Rx Refill Note  Requested Prescriptions     Pending Prescriptions Disp Refills    famotidine (PEPCID) 40 MG tablet [Pharmacy Med Name: famotidine 40 mg tablet] 180 tablet 0     Sig: TAKE 1 TABLET BY MOUTH TWICE DAILY      Last office visit with prescribing clinician: 10/28/2024     Next office visit with prescribing clinician: 4/28/2025    famotidine (PEPCID) 40 MG tablet (10/28/2024)  #180 Dr Jam Restrepo, LPN  03/11/25, 11:09 EDT

## 2025-04-13 DIAGNOSIS — I10 PRIMARY HYPERTENSION: ICD-10-CM

## 2025-04-13 DIAGNOSIS — R60.0 BILATERAL LEG EDEMA: ICD-10-CM

## 2025-04-13 DIAGNOSIS — G25.81 RESTLESS LEGS SYNDROME (RLS): ICD-10-CM

## 2025-04-14 RX ORDER — CARBIDOPA AND LEVODOPA 25; 100 MG/1; MG/1
1 TABLET ORAL
Qty: 90 TABLET | Refills: 0 | Status: SHIPPED | OUTPATIENT
Start: 2025-04-14

## 2025-04-14 RX ORDER — SPIRONOLACTONE 25 MG/1
25 TABLET ORAL DAILY
Qty: 90 TABLET | Refills: 0 | Status: SHIPPED | OUTPATIENT
Start: 2025-04-14

## 2025-04-21 DIAGNOSIS — Z12.31 SCREENING MAMMOGRAM FOR BREAST CANCER: Primary | ICD-10-CM

## 2025-04-28 ENCOUNTER — HOSPITAL ENCOUNTER (OUTPATIENT)
Dept: BONE DENSITY | Facility: HOSPITAL | Age: 85
Discharge: HOME | End: 2025-04-28
Payer: MEDICARE

## 2025-04-28 ENCOUNTER — HOSPITAL ENCOUNTER (OUTPATIENT)
Dept: MAMMOGRAPHY | Facility: HOSPITAL | Age: 85
Discharge: HOME | End: 2025-04-28
Payer: MEDICARE

## 2025-04-28 ENCOUNTER — OFFICE VISIT (OUTPATIENT)
Dept: FAMILY MEDICINE CLINIC | Age: 85
End: 2025-04-28
Payer: MEDICARE

## 2025-04-28 VITALS
DIASTOLIC BLOOD PRESSURE: 58 MMHG | HEIGHT: 61 IN | BODY MASS INDEX: 39.27 KG/M2 | SYSTOLIC BLOOD PRESSURE: 138 MMHG | OXYGEN SATURATION: 97 % | WEIGHT: 208 LBS | HEART RATE: 74 BPM

## 2025-04-28 DIAGNOSIS — K21.9 GASTROESOPHAGEAL REFLUX DISEASE WITHOUT ESOPHAGITIS: ICD-10-CM

## 2025-04-28 DIAGNOSIS — D49.2 ATYPICAL SQUAMOPROLIFERATIVE SKIN LESION: ICD-10-CM

## 2025-04-28 DIAGNOSIS — Z23 ENCOUNTER FOR IMMUNIZATION: ICD-10-CM

## 2025-04-28 DIAGNOSIS — Z00.00 ENCOUNTER FOR ANNUAL WELLNESS EXAM IN MEDICARE PATIENT: Primary | ICD-10-CM

## 2025-04-28 DIAGNOSIS — R60.0 BILATERAL LEG EDEMA: ICD-10-CM

## 2025-04-28 DIAGNOSIS — Z78.0 POSTMENOPAUSAL STATE: ICD-10-CM

## 2025-04-28 DIAGNOSIS — M25.562 CHRONIC PAIN OF BOTH KNEES: ICD-10-CM

## 2025-04-28 DIAGNOSIS — Z78.0 MENOPAUSE: ICD-10-CM

## 2025-04-28 DIAGNOSIS — G25.81 RESTLESS LEGS SYNDROME (RLS): ICD-10-CM

## 2025-04-28 DIAGNOSIS — J40 BRONCHITIS: ICD-10-CM

## 2025-04-28 DIAGNOSIS — Z12.31 SCREENING MAMMOGRAM FOR BREAST CANCER: ICD-10-CM

## 2025-04-28 DIAGNOSIS — Z12.11 COLON CANCER SCREENING: ICD-10-CM

## 2025-04-28 DIAGNOSIS — H81.13 BENIGN PAROXYSMAL POSITIONAL VERTIGO DUE TO BILATERAL VESTIBULAR DISORDER: ICD-10-CM

## 2025-04-28 DIAGNOSIS — G89.29 CHRONIC BILATERAL LOW BACK PAIN WITH BILATERAL SCIATICA: ICD-10-CM

## 2025-04-28 DIAGNOSIS — G43.709 CHRONIC MIGRAINE WITHOUT AURA WITHOUT STATUS MIGRAINOSUS, NOT INTRACTABLE: ICD-10-CM

## 2025-04-28 DIAGNOSIS — H91.93 BILATERAL HEARING LOSS, UNSPECIFIED HEARING LOSS TYPE: ICD-10-CM

## 2025-04-28 DIAGNOSIS — M54.41 CHRONIC BILATERAL LOW BACK PAIN WITH BILATERAL SCIATICA: ICD-10-CM

## 2025-04-28 DIAGNOSIS — G89.29 CHRONIC PAIN OF BOTH KNEES: ICD-10-CM

## 2025-04-28 DIAGNOSIS — G47.33 OBSTRUCTIVE SLEEP APNEA SYNDROME: ICD-10-CM

## 2025-04-28 DIAGNOSIS — Z12.31 ENCOUNTER FOR SCREENING MAMMOGRAM FOR BREAST CANCER: ICD-10-CM

## 2025-04-28 DIAGNOSIS — M54.42 CHRONIC BILATERAL LOW BACK PAIN WITH BILATERAL SCIATICA: ICD-10-CM

## 2025-04-28 DIAGNOSIS — I10 PRIMARY HYPERTENSION: ICD-10-CM

## 2025-04-28 DIAGNOSIS — M25.561 CHRONIC PAIN OF BOTH KNEES: ICD-10-CM

## 2025-04-28 DIAGNOSIS — F32.4 MAJOR DEPRESSIVE DISORDER WITH SINGLE EPISODE, IN PARTIAL REMISSION: ICD-10-CM

## 2025-04-28 PROCEDURE — 77063 BREAST TOMOSYNTHESIS BI: CPT

## 2025-04-28 PROCEDURE — 77080 DXA BONE DENSITY AXIAL: CPT

## 2025-04-28 PROCEDURE — 77067 SCR MAMMO BI INCL CAD: CPT

## 2025-04-28 RX ORDER — AZITHROMYCIN 250 MG/1
TABLET, FILM COATED ORAL
Qty: 6 TABLET | Refills: 0 | Status: SHIPPED | OUTPATIENT
Start: 2025-04-28

## 2025-04-28 RX ORDER — SPIRONOLACTONE 25 MG/1
50 TABLET ORAL DAILY
Qty: 180 TABLET | Refills: 1 | Status: SHIPPED | OUTPATIENT
Start: 2025-04-28

## 2025-04-28 RX ORDER — ALBUTEROL SULFATE 1.25 MG/3ML
1 SOLUTION RESPIRATORY (INHALATION) EVERY 6 HOURS PRN
Qty: 60 EACH | Refills: 2 | Status: SHIPPED | OUTPATIENT
Start: 2025-04-28

## 2025-04-28 NOTE — ASSESSMENT & PLAN NOTE
Orders:    Comprehensive metabolic panel; Future    Lipid panel; Future    spironolactone (ALDACTONE) 25 MG tablet; Take 2 tablets by mouth Daily.

## 2025-04-28 NOTE — ASSESSMENT & PLAN NOTE
Hypertension is stable and controlled  Continue current treatment regimen.  Dietary sodium restriction.  Weight loss.  Blood pressure will be reassessed in 6 months.    Orders:    Comprehensive metabolic panel; Future    Lipid panel; Future    spironolactone (ALDACTONE) 25 MG tablet; Take 2 tablets by mouth Daily.

## 2025-04-28 NOTE — PROGRESS NOTES
The ABCs of the Annual Wellness Visit  Medicare Wellness Visit    Subjective    Yolanda Sharp is a 84 y.o. female who presents for a Medicare Wellness Visit.    The following portions of the patient's history were reviewed and   updated as appropriate: allergies, current medications, past family history, past medical history, past social history, past surgical history, and problem list.    Compared to one year ago, the patient feels her physical   health is worse due to chronic headaches with her allergies, chronic knee and low back pain, more LE weakness.    Compared to one year ago, the patient feels her mental   health is the same.    Recent Hospitalizations:  She was not admitted to the hospital during the last year.       Advance Care Planning  Advance Directive is not on file.  ACP discussion was held with the patient during this visit. Patient does not have an advance directive, information provided.    Does the patient have evidence of cognitive impairment? No    Aspirin is not on active medication list.  Aspirin use is not indicated based on review of current medical condition/s. Risk of harm outweighs potential benefits.  .    Patient Active Problem List   Diagnosis   • Migraines   • Osteoporosis   • Sleep apnea   • Depression   • Hypertension   • Arthritis of left hip   • Pacemaker   • Benign paroxysmal positional vertigo due to bilateral vestibular disorder   • Gastroesophageal reflux disease without esophagitis   • Restless legs syndrome (RLS)   • Chronic bilateral low back pain with bilateral sciatica       Current Medical Providers:  Patient Care Team:  Kathleen Brown MD as PCP - General (Family Medicine)  Augusto Bar MD as Consulting Physician (Sleep Medicine)  Dedrick Greenwood MD as Consulting Physician (Otolaryngology)  Raulito Carter MD as Consulting Physician (Pulmonary Disease)  Prosper Rivera DO as Consulting Physician (Cardiac Electrophysiology)    No opioid  "medication identified on active medication list. I have reviewed chart for other potential  high risk medication/s and harmful drug interactions in the elderly.             Objective    Vitals:    25 1414   BP: 138/58   BP Location: Right arm   Patient Position: Sitting   Cuff Size: Large Adult   Pulse: 74   SpO2: 97%   Weight: 94.3 kg (208 lb)   Height: 154.9 cm (60.98\")   PainSc: 8    PainLoc: Back     Estimated body mass index is 39.32 kg/m² as calculated from the following:    Height as of this encounter: 154.9 cm (60.98\").    Weight as of this encounter: 94.3 kg (208 lb).    Class 2 Severe Obesity (BMI >=35 and <=39.9). Obesity-related health conditions include the following: obstructive sleep apnea, hypertension, dyslipidemias, and GERD. Obesity is worsening. BMI is is above average; BMI management plan is completed. We discussed portion control and increasing exercise.  H/O gastric bipass surgery      Gait and Balance Evaluation: Loss of Balance and uses a motorized wheelchair          HEALTH RISK ASSESSMENT    Smoking Status:  Social History     Tobacco Use   Smoking Status Former   • Current packs/day: 0.00   • Average packs/day: 1 pack/day for 33.0 years (33.0 ttl pk-yrs)   • Types: Cigarettes   • Start date:    • Quit date:    • Years since quittin.3   • Passive exposure: Never   Smokeless Tobacco Never     Alcohol Consumption:  Social History     Substance and Sexual Activity   Alcohol Use Never     Fall Risk Screen:    STEADI Fall Risk Assessment was completed, and patient is at LOW risk for falls.Assessment completed on:2025    Depression Screenin/28/2025     2:20 PM   PHQ-2/PHQ-9 Depression Screening   Little interest or pleasure in doing things Not at all   Feeling down, depressed, or hopeless Not at all       Health Habits and Functional and Cognitive Screenin/28/2025     2:20 PM   Functional & Cognitive Status   Do you have difficulty preparing food and " eating? No   Do you have difficulty bathing yourself, getting dressed or grooming yourself? No   Do you have difficulty using the toilet? No   Do you have difficulty moving around from place to place? No   Do you have trouble with steps or getting out of a bed or a chair? No   Current Diet Limited Junk Food   Dental Exam Not up to date   Eye Exam Not up to date   Exercise (times per week) 0 times per week   Current Exercises Include No Regular Exercise   Do you need help using the phone?  No   Are you deaf or do you have serious difficulty hearing?  No   Do you need help to go to places out of walking distance? Yes   Do you need help shopping? Yes   Do you need help preparing meals?  Yes   Do you need help with housework?  Yes   Do you need help with laundry? Yes   Do you need help taking your medications? No   Do you need help managing money? No   Do you ever drive or ride in a car without wearing a seat belt? No   Have you felt unusual stress, anger or loneliness in the last month? No   Who do you live with? Alone   If you need help, do you have trouble finding someone available to you? No   Have you been bothered in the last four weeks by sexual problems? No   Do you have difficulty concentrating, remembering or making decisions? No       Visual Acuity:             Age-appropriate Screening Schedule:  Refer to the list below for future screening recommendations based on patient's age, sex and/or medical conditions. Orders for these recommended tests are listed in the plan section. The patient has been provided with a written plan.    Health Maintenance   Topic Date Due   • DXA SCAN  07/01/2024   • TDAP/TD VACCINES (1 - Tdap) 10/25/2025 (Originally 12/16/1959)   • ZOSTER VACCINE (1 of 2) 10/25/2025 (Originally 12/16/1990)   • COVID-19 Vaccine (7 - 2024-25 season) 10/28/2025 (Originally 4/28/2025)   • RSV Vaccine - Adults (1 - 1-dose 75+ series) 04/28/2026 (Originally 12/16/2013)   • INFLUENZA VACCINE  07/01/2025    • ANNUAL WELLNESS VISIT  04/28/2026   • Pneumococcal Vaccine 50+  Discontinued              Outpatient Medications Prior to Visit   Medication Sig Dispense Refill   • aluminum-magnesium hydroxide 200-200 MG/5ML suspension, diphenhydrAMINE 12.5 MG/5ML liquid, Lidocaine Viscous HCl 2 % solution, nystatin 100,000 unit/mL suspension, hydrocortisone 10 MG tablet 20 mg Swish and spit 5 mL 4 (Four) Times a Day. 200 each 0   • Calcium-Vitamin D-Vitamin K 500-100-40 MG-UNT-MCG chewable tablet Calcium for Women 500-100-40 mg-unit-mcg oral tablet,chewable chew 2 tablets by oral route daily   Active     • carbidopa-levodopa (SINEMET)  MG per tablet TAKE 1 TABLET BY MOUTH EVERY NIGHT AT BEDTIME 90 tablet 0   • diphenhydrAMINE (BENADRYL) 25 mg capsule Take 1 capsule by mouth Every 6 (Six) Hours As Needed for Itching.     • docusate sodium 100 MG capsule Take 1 capsule by mouth.     • Eliquis 5 MG tablet tablet      • famotidine (PEPCID) 40 MG tablet TAKE 1 TABLET BY MOUTH TWICE DAILY 180 tablet 0   • fexofenadine (Allegra Allergy) 180 MG tablet Take 1 tablet by mouth Daily. 90 tablet 1   • FLUoxetine (PROzac) 20 MG capsule Take 1 capsule by mouth Daily. 90 capsule 0   • meclizine (ANTIVERT) 12.5 MG tablet Take 1 tablet by mouth 3 (Three) Times a Day As Needed for Dizziness. 40 tablet 0   • melatonin 3 MG tablet melatonin 3 mg oral tablet take 1 tablet by oral route daily   Active     • methocarbamol (ROBAXIN) 500 MG tablet TAKE 1 TABLET BY MOUTH THREE TIMES DAILY AS NEEDED FOR MUSCLE SPASMS 40 tablet 1   • multivitamin with minerals tablet tablet multivitamin oral capsule take 1 capsule by oral route daily   Active     • ondansetron (Zofran) 4 MG tablet Take 1 tablet by mouth Every 8 (Eight) Hours As Needed for Nausea or Vomiting. 20 tablet 0   • spironolactone (ALDACTONE) 25 MG tablet TAKE 1 TABLET BY MOUTH ONCE DAILY 90 tablet 0     No facility-administered medications prior to visit.       CMS Preventative Services  Quick Reference  Risk Factors Identified During Encounter  Chronic Pain: Home exercise plan outlined.  OTC analgesics as needed. Proper dosing schedule discussed.   Depression/Dysphoria: Current medication is effective, no change recommended  Hearing Problem:  recc hearing testing  Immunizations Discussed/Encouraged: Tdap, Shingrix, COVID19, and RSV (Respiratory Syncytial Virus)  Inactivity/Sedentary: Patient was advised to exercise at least 150 minutes a week per CDC recommendations.  Dental Screening Recommended  Vision Screening Recommended  The above risks/problems have been discussed with the patient.  Pertinent information has been shared with the patient in the After Visit Summary.  An After Visit Summary and PPPS were made available to the patient.    Follow Up:   Next Medicare Wellness visit to be scheduled in 1 year.       Additional E&M Note during same encounter follows:  Patient has multiple medical problems which are significant and separately identifiable that require additional work above and beyond the Medicare Wellness Visit.         Yolanda Sharp presents to [facility].    Chief Complaint: Neck and back pain, blood pressure follow-up        Subjective   History of Present Illness:  HPI    She presents with chronic allergies and her symptoms continue to be moderate to severe, today she is blowing out green mucus.  She states her carpet has mold in it and she is wanting her carpet replaced, mold onset after her hot water heater leaked.  Same carpet for 16 years.  She currently just takes Benadryl at night which helps with her sleep.  She denies sinus pain or headaches.       She presents with chronic hypertension, well-controlled on current  medication a diuretic spironolactone.  She is tolerating the medication well without side effects.  Compliance with treatment has been good; she takes her medication as directed, maintains her diet but unable to exercise due to  LBP and LE weakness, she follows up as directed.       She presents with h/o SSS and s/p cardiac pacemaker placement and her pacemaker.  She sees cardiologist Dr Rivera. She is on eliquis and tolerates meds well, no SI GI bleeding, mild bruising.       She presents with chronic restless leg syndrome and she remains stable on her carbidopa /levodopa at nighttime, tolerates medication well and she is it really helps     She presents with history of chronic constipation remains stable on her probiotics with meals     She presents with chronic depression and feels like she is overall stable and coping better.  She has been grieving over her son who passed away from leukemia. She does not have any anxiety, suicidal or homicidal ideation.  She is sleeping well on melatonin and Benadryl at night.  Denies SI/HI.     She presents with chronic GERD is stable on Pepcid PRN, she is to avoid spicy and acidic foods in her diet    She also presents with chronic neck pain with L sided tension headaches, pain with ROM of her neck, can be tight feeling, pain is moderate to severe, comes and goes, improved with a topical margarita she got from her neighbor.    XR SPINE CERVICAL COMPLETE 4 OR 5 VW-   Date of Exam: 6/3/2024 IMPRESSION:  1.  Multilevel degenerative change.  2.  Some soft tissue fullness to the prevertebral soft tissues of  uncertain significance. Depending on clinical findings CT neck might be warranted to reassess    She presents with chronic low back pain and knee arthritic pain, pain can be made worse with ambulation, pain is intermittent and fluctuates in severity. She defers pain medication.  Knee xrays show she is bone on bone.    She has tried steroid injections in spine without benefit.  Opioids cause her severe constipation.  She has LE weakness, uses a hooverround and a walker. She has had chronic back pain for years, tried and failed pain management with steroid shots in spine and PT.    She takes tylenol XS prn  "headaches.  She can't take NSAIDs        Result Review   The following data was reviewed by Kathleen Brown MD on 04/28/2025.  Lab Results   Component Value Date    WBC 9.17 06/03/2024    HGB 13.4 06/03/2024    HCT 41.2 06/03/2024    MCV 89.4 06/03/2024     06/03/2024     Lab Results   Component Value Date    GLUCOSE 90 06/03/2024    BUN 16 06/03/2024    CREATININE 0.79 06/03/2024     06/03/2024    K 4.1 06/03/2024     06/03/2024    CALCIUM 9.4 06/03/2024    PROTEINTOT 6.9 06/03/2024    ALBUMIN 4.0 06/03/2024    ALT 9 06/03/2024    AST 15 06/03/2024    ALKPHOS 96 06/03/2024    BILITOT 0.4 06/03/2024    GLOB 2.9 06/03/2024    AGRATIO 1.4 06/03/2024    BCR 20.3 06/03/2024    ANIONGAP 6.0 06/03/2024    EGFR 73.4 06/03/2024     Lab Results   Component Value Date    CHOL 164 06/03/2024    CHLPL 184 10/29/2019    TRIG 77 06/03/2024    HDL 56 06/03/2024    LDL 93 06/03/2024     Lab Results   Component Value Date    TSH 3.430 10/18/2023     Lab Results   Component Value Date    HGBA1C 5.70 (H) 06/03/2024     No results found for: \"PSA\"  No results found for: \"IRON\"   Lab Results   Component Value Date    CVRH48CB 53.3 06/03/2024             Assessment and Plan:   Assessment & Plan  Encounter for annual wellness exam in Medicare patient         Encounter for screening mammogram for breast cancer  Screening mammogram performed today       Encounter for immunization  Recc Tdap, shingrix, covid and RSV vaccines       Colon cancer screening  Done due to age       Postmenopausal state  Screening dexa done today       Primary hypertension  Hypertension is stable and controlled  Continue current treatment regimen.  Dietary sodium restriction.  Weight loss.  Blood pressure will be reassessed in 6 months.    Orders:  •  Comprehensive metabolic panel; Future  •  Lipid panel; Future  •  spironolactone (ALDACTONE) 25 MG tablet; Take 2 tablets by mouth Daily.    Major depressive disorder with single episode, in " partial remission  No acute issues, she is stable and doing well on prozac,          Chronic migraine without aura without status migrainosus, not intractable  Ongoing,                  Obstructive sleep apnea syndrome  Stable on cpap       Benign paroxysmal positional vertigo due to bilateral vestibular disorder  Stable with prn meclizine     Gastroesophageal reflux disease without esophagitis  Stable on pepcid 40 mg bid       Restless legs syndrome (RLS)  Stable on sinemet, she tolerates med well       Chronic bilateral low back pain with bilateral sciatica  Improved with robaxin and OTC tylenol.       Chronic pain of both knees  Chronic pain, on OTC tylenol, wants ortho referral  Orders:  •  Ambulatory Referral to Orthopedic Surgery    Bilateral hearing loss, unspecified hearing loss type  referral placed to have her hearing retested and hearing aids checked out on her scalp  Orders:  •  Ambulatory Referral to Audiology    Bronchitis    Orders:  •  albuterol (ACCUNEB) 1.25 MG/3ML nebulizer solution; Take 3 mL by nebulization Every 6 (Six) Hours As Needed for Shortness of Air.  •  azithromycin (Zithromax Z-Eduard) 250 MG tablet; Take 2 tablets by mouth on day 1, then 1 tablet daily on days 2-5    Bilateral leg edema  Will increase her spironolactone to 50 mg daily  Orders:  •  spironolactone (ALDACTONE) 25 MG tablet; Take 2 tablets by mouth Daily.    Atypical squamoproliferative skin lesion  Referral placed for further workup of the suspicious lesion squamous cell Orders:  •  Ambulatory Referral to Dermatology                     Medications:      Current Outpatient Medications:   •  aluminum-magnesium hydroxide 200-200 MG/5ML suspension, diphenhydrAMINE 12.5 MG/5ML liquid, Lidocaine Viscous HCl 2 % solution, nystatin 100,000 unit/mL suspension, hydrocortisone 10 MG tablet 20 mg, Swish and spit 5 mL 4 (Four) Times a Day., Disp: 200 each, Rfl: 0  •  Calcium-Vitamin D-Vitamin K 500-100-40 MG-UNT-MCG chewable tablet,  Calcium for Women 500-100-40 mg-unit-mcg oral tablet,chewable chew 2 tablets by oral route daily   Active, Disp: , Rfl:   •  carbidopa-levodopa (SINEMET)  MG per tablet, TAKE 1 TABLET BY MOUTH EVERY NIGHT AT BEDTIME, Disp: 90 tablet, Rfl: 0  •  diphenhydrAMINE (BENADRYL) 25 mg capsule, Take 1 capsule by mouth Every 6 (Six) Hours As Needed for Itching., Disp: , Rfl:   •  docusate sodium 100 MG capsule, Take 1 capsule by mouth., Disp: , Rfl:   •  Eliquis 5 MG tablet tablet, , Disp: , Rfl:   •  famotidine (PEPCID) 40 MG tablet, TAKE 1 TABLET BY MOUTH TWICE DAILY, Disp: 180 tablet, Rfl: 0  •  fexofenadine (Allegra Allergy) 180 MG tablet, Take 1 tablet by mouth Daily., Disp: 90 tablet, Rfl: 1  •  FLUoxetine (PROzac) 20 MG capsule, Take 1 capsule by mouth Daily., Disp: 90 capsule, Rfl: 0  •  meclizine (ANTIVERT) 12.5 MG tablet, Take 1 tablet by mouth 3 (Three) Times a Day As Needed for Dizziness., Disp: 40 tablet, Rfl: 0  •  melatonin 3 MG tablet, melatonin 3 mg oral tablet take 1 tablet by oral route daily   Active, Disp: , Rfl:   •  methocarbamol (ROBAXIN) 500 MG tablet, TAKE 1 TABLET BY MOUTH THREE TIMES DAILY AS NEEDED FOR MUSCLE SPASMS, Disp: 40 tablet, Rfl: 1  •  multivitamin with minerals tablet tablet, multivitamin oral capsule take 1 capsule by oral route daily   Active, Disp: , Rfl:   •  ondansetron (Zofran) 4 MG tablet, Take 1 tablet by mouth Every 8 (Eight) Hours As Needed for Nausea or Vomiting., Disp: 20 tablet, Rfl: 0  •  spironolactone (ALDACTONE) 25 MG tablet, Take 2 tablets by mouth Daily., Disp: 180 tablet, Rfl: 1  •  albuterol (ACCUNEB) 1.25 MG/3ML nebulizer solution, Take 3 mL by nebulization Every 6 (Six) Hours As Needed for Shortness of Air., Disp: 60 each, Rfl: 2  •  azithromycin (Zithromax Z-Eduard) 250 MG tablet, Take 2 tablets by mouth on day 1, then 1 tablet daily on days 2-5, Disp: 6 tablet, Rfl: 0       Objective   Vital Signs:   /58 (BP Location: Right arm, Patient Position:  "Sitting, Cuff Size: Large Adult)   Pulse 74   Ht 154.9 cm (60.98\")   Wt 94.3 kg (208 lb)   SpO2 97%   BMI 39.32 kg/m²       Physical Exam:  Physical Exam  Vitals and nursing note reviewed.   Constitutional:       General: She is not in acute distress.     Appearance: Normal appearance. She is not ill-appearing, toxic-appearing or diaphoretic.   HENT:      Head: Normocephalic and atraumatic.      Right Ear: Tympanic membrane, ear canal and external ear normal.      Left Ear: Tympanic membrane, ear canal and external ear normal.      Nose: No congestion or rhinorrhea.      Mouth/Throat:      Mouth: Mucous membranes are moist.      Pharynx: Oropharynx is clear. No oropharyngeal exudate or posterior oropharyngeal erythema.   Eyes:      Extraocular Movements: Extraocular movements intact.      Conjunctiva/sclera: Conjunctivae normal.      Pupils: Pupils are equal, round, and reactive to light.   Cardiovascular:      Rate and Rhythm: Normal rate and regular rhythm.      Heart sounds: Normal heart sounds.   Pulmonary:      Effort: Pulmonary effort is normal.      Breath sounds: Normal breath sounds. No wheezing, rhonchi or rales.   Abdominal:      General: Abdomen is flat.      Palpations: Abdomen is soft. There is no mass.      Tenderness: There is no abdominal tenderness.      Hernia: No hernia is present.   Musculoskeletal:      Cervical back: Neck supple. No rigidity.      Right lower leg: No edema.      Left lower leg: No edema.   Lymphadenopathy:      Cervical: No cervical adenopathy.   Skin:     General: Skin is warm and dry.          Neurological:      General: No focal deficit present.      Mental Status: She is alert and oriented to person, place, and time. Mental status is at baseline.      Motor: Weakness present.      Gait: Gait abnormal.   Psychiatric:         Mood and Affect: Mood normal.         Behavior: Behavior normal.         Thought Content: Thought content normal.         Judgment: Judgment " normal.                     Review of Systems:  Review of Systems   Constitutional:  Negative for chills, fatigue and fever.   HENT:  Negative for ear pain, sinus pressure and sore throat.    Eyes:  Negative for blurred vision and double vision.   Respiratory:  Negative for cough, shortness of breath and wheezing.    Cardiovascular:  Positive for leg swelling. Negative for chest pain and palpitations.   Gastrointestinal:  Negative for abdominal pain, blood in stool, constipation, diarrhea, nausea and vomiting.   Musculoskeletal:  Positive for back pain and neck pain.   Skin:  Negative for rash.   Neurological:  Negative for dizziness and headache.   Psychiatric/Behavioral:  Negative for sleep disturbance, suicidal ideas and depressed mood. The patient is not nervous/anxious.             Follow Up   Return in about 6 months (around 10/28/2025) for Recheck.    Part of this note may be an electronic transcription/translation of spoken language to printed   text using the Dragon Dictation System.            Medical History:  Past Medical History:   • Arthritis   • Back problem   • Bleeding   • Bronchitis   • Cancer   • Headache   • Hernia, hiatal   • HL (hearing loss)   • Hypertension   • Osteoporosis   • Sleep apnea   • Tinnitus     Past Surgical History:   • EYE SURGERY   • GALLBLADDER SURGERY   • HAND SURGERY   • HERNIA REPAIR   • KNEE SURGERY   • OOPHORECTOMY      Family History   Problem Relation Age of Onset   • Arthritis Mother    • Hypertension Mother    • Diabetes Father    • Arthritis Sister    • Cancer Sister    • Heart failure Sister    • Hypertension Sister    • Arthritis Brother    • Cancer Brother    • Heart failure Brother    • Hypertension Brother    • Kidney disease Brother      Social History     Tobacco Use   • Smoking status: Former     Current packs/day: 0.00     Average packs/day: 1 pack/day for 33.0 years (33.0 ttl pk-yrs)     Types: Cigarettes     Start date: 1954     Quit date: 1987     Years  since quittin.3     Passive exposure: Never   • Smokeless tobacco: Never   Substance Use Topics   • Alcohol use: Never       Health Maintenance Due   Topic Date Due   • DXA SCAN  2024        Immunization History   Administered Date(s) Administered   • COVID-19 (PFIZER) 12YRS+ (COMIRNATY) 2024, 10/28/2024   • COVID-19 (PFIZER) BIVALENT 12+YRS 10/11/2022   • COVID-19 (PFIZER) Purple Cap Monovalent 2021, 2021, 10/08/2021   • Fluzone (or Fluarix & Flulaval for VFC) >6mos 2021   • Fluzone High-Dose 65+YRS 10/28/2024   • Fluzone High-Dose 65+yrs 10/11/2022, 10/18/2023       Allergies   Allergen Reactions   • Theophylline Other (See Comments) and Shortness Of Breath

## 2025-04-29 ENCOUNTER — RESULTS FOLLOW-UP (OUTPATIENT)
Dept: FAMILY MEDICINE CLINIC | Age: 85
End: 2025-04-29
Payer: MEDICARE

## 2025-05-07 ENCOUNTER — LAB (OUTPATIENT)
Dept: LAB | Facility: HOSPITAL | Age: 85
End: 2025-05-07
Payer: MEDICARE

## 2025-05-07 ENCOUNTER — OFFICE VISIT (OUTPATIENT)
Dept: ORTHOPEDIC SURGERY | Facility: CLINIC | Age: 85
End: 2025-05-07
Payer: MEDICARE

## 2025-05-07 VITALS
DIASTOLIC BLOOD PRESSURE: 80 MMHG | HEART RATE: 77 BPM | HEIGHT: 61 IN | WEIGHT: 225 LBS | BODY MASS INDEX: 42.48 KG/M2 | SYSTOLIC BLOOD PRESSURE: 141 MMHG | OXYGEN SATURATION: 94 %

## 2025-05-07 DIAGNOSIS — M81.0 AGE-RELATED OSTEOPOROSIS WITHOUT CURRENT PATHOLOGICAL FRACTURE: Primary | ICD-10-CM

## 2025-05-07 DIAGNOSIS — E55.9 VITAMIN D DEFICIENCY: ICD-10-CM

## 2025-05-07 DIAGNOSIS — M25.561 RIGHT KNEE PAIN, UNSPECIFIED CHRONICITY: Primary | ICD-10-CM

## 2025-05-07 DIAGNOSIS — M25.562 LEFT KNEE PAIN, UNSPECIFIED CHRONICITY: ICD-10-CM

## 2025-05-07 DIAGNOSIS — R53.83 OTHER FATIGUE: ICD-10-CM

## 2025-05-07 DIAGNOSIS — I10 PRIMARY HYPERTENSION: ICD-10-CM

## 2025-05-07 DIAGNOSIS — E66.01 OBESITY, MORBID, BMI 40.0-49.9: ICD-10-CM

## 2025-05-07 DIAGNOSIS — M17.0 PRIMARY OSTEOARTHRITIS OF KNEES, BILATERAL: ICD-10-CM

## 2025-05-07 LAB
25(OH)D3 SERPL-MCNC: 49.2 NG/ML (ref 30–100)
ALBUMIN SERPL-MCNC: 4 G/DL (ref 3.5–5.2)
ALBUMIN/GLOB SERPL: 1.2 G/DL
ALP SERPL-CCNC: 99 U/L (ref 39–117)
ALT SERPL W P-5'-P-CCNC: 12 U/L (ref 1–33)
ANION GAP SERPL CALCULATED.3IONS-SCNC: 12.2 MMOL/L (ref 5–15)
AST SERPL-CCNC: 20 U/L (ref 1–32)
BILIRUB SERPL-MCNC: 0.3 MG/DL (ref 0–1.2)
BUN SERPL-MCNC: 13 MG/DL (ref 8–23)
BUN/CREAT SERPL: 15.7 (ref 7–25)
CALCIUM SPEC-SCNC: 9.7 MG/DL (ref 8.6–10.5)
CHLORIDE SERPL-SCNC: 99 MMOL/L (ref 98–107)
CHOLEST SERPL-MCNC: 181 MG/DL (ref 0–200)
CO2 SERPL-SCNC: 24.8 MMOL/L (ref 22–29)
CREAT SERPL-MCNC: 0.83 MG/DL (ref 0.57–1)
DEPRECATED RDW RBC AUTO: 39.3 FL (ref 37–54)
EGFRCR SERPLBLD CKD-EPI 2021: 68.8 ML/MIN/1.73
ERYTHROCYTE [DISTWIDTH] IN BLOOD BY AUTOMATED COUNT: 13.2 % (ref 12.3–15.4)
GLOBULIN UR ELPH-MCNC: 3.4 GM/DL
GLUCOSE SERPL-MCNC: 135 MG/DL (ref 65–99)
HCT VFR BLD AUTO: 38.8 % (ref 34–46.6)
HDLC SERPL-MCNC: 68 MG/DL (ref 40–60)
HGB BLD-MCNC: 13.5 G/DL (ref 12–15.9)
IRON 24H UR-MRATE: 34 MCG/DL (ref 37–145)
IRON SATN MFR SERPL: 8 % (ref 20–50)
LDLC SERPL CALC-MCNC: 103 MG/DL (ref 0–100)
LDLC/HDLC SERPL: 1.52 {RATIO}
MCH RBC QN AUTO: 28.7 PG (ref 26.6–33)
MCHC RBC AUTO-ENTMCNC: 34.8 G/DL (ref 31.5–35.7)
MCV RBC AUTO: 82.6 FL (ref 79–97)
PLATELET # BLD AUTO: 401 10*3/MM3 (ref 140–450)
PMV BLD AUTO: 10.1 FL (ref 6–12)
POTASSIUM SERPL-SCNC: 4.3 MMOL/L (ref 3.5–5.2)
PROT SERPL-MCNC: 7.4 G/DL (ref 6–8.5)
RBC # BLD AUTO: 4.7 10*6/MM3 (ref 3.77–5.28)
SODIUM SERPL-SCNC: 136 MMOL/L (ref 136–145)
TIBC SERPL-MCNC: 453 MCG/DL (ref 298–536)
TRANSFERRIN SERPL-MCNC: 304 MG/DL (ref 200–360)
TRIGL SERPL-MCNC: 49 MG/DL (ref 0–150)
TSH SERPL DL<=0.05 MIU/L-ACNC: 1.65 UIU/ML (ref 0.27–4.2)
VIT B12 BLD-MCNC: 424 PG/ML (ref 211–946)
VLDLC SERPL-MCNC: 10 MG/DL (ref 5–40)
WBC NRBC COR # BLD AUTO: 10.88 10*3/MM3 (ref 3.4–10.8)

## 2025-05-07 PROCEDURE — 82306 VITAMIN D 25 HYDROXY: CPT

## 2025-05-07 PROCEDURE — 84443 ASSAY THYROID STIM HORMONE: CPT

## 2025-05-07 PROCEDURE — 36415 COLL VENOUS BLD VENIPUNCTURE: CPT

## 2025-05-07 PROCEDURE — 84466 ASSAY OF TRANSFERRIN: CPT

## 2025-05-07 PROCEDURE — 85027 COMPLETE CBC AUTOMATED: CPT

## 2025-05-07 PROCEDURE — 82607 VITAMIN B-12: CPT

## 2025-05-07 PROCEDURE — 80053 COMPREHEN METABOLIC PANEL: CPT

## 2025-05-07 PROCEDURE — 83540 ASSAY OF IRON: CPT

## 2025-05-07 PROCEDURE — 80061 LIPID PANEL: CPT

## 2025-05-07 RX ORDER — LIDOCAINE HYDROCHLORIDE 10 MG/ML
5 INJECTION, SOLUTION INFILTRATION; PERINEURAL
Status: COMPLETED | OUTPATIENT
Start: 2025-05-07 | End: 2025-05-07

## 2025-05-07 RX ORDER — TRIAMCINOLONE ACETONIDE 40 MG/ML
40 INJECTION, SUSPENSION INTRA-ARTICULAR; INTRAMUSCULAR
Status: COMPLETED | OUTPATIENT
Start: 2025-05-07 | End: 2025-05-07

## 2025-05-07 RX ADMIN — LIDOCAINE HYDROCHLORIDE 5 ML: 10 INJECTION, SOLUTION INFILTRATION; PERINEURAL at 15:10

## 2025-05-07 RX ADMIN — TRIAMCINOLONE ACETONIDE 40 MG: 40 INJECTION, SUSPENSION INTRA-ARTICULAR; INTRAMUSCULAR at 15:10

## 2025-05-07 NOTE — PROGRESS NOTES
"Chief Complaint  Initial Evaluation of the Left Knee and Initial Evaluation of the Right Knee    Subjective          Yolanda Sharp presents to [facility] for an evaluation of bilateral knee.     History of Present Illness    The patient presents here today for an evaluation of bilateral knee. Her knee's have been bothering her for several years but has gradually gotten worse. She had prior left knee arthroscopy in the past and states this was forty years ago. She presents today in an electric wheel chair. She has pain with prolonged walking. She denies any specific injury, trauma or falls.      Allergies   Allergen Reactions    Theophylline Other (See Comments) and Shortness Of Breath        Social History     Socioeconomic History    Marital status:    Tobacco Use    Smoking status: Former     Current packs/day: 0.00     Average packs/day: 1 pack/day for 33.0 years (33.0 ttl pk-yrs)     Types: Cigarettes     Start date:      Quit date:      Years since quittin.3     Passive exposure: Never    Smokeless tobacco: Never   Vaping Use    Vaping status: Never Used   Substance and Sexual Activity    Alcohol use: Never    Drug use: Never        I reviewed the patient's chief complaint, history of present illness, review of systems, past medical history, surgical history, family history, social history, medications, and allergy list.     REVIEW OF SYSTEMS    Constitutional: Denies fevers, chills, weight loss  Cardiovascular: Denies chest pain, shortness of breath  Skin: Denies rashes, acute skin changes  Neurologic: Denies headache, loss of consciousness  MSK: bilateral knee pain       Objective   Vital Signs:   /80   Pulse 77   Ht 154.9 cm (60.98\")   Wt 102 kg (225 lb)   SpO2 94%   BMI 42.54 kg/m²     Body mass index is 42.54 kg/m².    Physical Exam    General: Alert. No acute distress.   Right lower extremity: valgus deformity, tender to the medial joint line  " and lateral joint line. -5 degrees extension, flexion  to 110 degrees, crepitus with range of motion, tender to the medial joint line , smooth hip range of motion,  negative  Lachman's and posterior  drawer, positive  pulses, positive EHL, FHL, GS, and TA. Sensation intact to all 5 nerves of the foot.     Left lower extremity: -5 degrees extension, flexion  to 110 degrees, crepitus with range of motion, tender to the medial joint line , smooth hip range of motion,  tender to the medial joint line  and lateral joint line, negative  Lachman's and posterior  drawer, positive  pulses, positive EHL, FHL, GS, and TA. Sensation intact to all 5 nerves of the foot.     Large Joint: R knee  Date/Time: 5/7/2025 3:10 PM  Consent given by: patient  Site marked: site marked  Timeout: Immediately prior to procedure a time out was called to verify the correct patient, procedure, equipment, support staff and site/side marked as required   Supporting Documentation  Indications: pain   Procedure Details  Location: knee - R knee  Preparation: Patient was prepped and draped in the usual sterile fashion  Needle gauge: 21 G.  Medications administered: 5 mL lidocaine 1 %; 40 mg triamcinolone acetonide 40 MG/ML  Patient tolerance: patient tolerated the procedure well with no immediate complications      Large Joint: L knee  Date/Time: 5/7/2025 3:10 PM  Consent given by: patient  Site marked: site marked  Timeout: Immediately prior to procedure a time out was called to verify the correct patient, procedure, equipment, support staff and site/side marked as required   Supporting Documentation  Indications: pain   Procedure Details  Location: knee - L knee  Preparation: Patient was prepped and draped in the usual sterile fashion  Needle gauge: 21 G.  Medications administered: 5 mL lidocaine 1 %; 40 mg triamcinolone acetonide 40 MG/ML  Patient tolerance: patient tolerated the procedure well with no immediate complications      This injection  documentation was Scribed for Cory Bai MD by Polina Hayden MA.  05/07/25   15:11 EDT    Imaging Results (Most Recent)       Procedure Component Value Units Date/Time    XR Knee 4+ View Left [[id number]] Resulted: 05/07/25 1523     Updated: 05/07/25 1523    Narrative:      Indications: Left knee pain    Views: Weightbearing AP, PA flexion, lateral, sunrise left knee    Findings: Advanced tricompartmental degenerative changes.  No fractures.    Comparative Data: No comparative data available    XR Knee 4+ View Right [[id number]] Resulted: 05/07/25 1522     Updated: 05/07/25 1522    Narrative:      Indications: Right knee pain    Views: Weightbearing AP, PA flexion, lateral, sunrise right knee    Findings: Advanced tricompartmental degenerative changes.  Osteophyte   formation.  No fractures.    Comparative Data: No comparative data available                     Assessment and Plan        XR Knee 4+ View Left  Result Date: 5/7/2025  Narrative: Indications: Left knee pain Views: Weightbearing AP, PA flexion, lateral, sunrise left knee Findings: Advanced tricompartmental degenerative changes.  No fractures. Comparative Data: No comparative data available    XR Knee 4+ View Right  Result Date: 5/7/2025  Narrative: Indications: Right knee pain Views: Weightbearing AP, PA flexion, lateral, sunrise right knee Findings: Advanced tricompartmental degenerative changes.  Osteophyte formation.  No fractures. Comparative Data: No comparative data available    DEXA Bone Density Axial  Result Date: 4/29/2025  Narrative: DEXA BONE DENSITY AXIAL Date of Exam: 4/28/2025 1:57 PM EDT Indication: menopausal. Comparison: 7/1/2022 Findings: Lumbar Spine The BMD measured in the L1-L3 region is 0.906 g/cm2 with a T-score of -2.2. This patient is considered osteopenic according to World Health Organization (WHO) criteria. When compared to the previous examination dated 7/1/2022, the bone mineral density of the L1-L3 spine has apparently  decreased 18.2%. Femoral Neck The BMD measured at the right femoral neck is 0.651 g/cm2 with a T-score of -2.8. This patient is considered osteoporotic according to World Health Organization (WHO) criteria. When compared to the previous examination dated 7/1/2022, the bone mineral density of the femoral neck mean has apparently increased 6.6%. Total Hip The BMD of the total right hip is 0.645 g/cm2 with a T-score of -2.9. This patient is considered osteoporotic according to World Health Organization (WHO) criteria. FRAX Score: The estimated 10 year risk of a major osteoporotic fracture is calculated to be 24.9% and a hip fracture of 8.4%.     Impression: Impression: Osteoporosis. Report dictated by: Olamide Rees  I have personally reviewed this case and agree with the findings above: Electronically Signed: Nigel Bailey MD  4/29/2025 2:50 PM EDT  Workstation ID: [id number]    Mammo Screening Digital Tomosynthesis Bilateral With CAD  Result Date: 4/28/2025  Narrative: MAMMO SCREENING DIGITAL TOMOSYNTHESIS BILATERAL W CAD-  Date of Exam: 4/28/2025 1:57 PM  Indication: Screening. A sister with history of breast cancer.  Comparison: July 1, 2022  Technique: Routine screening mammogram images were obtained per protocol.  Tomosynthesis was utilized.  These mammographic images were interpreted with the assistance of a computer aided detection system.  Breast Density: The breasts are almost entirely fatty.  Findings: No suspicious masses, suspicious microcalcifications, or architectural distortions are identified. Left chest pacemaker/defibrillator is incompletely imaged.      Impression: 1.  No mammographic evidence of malignancy.  Recommend annual screening mammography. 2.  Breast arterial calcifications which have been associated with cardiovascular disease.  BI-RADS ASSESSMENT: Category 2: Benign  Note: It has been reported that there is approximately a 15% false negative rate in mammography.  Therefore, management of a  palpable abnormality should not be deferred because of a negative mammogram.  4/28/2025 4:03 PM by Nigel Roberts on Workstation: [id number]         Diagnoses and all orders for this visit:    1. Right knee pain, unspecified chronicity (Primary)  -     XR Knee 4+ View Right    2. Left knee pain, unspecified chronicity  -     XR Knee 4+ View Left    3. Primary osteoarthritis of knees, bilateral    4. Obesity, morbid, BMI 40.0-49.9    Other orders  -     Large Joint: R knee  -     Large Joint: L knee      The patient presents here today for an evaluation  of bilateral knee. X-rays were obtained in the office today and these were reviewed today.     Discussed the risks and benefits of bilateral knee steroid injections today in the office. Patient expressed understanding and wishes to proceed. Patient tolerted the injection well and without any complications.     Discussed physical therapy however patient declined this at this time.     Will seek an approval for bilateral knee gel injections in the future.     Educated on risk of smoking/nicotine. Discussed options for smoking cessation regarding chantix, nicorette gum and/ or to call the quit hotline at [phone]  and Call or return if worsening symptoms.    Scribed for Cory Bai MD by Ruth Ferrer  05/07/2025   14:11 EDT         Follow Up       After gel injection approval.     Patient was given instructions and counseling regarding her condition or for health maintenance advice. Please see specific information pulled into the AVS if appropriate.       I have personally performed the services described in this document as scribed by the above individual and it is both accurate and complete. Cory Bai MD 05/07/25 15:41 EDT

## 2025-05-14 DIAGNOSIS — R73.9 ELEVATED BLOOD SUGAR: Primary | ICD-10-CM

## 2025-05-15 ENCOUNTER — TELEPHONE (OUTPATIENT)
Dept: ORTHOPEDIC SURGERY | Facility: CLINIC | Age: 85
End: 2025-05-15
Payer: MEDICARE

## 2025-05-15 NOTE — TELEPHONE ENCOUNTER
APPT: TIARRA KNEE EUFLEXXA INJ WITH SOLOMON  7-9 AT 11:00 AM  7-16 AT 11:00 AM  7-23 AT 11:00 AM    LAST TIARRA KNEE STEROID INJ:  5-7    ANTHEM MC=AUDREY EXP: 11-14-25

## 2025-05-15 NOTE — TELEPHONE ENCOUNTER
----- Message from Polina PATEL sent at 5/14/2025 10:02 AM EDT -----  EUFLEXXA/ TIARRA KNEE/ ABEL APPROVAL # [id number] dos 6-18-25-11-14-25.  OK TO SCHEDULE 6-18-25 OR AFTER.

## 2025-05-20 ENCOUNTER — TELEPHONE (OUTPATIENT)
Dept: FAMILY MEDICINE CLINIC | Age: 85
End: 2025-05-20
Payer: MEDICARE

## 2025-05-20 NOTE — TELEPHONE ENCOUNTER
Initial Prolia    Last Dexa -2.9  DEXA Bone Density Axial (04/28/2025 14:07)     Last CMP- Calcium 9.7  Comprehensive metabolic panel (05/07/2025 17:34)     Last office Visit   Office Visit with Kathleen Brown MD (04/28/2025)     Benefits verification submitted.

## 2025-05-21 ENCOUNTER — TELEPHONE (OUTPATIENT)
Dept: ORTHOPEDIC SURGERY | Facility: CLINIC | Age: 85
End: 2025-05-21

## 2025-05-21 NOTE — TELEPHONE ENCOUNTER
Hub staff attempted to follow warm transfer process and was unsuccessful     Caller: Yolanda Sharp    Relationship to patient: Self    Best call back number: [phone]    Patient is needing: PATIENT STATES SOMEONE FROM OFFICE CALLED HER YESTERDAY AND SHE COULDN'T UNDERSTAND WHAT THE CALL WAS FOR

## 2025-06-02 ENCOUNTER — TELEPHONE (OUTPATIENT)
Dept: FAMILY MEDICINE CLINIC | Age: 85
End: 2025-06-02

## 2025-06-02 NOTE — TELEPHONE ENCOUNTER
I spoke with pt and she said that she feels terrible she does not even feel like leaving the apartment audible wheezing on the phone noted and c/o soa I offered her an 11 am appt but she said she is to sick she is going to go to the er

## 2025-06-02 NOTE — TELEPHONE ENCOUNTER
Caller: Yolanda Sharp    Relationship: Self    Best call back number: [phone]     What medication are you requesting: WHATEVER DR OVALLE RECOMMENDS     What are your current symptoms: COUGH, EXTREME FATIGUE, SHARP PAIN IN FROM COUGHING    How long have you been experiencing symptoms: 2 DAYS    Have you had these symptoms before:    [] Yes  [x] No    Have you been treated for these symptoms before:   [] Yes  [x] No    If a prescription is needed, what is your preferred pharmacy and phone number: [facility] [address] [phone] [facility] [phone]      Additional notes:    PATIENT STATE SHE HAS BEEN TAKING GUAIFENESIN 400 MG AND CHLORPHENIRAMINE-MALEATE.

## 2025-06-02 NOTE — TELEPHONE ENCOUNTER
Thank you for the update.  Yes ER referral is appropriate if she is not willing to come to the office.  Dr. Brown

## 2025-06-04 ENCOUNTER — OFFICE VISIT (OUTPATIENT)
Dept: FAMILY MEDICINE CLINIC | Age: 85
End: 2025-06-04
Payer: MEDICARE

## 2025-06-04 VITALS
TEMPERATURE: 99 F | OXYGEN SATURATION: 95 % | HEART RATE: 73 BPM | BODY MASS INDEX: 42.51 KG/M2 | HEIGHT: 61 IN | SYSTOLIC BLOOD PRESSURE: 109 MMHG | DIASTOLIC BLOOD PRESSURE: 72 MMHG

## 2025-06-04 DIAGNOSIS — R05.1 ACUTE COUGH: Primary | ICD-10-CM

## 2025-06-04 DIAGNOSIS — E87.1 HYPONATREMIA: ICD-10-CM

## 2025-06-04 DIAGNOSIS — I10 PRIMARY HYPERTENSION: ICD-10-CM

## 2025-06-04 RX ORDER — CEPHALEXIN 500 MG/1
500 CAPSULE ORAL 3 TIMES DAILY
Qty: 30 CAPSULE | Refills: 0 | Status: SHIPPED | OUTPATIENT
Start: 2025-06-04

## 2025-06-04 RX ORDER — BENZONATATE 200 MG/1
200 CAPSULE ORAL 3 TIMES DAILY PRN
Qty: 30 CAPSULE | Refills: 1 | Status: SHIPPED | OUTPATIENT
Start: 2025-06-04

## 2025-06-04 RX ORDER — GUAIFENESIN 600 MG/1
1200 TABLET, EXTENDED RELEASE ORAL
COMMUNITY
Start: 2025-06-02 | End: 2025-06-05

## 2025-06-04 NOTE — PROGRESS NOTES
Chief Complaint  Hospital Follow Up Visit (Patient went to [facility] ER on 6-2-25 for viral URI with cough, hyponatremia)    Subjective          Yolanda Sharp presents to [facility]  History of Present Illness  --TIGHT COUGH, FEVER AND WEAKNESS FOR THE LAST WEEK.  NEGATIVE EVAL IN THE ER EXCEPT FOR A SODIUM .  NO ANTIBIOTICS GIVEN AT THE TIME.  NOW SHE FEELS NO BETTER.  STILL HAS SOME FEVER.  BP IS S/W LOW TODAY AS WELL.  HAS USED AN ALBUTEROL INHALER FROM TIME TO TIME IN THE PAST BUT NOT SINCE SHE STARTED FEELING BAD.         Allergies   Allergen Reactions    Theophylline Other (See Comments) and Shortness Of Breath        There are no preventive care reminders to display for this patient.     Current Outpatient Medications on File Prior to Visit   Medication Sig    albuterol (ACCUNEB) 1.25 MG/3ML nebulizer solution Take 3 mL by nebulization Every 6 (Six) Hours As Needed for Shortness of Air.    aluminum-magnesium hydroxide 200-200 MG/5ML suspension, diphenhydrAMINE 12.5 MG/5ML liquid, Lidocaine Viscous HCl 2 % solution, nystatin 100,000 unit/mL suspension, hydrocortisone 10 MG tablet 20 mg Swish and spit 5 mL 4 (Four) Times a Day.    Calcium-Vitamin D-Vitamin K 500-100-40 MG-UNT-MCG chewable tablet Calcium for Women 500-100-40 mg-unit-mcg oral tablet,chewable chew 2 tablets by oral route daily   Active    carbidopa-levodopa (SINEMET)  MG per tablet TAKE 1 TABLET BY MOUTH EVERY NIGHT AT BEDTIME    diphenhydrAMINE (BENADRYL) 25 mg capsule Take 1 capsule by mouth Every 6 (Six) Hours As Needed for Itching.    Eliquis 5 MG tablet tablet Take 1 tablet by mouth Every 12 (Twelve) Hours.    famotidine (PEPCID) 40 MG tablet TAKE 1 TABLET BY MOUTH TWICE DAILY    FLUoxetine (PROzac) 20 MG capsule Take 1 capsule by mouth Daily.    guaiFENesin (MUCINEX) 600 MG 12 hr tablet Take 2 tablets by mouth.    meclizine (ANTIVERT) 12.5 MG tablet Take 1 tablet by mouth 3 (Three) Times  a Day As Needed for Dizziness.    melatonin 3 MG tablet melatonin 3 mg oral tablet take 1 tablet by oral route daily   Active    methocarbamol (ROBAXIN) 500 MG tablet TAKE 1 TABLET BY MOUTH THREE TIMES DAILY AS NEEDED FOR MUSCLE SPASMS    multivitamin with minerals tablet tablet multivitamin oral capsule take 1 capsule by oral route daily   Active    ondansetron (Zofran) 4 MG tablet Take 1 tablet by mouth Every 8 (Eight) Hours As Needed for Nausea or Vomiting.    spironolactone (ALDACTONE) 25 MG tablet Take 2 tablets by mouth Daily.    [DISCONTINUED] fexofenadine (Allegra Allergy) 180 MG tablet Take 1 tablet by mouth Daily.    docusate sodium 100 MG capsule Take 1 capsule by mouth. (Patient not taking: Reported on 6/4/2025)    [DISCONTINUED] azithromycin (Zithromax Z-Eduard) 250 MG tablet Take 2 tablets by mouth on day 1, then 1 tablet daily on days 2-5     Current Facility-Administered Medications on File Prior to Visit   Medication    denosumab (PROLIA) syringe 60 mg       Immunization History   Administered Date(s) Administered    COVID-19 (PFIZER) 12YRS+ (COMIRNATY) 04/17/2024, 10/28/2024    COVID-19 (PFIZER) BIVALENT 12+YRS 10/11/2022    COVID-19 (PFIZER) Purple Cap Monovalent 01/30/2021, 02/21/2021, 10/08/2021    Fluzone (or Fluarix & Flulaval for VFC) >6mos 12/03/2021    Fluzone High-Dose 65+YRS 10/28/2024    Fluzone High-Dose 65+yrs 10/11/2022, 10/18/2023       Review of Systems   Constitutional:  Positive for fatigue. Negative for activity change, appetite change, chills and fever.   HENT:  Negative for congestion, ear pain, rhinorrhea and sore throat.    Respiratory:  Positive for cough. Negative for shortness of breath.    Cardiovascular:  Negative for chest pain, palpitations and leg swelling.   Gastrointestinal:  Negative for abdominal pain, constipation, nausea and vomiting.   Musculoskeletal:  Negative for arthralgias and myalgias.   Neurological:  Negative for headache.        Objective     /72  "(BP Location: Left arm, Patient Position: Sitting)   Pulse 73   Temp 99 °F (37.2 °C) (Oral)   Ht 154.9 cm (61\")   SpO2 95% Comment: on RA  BMI 42.51 kg/m²       Physical Exam  Vitals and nursing note reviewed.   Constitutional:       General: She is not in acute distress.     Appearance: Normal appearance.   Cardiovascular:      Rate and Rhythm: Normal rate and regular rhythm.      Heart sounds: Normal heart sounds. No murmur heard.  Pulmonary:      Effort: Pulmonary effort is normal.      Breath sounds: Normal breath sounds.   Abdominal:      Palpations: Abdomen is soft.      Tenderness: There is no abdominal tenderness.   Musculoskeletal:      Cervical back: Neck supple.      Right lower leg: No edema.      Left lower leg: No edema.   Lymphadenopathy:      Cervical: No cervical adenopathy.   Neurological:      General: No focal deficit present.      Mental Status: She is alert.      Cranial Nerves: No cranial nerve deficit.      Coordination: Coordination normal.      Gait: Gait normal.   Psychiatric:         Mood and Affect: Mood normal.         Behavior: Behavior normal.         Result Review :                             Assessment and Plan      Diagnoses and all orders for this visit:    1. Acute cough (Primary)  Comments:  LAB AND IMAGING REPORTS REVIEWED.  WILL COVER AS NOTED, FLUIDS--REST, OTC MEDS PRN.  ADVISED TO USE THE ALBUTEROL ROUTINELY FOR A FEW DAYS.  Orders:  -     cephalexin (KEFLEX) 500 MG capsule; Take 1 capsule by mouth 3 (Three) Times a Day.  Dispense: 30 capsule; Refill: 0  -     benzonatate (TESSALON) 200 MG capsule; Take 1 capsule by mouth 3 (Three) Times a Day As Needed for Cough.  Dispense: 30 capsule; Refill: 1    2. Hyponatremia  Comments:  NONSPECIFIC, WILL REPEAT A BMP NEXT WEEK.  Orders:  -     Basic Metabolic Panel; Future    3. Primary hypertension  Assessment & Plan:  BP LOWER POSSIBLY RELATED TO HER ACUTE ILLNESS.  WILL MONITOR FOR NOW                       Follow Up     No " follow-ups on file.    Patient was given instructions and counseling regarding her condition or for health maintenance advice. Please see specific information pulled into the AVS if appropriate.

## 2025-06-09 ENCOUNTER — TELEPHONE (OUTPATIENT)
Dept: FAMILY MEDICINE CLINIC | Age: 85
End: 2025-06-09
Payer: MEDICARE

## 2025-06-09 NOTE — TELEPHONE ENCOUNTER
----- Message from Gerson Polk sent at 6/4/2025  5:23 PM EDT -----  PLEASE CALL THIS PATIENT ON MONDAY (6/9) TO SEE HOW HER COUGH IS DOING.  ALSO ASK IF THEY ARE CHECKING HER BP AT HOME SINCE IT WAS A LITTLE LOW HERE AT THE OFFICE TODAY.  IN ADDITION I HAVE PLACED AN ORDER FOR HER TO HAVE LABS DRAWN LATER IN THE WEEK TO RECHECK HER SODIUM LEVEL.  THANK YOU

## 2025-06-12 ENCOUNTER — TELEPHONE (OUTPATIENT)
Dept: FAMILY MEDICINE CLINIC | Age: 85
End: 2025-06-12
Payer: MEDICARE

## 2025-06-12 NOTE — TELEPHONE ENCOUNTER
Gudelia said her mothers phone had been out, it is back working today.  She will tell her to call us.

## 2025-06-12 NOTE — TELEPHONE ENCOUNTER
Patient advised    Pt called back and said her cough is better, not fully gone but better.  She does not check her b/p at home and does not have a b/p cuff.  Told her she can stop by our office and get it checked free of charge. She will try to get her lab done this week.

## 2025-06-23 ENCOUNTER — TELEPHONE (OUTPATIENT)
Dept: FAMILY MEDICINE CLINIC | Age: 85
End: 2025-06-23
Payer: MEDICARE

## 2025-06-23 ENCOUNTER — LAB (OUTPATIENT)
Dept: LAB | Facility: HOSPITAL | Age: 85
End: 2025-06-23
Payer: MEDICARE

## 2025-06-23 DIAGNOSIS — R73.9 ELEVATED BLOOD SUGAR: ICD-10-CM

## 2025-06-23 DIAGNOSIS — E87.1 HYPONATREMIA: ICD-10-CM

## 2025-06-23 LAB
ANION GAP SERPL CALCULATED.3IONS-SCNC: 11.5 MMOL/L (ref 5–15)
BUN SERPL-MCNC: 9 MG/DL (ref 8–23)
BUN/CREAT SERPL: 11.5 (ref 7–25)
CALCIUM SPEC-SCNC: 9.8 MG/DL (ref 8.6–10.5)
CHLORIDE SERPL-SCNC: 101 MMOL/L (ref 98–107)
CO2 SERPL-SCNC: 25.5 MMOL/L (ref 22–29)
CREAT SERPL-MCNC: 0.78 MG/DL (ref 0.57–1)
EGFRCR SERPLBLD CKD-EPI 2021: 74.1 ML/MIN/1.73
GLUCOSE SERPL-MCNC: 88 MG/DL (ref 65–99)
HBA1C MFR BLD: 5.4 % (ref 4.8–5.6)
POTASSIUM SERPL-SCNC: 4.3 MMOL/L (ref 3.5–5.2)
SODIUM SERPL-SCNC: 138 MMOL/L (ref 136–145)

## 2025-06-23 PROCEDURE — 36415 COLL VENOUS BLD VENIPUNCTURE: CPT

## 2025-06-23 PROCEDURE — 80048 BASIC METABOLIC PNL TOTAL CA: CPT

## 2025-06-23 PROCEDURE — 83036 HEMOGLOBIN GLYCOSYLATED A1C: CPT

## 2025-06-23 NOTE — TELEPHONE ENCOUNTER
Caller: [facility] - [place], [place] - [address] - [phone]  - [phone]    Relationship to patient: Pharmacy    Best call back number: [phone]     Patient is needing: LARRY FROM [facility] CALLED IN LETTING OFFICE KNOW THAT THE PATIENTS PROLIA SHOT WILL BE DELIVERED 6.26.25.

## 2025-07-09 ENCOUNTER — OFFICE VISIT (OUTPATIENT)
Dept: ORTHOPEDIC SURGERY | Facility: CLINIC | Age: 85
End: 2025-07-09
Payer: MEDICARE

## 2025-07-09 VITALS — DIASTOLIC BLOOD PRESSURE: 78 MMHG | OXYGEN SATURATION: 98 % | SYSTOLIC BLOOD PRESSURE: 121 MMHG | HEART RATE: 72 BPM

## 2025-07-09 DIAGNOSIS — M17.0 PRIMARY OSTEOARTHRITIS OF KNEES, BILATERAL: Primary | ICD-10-CM

## 2025-07-09 NOTE — PROGRESS NOTES
Chief Complaint    Follow-up of the Right Knee and Follow-up of the Left Knee    Subjective              History of Present Illness  The patient is an 84-year-old female who presents today for a follow-up on her bilateral knee arthritis. She has been treated nonoperatively and was last seen in the office on 2025, where she received a steroid injection. She also received preauthorization for a Gel injection at that visit.    She reports that the steroid injection provided relief until she began to exert herself. The severity of her pain varies depending on her activities, particularly when she needs to stand or walk. She relies on a wheelchair for mobility and experiences similar levels of pain in both knees. She has not previously received Euflexxa injections. She continues to drive and uses a walker for support. She has been experiencing knee pain for some time, which she attributes to a fall that required surgery on her left knee. She believes she may have overcompensated with her right knee during her recovery. X-rays have shown that both knees lack cushioning. Previous physical therapy has provided temporary relief, but the pain returns when she attempts to walk. She also notes swelling in her legs and ankles.    Additionally, she experiences severe back pain when standing, which she finds more debilitating than her knee pain. She has previously received injections for her back pain.      Allergies   Allergen Reactions    Theophylline Other (See Comments) and Shortness Of Breath        Social History     Socioeconomic History    Marital status:    Tobacco Use    Smoking status: Former     Current packs/day: 0.00     Average packs/day: 1 pack/day for 33.0 years (33.0 ttl pk-yrs)     Types: Cigarettes     Start date:      Quit date:      Years since quittin.5     Passive exposure: Never    Smokeless tobacco: Never   Vaping Use    Vaping status: Never Used   Substance and Sexual Activity     Alcohol use: Never    Drug use: Never        I reviewed the patient's chief complaint, history of present illness, review of systems, past medical history, surgical history, family history, social history, medications, and allergy list.     REVIEW OF SYSTEMS    14 point review of systems negative except as noted above in the HPI      Objective     Vital Signs:   /78   Pulse 72   SpO2 98%     There is no height or weight on file to calculate BMI.    Physical Exam    General: Well developed, well nourished. Alert. No acute distress.    Bilateral knee : Skin is intact, dry. Some swelling on both knee. No pain with passive hip ROM. She has medial or lateral joint line tenderness on both knee. Calf soft, nontender. Full active knee extension bilaterally. 110 degrees flexion with pain. Intact active ankle dorsiflexion and plantarflexion. Noted patella femoral crepitus with motion. Intact sensation over dorsal and plantar foot. Muscle strength 4/5 bilaterally.           Large Joint Arthrocentesis  Date/Time: 7/9/2025 10:59 AM  Consent given by: patient  Site marked: site marked  Timeout: Immediately prior to procedure a time out was called to verify the correct patient, procedure, equipment, support staff and site/side marked as required   Supporting Documentation  Indications: pain   Procedure Details  Location: -   Location: right knee.Needle gauge: 21 G.  Medications administered: 20 mg Sodium Hyaluronate (Viscosup) 20 MG/2ML  Patient tolerance: patient tolerated the procedure well with no immediate complications      Large Joint Arthrocentesis  Date/Time: 7/9/2025 10:59 AM  Consent given by: patient  Site marked: site marked  Timeout: Immediately prior to procedure a time out was called to verify the correct patient, procedure, equipment, support staff and site/side marked as required   Supporting Documentation  Indications: pain   Procedure Details  Location: -   Location: left knee.Needle gauge: 21  G.  Medications administered: 20 mg Sodium Hyaluronate (Viscosup) 20 MG/2ML  Patient tolerance: patient tolerated the procedure well with no immediate complications      This injection documentation was Scribed for CHEVY Mccann by Juju Landis MA.  07/09/25   11:00 EDT    Imaging Results (Most Recent)       None                   Assessment and Plan      Diagnoses and all orders for this visit:    1. Primary osteoarthritis of knees, bilateral (Primary)    Other orders  -     Large Joint Arthrocentesis  -     Large Joint Arthrocentesis        Assessment & Plan  Bilateral knee arthritis:    Reports that the steroid injection provided some relief but the pain returns with activity. Experiences pain while walking and standing, and uses a wheelchair for mobility. Examination reveals significant swelling in both knees and ankles. 1st Euflexxa injections were administered in both knees today.  Patient states that she has no allergy and tolerated procedure well.  Advised that the first injection might not provide immediate relief and that it may take up to the third injection to notice significant improvement. Informed about potential side effects such as redness and swelling, which can be managed with ice application and over-the-counter analgesics like Tylenol or ibuprofen. If severe symptoms occur, contact the office immediately. Advised to elevate legs and apply ice at home.      Follow-up: A follow-up appointment is scheduled for 1 week for second Euflexxa injection    PROCEDURE  Euflexxa injections were administered in both knees today.          Follow Up     Return in about 1 week (around 7/16/2025).    Patient was given instructions and counseling regarding her condition or for health maintenance advice. Please see specific information pulled into the AVS if appropriate.     Patient or patient representative verbalized consent for the use of Ambient Listening during the visit with CHEVY Mccann  "chart documentation. 7/9/2025 11:30 EDT     Electronically signed by Juju Landis MA, 07/09/25, 10:59 AM EDT.    \" Dictated utilizing Dragon dictation: Part of this note may be electronic transcription/translation of spoken language to printed text by using the Dragon dictation system.\"  "

## 2025-07-10 DIAGNOSIS — G25.81 RESTLESS LEGS SYNDROME (RLS): ICD-10-CM

## 2025-07-10 RX ORDER — CARBIDOPA AND LEVODOPA 25; 100 MG/1; MG/1
1 TABLET ORAL
Qty: 90 TABLET | Refills: 2 | Status: SHIPPED | OUTPATIENT
Start: 2025-07-10

## 2025-07-16 ENCOUNTER — OFFICE VISIT (OUTPATIENT)
Dept: ORTHOPEDIC SURGERY | Facility: CLINIC | Age: 85
End: 2025-07-16
Payer: MEDICARE

## 2025-07-16 VITALS — OXYGEN SATURATION: 93 % | HEART RATE: 77 BPM | SYSTOLIC BLOOD PRESSURE: 120 MMHG | DIASTOLIC BLOOD PRESSURE: 81 MMHG

## 2025-07-16 DIAGNOSIS — E66.01 OBESITY, MORBID, BMI 40.0-49.9: ICD-10-CM

## 2025-07-16 DIAGNOSIS — M17.0 PRIMARY OSTEOARTHRITIS OF KNEES, BILATERAL: Primary | ICD-10-CM

## 2025-07-16 NOTE — PROGRESS NOTES
Chief Complaint  Follow-up of the Right Knee and Follow-up of the Left Knee    Subjective          History of Present Illness      History of Present Illness  The patient is an 84-year-old female who presents for a follow-up on bilateral knee arthritis. She had her first Euflexxa injection 1 week ago and is here for her second injection.    She reports that the initial Euflexxa injection provided some relief, allowing her to walk without significant discomfort. However, she experienced back pain during this period. She mentions that her left knee, which was previously less problematic than the right, has been causing her more discomfort recently. She uses a wheelchair for mobility.     She has received approximately 6 injections in her spine, spaced out over several months, which provided temporary relief until she engaged in activities such as house cleaning. She discontinued these injections due to concerns about potential worsening of her back condition.    She had a biopsy done on her forehead and head.    FAMILY HISTORY  - Son: Passed away due to cancer  for other first-degree relatives      Allergies   Allergen Reactions    Theophylline Other (See Comments) and Shortness Of Breath        Social History     Socioeconomic History    Marital status:    Tobacco Use    Smoking status: Former     Current packs/day: 0.00     Average packs/day: 1 pack/day for 33.0 years (33.0 ttl pk-yrs)     Types: Cigarettes     Start date:      Quit date:      Years since quittin.5     Passive exposure: Never    Smokeless tobacco: Never   Vaping Use    Vaping status: Never Used   Substance and Sexual Activity    Alcohol use: Never    Drug use: Never        I reviewed the patient's chief complaint, history of present illness, review of systems, past medical history, surgical history, family history, social history, medications, and allergy list.     REVIEW OF SYSTEMS    14 point review of systems negative except  as noted above in the HPI    Objective     Vital Signs:     /81   Pulse 77   SpO2 93%     There is no height or weight on file to calculate BMI.    Physical Exam    Examination of bilateral knee, skin is intact and dry.  Mild swelling on both knee.  Hip range of motion is normal.  She continues have medial and lateral joint line tenderness on both knee.  She is able to do full knee extension as well as 110 degree flexion with discomfort.  Ankle motion is normal.  Sensations intact to light touch.  Motor exam is normal.      Large Joint Arthrocentesis  Date/Time: 7/16/2025 11:05 AM  Consent given by: patient  Site marked: site marked  Timeout: Immediately prior to procedure a time out was called to verify the correct patient, procedure, equipment, support staff and site/side marked as required   Supporting Documentation  Indications: pain   Procedure Details  Location: -   Location: right knee.Needle gauge: 21 G.  Medications administered: 20 mg Sodium Hyaluronate (Viscosup) 20 MG/2ML  Patient tolerance: patient tolerated the procedure well with no immediate complications      Large Joint Arthrocentesis  Date/Time: 7/16/2025 11:05 AM  Consent given by: patient  Site marked: site marked  Timeout: Immediately prior to procedure a time out was called to verify the correct patient, procedure, equipment, support staff and site/side marked as required   Supporting Documentation  Indications: pain   Procedure Details  Location: -   Location: left knee.Needle gauge: 21 G.  Medications administered: 20 mg Sodium Hyaluronate (Viscosup) 20 MG/2ML  Patient tolerance: patient tolerated the procedure well with no immediate complications      This injection documentation was Scribed for CHEVY Mccann by Juju Landis MA.  07/16/25   11:05 EDT    Imaging Results (Most Recent)       None                   Assessment and Plan      Diagnoses and all orders for this visit:    1. Primary osteoarthritis of knees,  "bilateral (Primary)    2. Obesity, morbid, BMI 40.0-49.9    Other orders  -     Large Joint Arthrocentesis  -     Large Joint Arthrocentesis        Ms. Yolanda Sharp is a 84 y.o. female patient presents at office today for follow up of bilateral knee arthritis that we has been treating nonoperatively,    Assessment & Plan  1. Bilateral knee arthritis:  The first Euflexxa injection provided some relief, although back pain exacerbated knee discomfort. The second Euflexxa injection was administered today. Discussed the risk and benefit of with the patient regarding gel injection.  Patient denied any medication allergy and elected to proceed. Time out was performed, patient has confirmed her name, date of birth as well as location of injection prior to the procedure. Patient tolerated injection well without complications. Recommended the patient to continue home exercises.    Consultation with her back specialist is advised to discuss the continuation of spinal injections, which may help alleviate knee pain. She should avoid falls and continue using her wheelchair for mobility support.    Overall, patient is doing good and tolerated the injection.  The patient will follow up in 1 week.    Follow-up: A follow-up appointment is scheduled for 07/23/2025.    PROCEDURE  Euflexxa injection administered in both knees today.      Advised the patient to call or return if symptoms worsen or patient has any concerns.       Follow Up     Return in about 1 week (around 7/23/2025).    Patient was given instructions and counseling regarding her condition or for health maintenance advice. Please see specific information pulled into the AVS if appropriate.     Patient or patient representative verbalized consent for the use of Ambient Listening during the visit with CHEVY Mccann chart documentation. 7/16/2025 12:25 EDT     \" Dictated utilizing Dragon dictation: Part of this note may be electronic transcription/translation of " "spoken language to printed text by using the Dragon dictation system.\"    Electronically signed by Juju Landis MA, 07/16/25, 11:05 AM EDT.      "

## 2025-07-23 ENCOUNTER — OFFICE VISIT (OUTPATIENT)
Dept: ORTHOPEDIC SURGERY | Facility: CLINIC | Age: 85
End: 2025-07-23
Payer: MEDICARE

## 2025-07-23 DIAGNOSIS — E66.01 OBESITY, MORBID, BMI 40.0-49.9: ICD-10-CM

## 2025-07-23 DIAGNOSIS — M17.0 PRIMARY OSTEOARTHRITIS OF KNEES, BILATERAL: Primary | ICD-10-CM

## 2025-07-23 NOTE — PROGRESS NOTES
Chief Complaint  Follow-up of the Right Knee and Follow-up of the Left Knee    Subjective          History of Present Illness      History of Present Illness  The patient is an 84-year-old female who is here today for follow-up on her bilateral knee arthritis. She is here today for her third Euflexxa injection.    She reports that her knee condition remains unchanged. The second injection provided some relief until the past few days. She has been able to walk with the aid of a walker. She also mentions experiencing back pain when she is active. She plans to schedule an appointment with her primary care physician for this issue.    Supplemental Information  She had a headache this morning and thought she was having spells related to her pacemaker.    SOCIAL HISTORY  Occupations: Supervisor at a restaurant  Exercise: Walks with a walker  Coffee/Tea/Caffeine-containing Drinks: Drinks coffee      Allergies   Allergen Reactions    Theophylline Other (See Comments) and Shortness Of Breath        Social History     Socioeconomic History    Marital status:    Tobacco Use    Smoking status: Former     Current packs/day: 0.00     Average packs/day: 1 pack/day for 33.0 years (33.0 ttl pk-yrs)     Types: Cigarettes     Start date:      Quit date:      Years since quittin.5     Passive exposure: Never    Smokeless tobacco: Never   Vaping Use    Vaping status: Never Used   Substance and Sexual Activity    Alcohol use: Never    Drug use: Never        I reviewed the patient's chief complaint, history of present illness, review of systems, past medical history, surgical history, family history, social history, medications, and allergy list.     REVIEW OF SYSTEMS    14 point review of systems negative except as noted above in the HPI    Objective     Vital Signs:     There were no vitals taken for this visit.    There is no height or weight on file to calculate BMI.    Physical Exam    Examination of bilateral  knee shows skin is intact dry.  Mild swelling on both knee.  He range of motion is normal.  She continue having medial and lateral joint line tenderness bilaterally.  She has sensations intact, her neurovascular intact.  She has full knee extension with some discomfort as well as 110 degree flexion with discomfort.  Her calf is soft and nontender.      Large Joint Arthrocentesis  Date/Time: 7/23/2025 11:03 AM  Consent given by: patient  Site marked: site marked  Timeout: Immediately prior to procedure a time out was called to verify the correct patient, procedure, equipment, support staff and site/side marked as required   Supporting Documentation  Indications: pain   Procedure Details  Location: -   Location: right knee.Needle gauge: 21 G.  Medications administered: 20 mg Sodium Hyaluronate (Viscosup) 20 MG/2ML  Patient tolerance: patient tolerated the procedure well with no immediate complications      Large Joint Arthrocentesis  Date/Time: 7/23/2025 11:03 AM  Consent given by: patient  Site marked: site marked  Timeout: Immediately prior to procedure a time out was called to verify the correct patient, procedure, equipment, support staff and site/side marked as required   Supporting Documentation  Indications: pain   Procedure Details  Location: -   Location: left knee.Needle gauge: 21 G.  Medications administered: 20 mg Sodium Hyaluronate (Viscosup) 20 MG/2ML  Patient tolerance: patient tolerated the procedure well with no immediate complications      This injection documentation was Scribed for CHEVY Mccann by Juju Landis MA.  07/23/25   11:04 EDT    IMAGING    No results found.            Assessment and Plan      Diagnoses and all orders for this visit:    1. Primary osteoarthritis of knees, bilateral (Primary)    2. Obesity, morbid, BMI 40.0-49.9    Other orders  -     Large Joint Arthrocentesis  -     Large Joint Arthrocentesis        Ms. Yolanda Sharp is a 84 y.o. female patient presents at  "office today for follow up of bilateral knee arthritis that has been treated conservatively,    Assessment & Plan  1. Bilateral knee arthritis:  The second Euflexxa injection provided some relief compared to the first one, but the effect has diminished over the last couple of days. She has been using a walker for mobility. The third Euflexxa injection was administered today. Discussed the risk and benefit of with the patient regarding gel injection.  Patient denied any medication allergy and elected to proceed. Time out was performed, patient has confirmed her name, date of birth as well as location of injection prior to the procedure. Patient tolerated injection well without complications. Recommended the patient to continue home exercises.     Patient prefers follow up at [facility]. She is advised to follow up in 3 months with Yady DAVENPORT.     Follow-up: The patient will follow up in 3 months.    PROCEDURE  Effexor third injection was administered today for bilateral knee arthritis.      Advised the patient to call or return if symptoms worsen or patient has any concerns.       Follow Up     Return in about 3 months (around 10/23/2025).    Patient was given instructions and counseling regarding her condition or for health maintenance advice. Please see specific information pulled into the AVS if appropriate.     Patient or patient representative verbalized consent for the use of Ambient Listening during the visit with CHEVY Mccann chart documentation. 7/23/2025 17:37 EDT     \" Dictated utilizing Dragon dictation: Part of this note may be electronic transcription/translation of spoken language to printed text by using the Dragon dictation system.\"    Electronically signed by Juju Landis MA, 07/23/25, 11:03 AM EDT.    "

## 2025-08-14 ENCOUNTER — TELEPHONE (OUTPATIENT)
Dept: FAMILY MEDICINE CLINIC | Age: 85
End: 2025-08-14
Payer: MEDICARE

## 2025-08-26 ENCOUNTER — TELEPHONE (OUTPATIENT)
Age: 85
End: 2025-08-26
Payer: MEDICARE